# Patient Record
Sex: MALE | Employment: FULL TIME | URBAN - METROPOLITAN AREA
[De-identification: names, ages, dates, MRNs, and addresses within clinical notes are randomized per-mention and may not be internally consistent; named-entity substitution may affect disease eponyms.]

---

## 2024-06-09 ENCOUNTER — HOSPITAL ENCOUNTER (OUTPATIENT)
Dept: RADIOLOGY | Facility: HOSPITAL | Age: 44
Discharge: HOME | End: 2024-06-09
Payer: OTHER MISCELLANEOUS

## 2024-06-09 DIAGNOSIS — S56.511A PARTIAL TEAR OF COMMON EXTENSOR TENDON OF RIGHT ELBOW: ICD-10-CM

## 2024-06-09 PROCEDURE — 73221 MRI JOINT UPR EXTREM W/O DYE: CPT

## 2024-06-11 ENCOUNTER — OFFICE VISIT (OUTPATIENT)
Dept: PHYSICAL THERAPY | Facility: CLINIC | Age: 44
End: 2024-06-11
Payer: OTHER MISCELLANEOUS

## 2024-06-11 DIAGNOSIS — M77.11 RIGHT LATERAL EPICONDYLITIS: ICD-10-CM

## 2024-06-11 DIAGNOSIS — M25.521 RIGHT ELBOW PAIN: Primary | ICD-10-CM

## 2024-06-11 PROCEDURE — 97112 NEUROMUSCULAR REEDUCATION: CPT | Performed by: PHYSICAL THERAPIST

## 2024-06-11 PROCEDURE — 97110 THERAPEUTIC EXERCISES: CPT | Performed by: PHYSICAL THERAPIST

## 2024-06-11 PROCEDURE — 97140 MANUAL THERAPY 1/> REGIONS: CPT | Performed by: PHYSICAL THERAPIST

## 2024-06-11 NOTE — PROGRESS NOTES
Daily Note     Today's date: 2024  Patient name: Demi Reid  : 1980  MRN: [id number]  Referring provider: Kamron Pedro, *  Dx:   Encounter Diagnosis     ICD-10-CM    1. Right elbow pain  M25.521       2. Right lateral epicondylitis  M77.11                      Subjective: patient reports the elbow is starting to feel better. He mentions he had his MRI but does not have the results yet.      Objective: See treatment diary below      Assessment: Tolerated treatment well. Continued with program as outlined below. Patient demonstrated fatigue post treatment, exhibited good technique with therapeutic exercises, and would benefit from continued PT      Plan: Continue per plan of care.  Progress treatment as tolerated.       Diagnosis:    Precautions:    POC Expires Auth Status Start Date Expiration Date PT Visit Limit     WC - given 18 visits NA NA 18   Date 5/29 6/3 6/5 6/11    Used 1 2 3 4    Remaining 17 16 15 14    Manuals        PROM        Mobs        IASTM  R Elbow  R Elbow    EPAT   R Elbow NV            There Ex        UBE   1.5/1.5 1.5/1.5 - ROM 1.5/1.5    Wrist Flexion Stretch                                Patient Education    RT     Neruo Re-Ed        Rows  Black 2x10 20# 2x10 20# 3x10    Shld Ext  Black 2x10 15# 2x10  15# 3x10    ER / SL ER     3# 2x12  3# 3x10    IR        Wall Climbs   Yellow 1x10 Yellow 1x10    Scap Stab w/ Med Ball    Red MB x10 up/down, lateral, circles  Red MB x15 up/down, lateral, circles    Wrist Extension Eccentrics  3# 2x10       Therabar  Red 2x10 Supination / Pronation      Hammer  1.5# 2x10 Supination / Pronation      Scap Stab w/ Tband    Yellow 1x10 Yellow 1x10                                                   Ther Act                                         Modalities             CP PRN

## 2024-06-12 ENCOUNTER — OFFICE VISIT (OUTPATIENT)
Dept: PHYSICAL THERAPY | Facility: CLINIC | Age: 44
End: 2024-06-12
Payer: OTHER MISCELLANEOUS

## 2024-06-12 DIAGNOSIS — M25.521 RIGHT ELBOW PAIN: Primary | ICD-10-CM

## 2024-06-12 DIAGNOSIS — M77.11 RIGHT LATERAL EPICONDYLITIS: ICD-10-CM

## 2024-06-12 PROCEDURE — 97140 MANUAL THERAPY 1/> REGIONS: CPT | Performed by: PHYSICAL THERAPIST

## 2024-06-12 PROCEDURE — 97110 THERAPEUTIC EXERCISES: CPT | Performed by: PHYSICAL THERAPIST

## 2024-06-12 PROCEDURE — 97112 NEUROMUSCULAR REEDUCATION: CPT | Performed by: PHYSICAL THERAPIST

## 2024-06-12 NOTE — PROGRESS NOTES
Daily Note     Today's date: 2024  Patient name: Demi Reid  : 1980  MRN: [id number]  Referring provider: Kamron Pedro, *  Dx:   Encounter Diagnosis     ICD-10-CM    1. Right elbow pain  M25.521       2. Right lateral epicondylitis  M77.11           Start Time: 1530  Stop Time: 1610  Total time in clinic (min): 40 minutes    Subjective: Patient reports that elbow sx are improving. He is fairly asymptomatic in the morning and then soreness will slowly increase as the day goes on. However, it is significantly less compared to initial pain. Most aggravating factor is gripping.       Objective: See treatment diary below      Assessment: Patient demonstrates improved tolerance to scapular PREs with less recovery periods required to complete repetitions. Elbow strengthening resumed, which he was able to complete but noticed quicker onset of fatigue and min soreness by end of second set. EPAT performed to extensor bundle/insertion. Progress, as able.       Plan: Continue per plan of care.      Diagnosis:    Precautions:    POC Expires Auth Status Start Date Expiration Date PT Visit Limit     WC - given 18 visits NA NA 18   Date 5/29 6/3 6   Used 1 2 3 4 5   Remaining 17 16 15 14 15   Manuals        PROM        Mobs        IASTM  R Elbow  R Elbow Extensor bundle   EPAT   R Elbow NV Extensor bundle D20-S 1.3 bar 15-17 hz ~2000pulses           There Ex        UBE   1.5/1.5 1.5/1.5 - ROM 1.5/1.5 1.5/1.5   Wrist Flexion Stretch                                Patient Education    RT     Neruo Re-Ed        Rows  Black 2x10 20# 2x10 20# 3x10 24# 3x10   Shld Ext  Black 2x10 15# 2x10  15# 3x10 18# 3x10   ER / SL ER     3# 2x12  3# 3x10 NV   IR        Wall Climbs   Yellow 1x10 Yellow 1x10 Yellow 1x10 (inc TB NV)    Scap Stab w/ Med Ball    Red MB x10 up/down, lateral, circles  Red MB x15 up/down, lateral, circles Red 15x ea    Wrist Extension Eccentrics  3# 2x10       Therabar  Red 2x10  Supination / Pronation      Hammer  1.5# 2x10 Supination / Pronation   +2# 2x10 sup/pro   Scap Stab w/ Tband    Yellow 1x10 Yellow 1x10 Yellow 1x10 (inc TB NV)                                                  Ther Act                                         Modalities             CP PRN

## 2024-06-13 ENCOUNTER — APPOINTMENT (OUTPATIENT)
Dept: PHYSICAL THERAPY | Facility: CLINIC | Age: 44
End: 2024-06-13
Payer: OTHER MISCELLANEOUS

## 2024-06-19 ENCOUNTER — OFFICE VISIT (OUTPATIENT)
Dept: PHYSICAL THERAPY | Facility: CLINIC | Age: 44
End: 2024-06-19
Payer: OTHER MISCELLANEOUS

## 2024-06-19 DIAGNOSIS — M25.521 RIGHT ELBOW PAIN: Primary | ICD-10-CM

## 2024-06-19 DIAGNOSIS — M77.11 RIGHT LATERAL EPICONDYLITIS: ICD-10-CM

## 2024-06-19 PROCEDURE — 97112 NEUROMUSCULAR REEDUCATION: CPT

## 2024-06-19 PROCEDURE — 97140 MANUAL THERAPY 1/> REGIONS: CPT

## 2024-06-19 NOTE — PROGRESS NOTES
Daily Note     Today's date: 2024  Patient name: Valentin Reid  : 1980  MRN: [id number]  Referring provider: Kamron Pedro, *  Dx:   Encounter Diagnosis     ICD-10-CM    1. Right elbow pain  M25.521       2. Right lateral epicondylitis  M77.11              Subjective: Patient had MRI, stated there is a slight tear, but MD did not feel surgery was necessary. Feeling ok today.       Objective: See treatment diary below      Assessment: Patient demo noted tightness in wrist extensors. Was able to increased resistance and weight with strengthening as noted. Additional TE prescribed today to simulate work and lifting activities. Patient able to complete but does report mild irritation in the elbow. Most discomfort with gripping.      Plan: Continue per plan of care.      Diagnosis:    Precautions:    POC Expires Auth Status Start Date Expiration Date PT Visit Limit     WC - given 18 visits NA NA 18   Date 6/19 6/3 6/5 6/11 6/12   Used 6 2 3 4 5   Remaining 12 16 15 14 13   Manuals        PROM        Mobs        IASTM Extensor bundle R Elbow  R Elbow Extensor bundle   EPAT [facility]  R Elbow NV Extensor bundle D20-S 1.3 bar 15-17 hz ~2000pulses           There Ex        UBE  1.5/1.5 1.5/1.5 1.5/1.5 - ROM 1.5/1.5 1.5/1.5   Wrist Flexion Stretch                                Patient Education    RT     Neruo Re-Ed        Rows  Black 2x10 20# 2x10 20# 3x10 24# 3x10   Shld Ext  Black 2x10 15# 2x10  15# 3x10 18# 3x10   ER / SL ER   4#, 3x10  3# 2x12  3# 3x10 NV   IR        Wall Climbs   Yellow 1x10 Yellow 1x10 Yellow 1x10 (inc TB NV)    Scap Stab w/ Med Ball  yellow MB x15 up/down, lateral, circles  Red MB x10 up/down, lateral, circles  Red MB x15 up/down, lateral, circles Red 15x ea    Wrist Extension Eccentrics  3# 2x10       Therabar  Red 2x10 Supination / Pronation      Hammer +3# 2x10 sup/pro 1.5# 2x10 Supination / Pronation   +2# 2x10 sup/pro   Scap Stab w/ Tband  Red  10x  Yellow 1x10 Yellow  1x10 Yellow 1x10 (inc TB NV)   Axe chop [place] 15#, 2x10       U/l row 20#, 2x10       Low row 20# 2x10       Shoulder flexion into bicep curls Pronation/supination red MB 10x                     Ther Act                                      Modalities            CP PRN

## 2024-06-20 ENCOUNTER — OFFICE VISIT (OUTPATIENT)
Dept: PHYSICAL THERAPY | Facility: CLINIC | Age: 44
End: 2024-06-20
Payer: OTHER MISCELLANEOUS

## 2024-06-20 DIAGNOSIS — M25.521 RIGHT ELBOW PAIN: Primary | ICD-10-CM

## 2024-06-20 DIAGNOSIS — M77.11 RIGHT LATERAL EPICONDYLITIS: ICD-10-CM

## 2024-06-20 PROCEDURE — 97110 THERAPEUTIC EXERCISES: CPT | Performed by: PHYSICAL THERAPIST

## 2024-06-20 PROCEDURE — 97112 NEUROMUSCULAR REEDUCATION: CPT | Performed by: PHYSICAL THERAPIST

## 2024-06-20 NOTE — PROGRESS NOTES
"Daily Note     Today's date: 2024  Patient name: Valentin Reid  : 1980  MRN: [id number]  Referring provider: Kamron Pedro, *  Dx:   Encounter Diagnosis     ICD-10-CM    1. Right elbow pain  M25.521       2. Right lateral epicondylitis  M77.11                      Subjective: Patient feels that he has made some improvements, but also feels that it has gotten worse.        Objective: See treatment diary below      Assessment: Tolerated treatment well. Patient would benefit from continued PT.  Patient did well with PT today.  Patient notes that his symptoms have been up and down.  Worked on more eccentric work today for the wrist extensors.  Did note fatigue s/p session.  Continue to progress as able.        Plan: Progress treatment as tolerated.       Diagnosis:    Precautions:    POC Expires Auth Status Start Date Expiration Date PT Visit Limit     WC - given 18 visits NA NA 18   Date    Used 6 7  4 5   Remaining 12 11  14 13   Manuals        PROM        Mobs        IASTM Extensor bundle   R Elbow Extensor bundle   EPAT [facility]   NV Extensor bundle D20-S 1.3 bar 15-17 hz ~2000pulses           There Ex        UBE  1.5/1.5 1.5/1.5 - ROM  1.5/1.5 1.5/1.5   Wrist Flexion Stretch        Grippers  Black Gripper 3\" 2x10                       Patient Education         Neruo Re-Ed        Rows    20# 3x10 24# 3x10   Shld Ext    15# 3x10 18# 3x10   ER / SL ER   4#, 3x10   3# 3x10 NV   IR        Wall Climbs  Yellow TB 2x10  Yellow 1x10 Yellow 1x10 (inc TB NV)    Scap Stab w/ Med Ball  yellow MB x15 up/down, lateral, circles Scap Stab w/ Tband Yellow 2x5    Wall Clocks x3  Red MB x15 up/down, lateral, circles Red 15x ea    Wrist Extension Eccentrics  5# 2x10       Therabar  Green Ns & Us 2x10 ea     Green twists 2x10      Hammer +3# 2x10 sup/pro    +2# 2x10 sup/pro   Scap Stab w/ Tband  Red  10x   Yellow 1x10 Yellow 1x10 (inc TB NV)   Axe chop [place] 15#, 2x10       U/l row 20#, 2x10 "       Low row 20# 2x10       Shoulder flexion into bicep curls Pronation/supination red MB 10x                   Ther Act                                Modalities          CP PRN

## 2024-06-24 ENCOUNTER — OFFICE VISIT (OUTPATIENT)
Dept: PHYSICAL THERAPY | Facility: CLINIC | Age: 44
End: 2024-06-24
Payer: OTHER MISCELLANEOUS

## 2024-06-24 DIAGNOSIS — M77.11 RIGHT LATERAL EPICONDYLITIS: ICD-10-CM

## 2024-06-24 DIAGNOSIS — M25.521 RIGHT ELBOW PAIN: Primary | ICD-10-CM

## 2024-06-24 PROCEDURE — 97110 THERAPEUTIC EXERCISES: CPT | Performed by: PHYSICAL THERAPIST

## 2024-06-24 PROCEDURE — 97112 NEUROMUSCULAR REEDUCATION: CPT | Performed by: PHYSICAL THERAPIST

## 2024-06-24 NOTE — PROGRESS NOTES
"Daily Note     Today's date: 2024  Patient name: Valentin Reid  : 1980  MRN: [id number]  Referring provider: Kamron Pedro, *  Dx:   Encounter Diagnosis     ICD-10-CM    1. Right elbow pain  M25.521       2. Right lateral epicondylitis  M77.11                      Subjective: Patient notes that his elbow felt pretty good yesterday.        Objective: See treatment diary below      Assessment: Tolerated treatment well. Patient would benefit from continued PT.  Patient did well with PT today.  Continuing to work on overall strength.  Patient did note that he was able to lift and hold some pots when cooking yesterday and it did not bother him.  Did note some discomfort with eccentrics today.  Continue to progress strength as able.        Plan: Progress treatment as tolerated.       Diagnosis:    Precautions:    POC Expires Auth Status Start Date Expiration Date PT Visit Limit     WC - given 18 visits NA NA 18   Date    Used 6 7 8  5   Remaining 12 11 10  13   Manuals        PROM        Mobs        IASTM Extensor bundle    Extensor bundle   EPAT [facility]    Extensor bundle D20-S 1.3 bar 15-17 hz ~2000pulses           There Ex        UBE  1.5/1.5 1.5/1.5 - ROM 1.5/1.5 - ROM  1.5/1.5   Wrist Flexion Stretch        Grippers  Black Gripper 3\" 2x10                       Patient Education    RT     Neruo Re-Ed        Rows     24# 3x10   Shld Ext     18# 3x10   ER / SL ER   4#, 3x10  Standing 3# 2x10   NV   IR        Wall Climbs  Yellow TB 2x10 Red 2x10  Yellow 1x10 (inc TB NV)    Scap Stab w/ Med Ball  yellow MB x15 up/down, lateral, circles Scap Stab w/ Tband Yellow 2x5    Wall Clocks x3 Yellow MB x10 all directions   Red 15x ea    Wrist Extension Eccentrics  5# 2x10  5# 2x10      Therabar  Green Ns & Us 2x10 ea     Green twists 2x10 Green Ns & Us 2x10 ea     Green Twists 2x10      Hammer +3# 2x10 sup/pro    +2# 2x10 sup/pro   Scap Stab w/ Tband  Red  10x    Yellow 1x10 (inc TB NV) "   Axe stacey [place] 15#, 2x10       U/l row 20#, 2x10  20# 2x12      Low row 20# 2x10  Shld Ext 2x12     Shoulder flexion into bicep curls Pronation/supination red MB 10x                  Ther Act                             Modalities         CP PRN

## 2024-06-26 ENCOUNTER — OFFICE VISIT (OUTPATIENT)
Dept: PHYSICAL THERAPY | Facility: CLINIC | Age: 44
End: 2024-06-26
Payer: OTHER MISCELLANEOUS

## 2024-06-26 DIAGNOSIS — M25.521 RIGHT ELBOW PAIN: Primary | ICD-10-CM

## 2024-06-26 DIAGNOSIS — M77.11 RIGHT LATERAL EPICONDYLITIS: ICD-10-CM

## 2024-06-26 PROCEDURE — 97112 NEUROMUSCULAR REEDUCATION: CPT | Performed by: PHYSICAL THERAPIST

## 2024-06-26 PROCEDURE — 97110 THERAPEUTIC EXERCISES: CPT | Performed by: PHYSICAL THERAPIST

## 2024-06-26 NOTE — PROGRESS NOTES
PT Re-Evaluation     Today's date: 2024  Patient name: Valentin Reid  : 1980  MRN: [id number]  Referring provider: Kamron Pedro, *  Dx:   Encounter Diagnosis     ICD-10-CM    1. Right elbow pain  M25.521       2. Right lateral epicondylitis  M77.11                        Assessment  Impairments: abnormal muscle firing, abnormal or restricted ROM, activity intolerance, impaired physical strength, lacks appropriate home exercise program, pain with function, poor posture  and poor body mechanics    Assessment details: Demi Reid is a 43 y.o. male who has been consistent with attending and participating in skilled PT sessions.  He has been able to note some improvement with PT to date.  He has been able to demonstrate improved  strength and slightly improved UE strength.  He still notes having discomfort with lifting items as well as doing any motions where his arm is away from his surgery.  He will continue to benefit from further PT sessions to decrease his pain, increase his strength and increase his overall function.      Understanding of Dx/Px/POC: good     Prognosis: good    Goals  Impairment Goals  - Decrease pain by 50% in 6 weeks - PARTIALLY MET   - Increase right flexibility by 50% in 6 weeks - PARTIALLY MET  - Increase right upper extremity strength golbally to 4+/5 in 6 weeks - PARTIALLY MET   - Increase right  strength >= left  strength in 6 weeks - MET    Functional Goals  - Return to Prior Level of Function in 6 weeks - PARTIALLY MET   - Patient will be independent with HEP in 6 weeks - PARTIALLY MET   - Patient will be able to  with decreased pain in 6 weeks - PARTIALLY MET   - Patient will be able to carry a box with decreased pain in 6 weeks - PARTIALLY MET       Plan  Patient would benefit from: skilled physical therapy  Planned modality interventions: cryotherapy, thermotherapy: hydrocollator packs and TENS    Planned therapy interventions: home  "exercise program, graded exercise, functional ROM exercises, flexibility, body mechanics training, postural training, patient education, therapeutic activities, therapeutic exercise, manual therapy, joint mobilization and neuromuscular re-education    Frequency: 2x week  Duration in weeks: 4  Treatment plan discussed with: patient        Subjective Evaluation    History of Present Illness  Mechanism of injury: Patient notes that he can still get some sharp pain when he is gripping and lifting objects.  He has noted that it depends on the angle that he is grabbing something that he can feel it when he is lifting weight.  He notes that he is still limited with lifting anything up.  He notes that his ROM has improved.  Patient feels that he has improved \"70%\" since starting PT.      HPI:  Patient notes that he originally hurt his elbow at work when he went to  a chain when he felt pain in his elbow.  He noted having weakness in his right arm and then noted having some numbness when he was resting his arm.  He noted having pain with his ROM.  He reported his injury and then he went to see a physician where he was then referred to PT and was recommended MRI.  He now presents for his PT evaluation.      Pain Location:  R Elbow   Occupation:    Prior Functional Limitations:  Independent prior   AGG:  Grabbing, gripping, extending arm   Ease:  Rest / not move arm   Patient Goals:  \"I want to be able to use my full  and ROM\"      Pain  Current pain ratin  At best pain ratin  At worst pain ratin  Quality: sharp and tight          Objective     Tenderness     Right Elbow   Tenderness in the lateral epicondyle.     Right Wrist/Hand   Tenderness in the lateral epicondyle.     Active Range of Motion     Right Elbow   Flexion: WFL and with pain  Extension: WFL and with pain  Forearm supination: WFL and with pain  Forearm pronation: WFL and with pain    Right Wrist   Wrist extension: with " "pain    Additional Active Range of Motion Details  Patient with full ROM, but notes pain with full extension, flexion, and supination/pronation  Right wrist extension - 50% w/ pain  Right wrist flexion - WNL    Strength/Myotome Testing     Left Elbow   Normal strength    Right Elbow   Flexion: 4  Extension: 4  Forearm supination: 4-  Forearm pronation: 4    Left Wrist/Hand   Normal wrist strength     (2nd hand position)     Trial 1: 85    Trial 2: 70    Trial 3: 95    Average: 83.33    Right Wrist/Hand   Wrist extension: 3+  Wrist flexion: 4  Radial deviation: 4  Ulnar deviation: 4     (2nd hand position)     Trial 1: 100    Trial 2: 105    Trial 3: 120    Average: 108.33    Tests     Right Elbow   Positive [employer].                Diagnosis:    Precautions:    POC Expires Auth Status Start Date Expiration Date PT Visit Limit    6/27 WC - given 18 visits NA NA 18   Date 6/19 6/20 6/24 6/26    Used 6 7 8 9    Remaining 12 11 10 9    Manuals        PROM        Mobs        IASTM Extensor bundle       EPAT [facility]               There Ex        UBE  1.5/1.5 1.5/1.5 - ROM 1.5/1.5 - ROM 1.5/1.5 - ROM    Wrist Flexion Stretch        Grippers  Black Gripper 3\" 2x10                       Patient Education    RT RT - RE     Neruo Re-Ed        Rows    Bent Over 15# 2x10    Shld Ext    Bent Over 15# 2x10    Horiz ABD    Bent Over 5# 2x10    Bent Over Lower Trap    5# 2x10    ER / SL ER   4#, 3x10  Standing 3# 2x10      IR        Wall Climbs  Yellow TB 2x10 Red 2x10     Scap Stab w/ Med Ball  yellow MB x15 up/down, lateral, circles Scap Stab w/ Tband Yellow 2x5    Wall Clocks x3 Yellow MB x10 all directions      Wrist Extension Eccentrics  5# 2x10  5# 2x10      Therabar  Green Ns & Us 2x10 ea     Green twists 2x10 Green Ns & Us 2x10 ea     Green Twists 2x10      Hammer +3# 2x10 sup/pro       Scap Stab w/ Tband  Red  10x       Axe chop [place] 15#, 2x10       U/l row 20#, 2x10  20# 2x12      Low row 20# 2x10  Shld " Ext 2x12     Shoulder flexion into bicep curls Pronation/supination red MB 10x                 Ther Act                          Modalities        CP PRN

## 2024-06-26 NOTE — LETTER
2024    Kamron Pedro MD  [address] [place] [id number]    Patient: Valentin Reid   YOB: 1980   Date of Visit: 2024     Encounter Diagnosis     ICD-10-CM    1. Right elbow pain  M25.521       2. Right lateral epicondylitis  M77.11           Dear Dr. Pedro:    Thank you for your recent referral of Valentin Reid. Please review the attached evaluation summary from Valentin's recent visit.     Please verify that you agree with the plan of care by signing the attached order.     If you have any questions or concerns, please do not hesitate to call.     I sincerely appreciate the opportunity to share in the care of one of your patients and hope to have another opportunity to work with you in the near future.       Sincerely,    Keith Rodriguez, PT      Referring Provider:      I certify that I have read the below Plan of Care and certify the need for these services furnished under this plan of treatment while under my care.                    Kamron Pedro MD  [address] [place] [id number]  Via Fax: [phone]          PT Re-Evaluation     Today's date: 2024  Patient name: Valentin Reid  : 1980  MRN: [id number]  Referring provider: Kamron Pedro, *  Dx:   Encounter Diagnosis     ICD-10-CM    1. Right elbow pain  M25.521       2. Right lateral epicondylitis  M77.11                        Assessment  Impairments: abnormal muscle firing, abnormal or restricted ROM, activity intolerance, impaired physical strength, lacks appropriate home exercise program, pain with function, poor posture  and poor body mechanics    Assessment details: Demi Reid is a 43 y.o. male who has been consistent with attending and participating in skilled PT sessions.  He has been able to note some improvement with PT to date.  He has been able to demonstrate improved  strength and slightly improved UE  "strength.  He still notes having discomfort with lifting items as well as doing any motions where his arm is away from his surgery.  He will continue to benefit from further PT sessions to decrease his pain, increase his strength and increase his overall function.      Understanding of Dx/Px/POC: good     Prognosis: good    Goals  Impairment Goals  - Decrease pain by 50% in 6 weeks - PARTIALLY MET   - Increase right flexibility by 50% in 6 weeks - PARTIALLY MET  - Increase right upper extremity strength golbally to 4+/5 in 6 weeks - PARTIALLY MET   - Increase right  strength >= left  strength in 6 weeks - MET    Functional Goals  - Return to Prior Level of Function in 6 weeks - PARTIALLY MET   - Patient will be independent with HEP in 6 weeks - PARTIALLY MET   - Patient will be able to  with decreased pain in 6 weeks - PARTIALLY MET   - Patient will be able to carry a box with decreased pain in 6 weeks - PARTIALLY MET       Plan  Patient would benefit from: skilled physical therapy  Planned modality interventions: cryotherapy, thermotherapy: hydrocollator packs and TENS    Planned therapy interventions: home exercise program, graded exercise, functional ROM exercises, flexibility, body mechanics training, postural training, patient education, therapeutic activities, therapeutic exercise, manual therapy, joint mobilization and neuromuscular re-education    Frequency: 2x week  Duration in weeks: 4  Treatment plan discussed with: patient        Subjective Evaluation    History of Present Illness  Mechanism of injury: Patient notes that he can still get some sharp pain when he is gripping and lifting objects.  He has noted that it depends on the angle that he is grabbing something that he can feel it when he is lifting weight.  He notes that he is still limited with lifting anything up.  He notes that his ROM has improved.  Patient feels that he has improved \"70%\" since starting PT.      HPI:  Patient notes " "that he originally hurt his elbow at work when he went to  a chain when he felt pain in his elbow.  He noted having weakness in his right arm and then noted having some numbness when he was resting his arm.  He noted having pain with his ROM.  He reported his injury and then he went to see a physician where he was then referred to PT and was recommended MRI.  He now presents for his PT evaluation.      Pain Location:  R Elbow   Occupation:    Prior Functional Limitations:  Independent prior   AGG:  Grabbing, gripping, extending arm   Ease:  Rest / not move arm   Patient Goals:  \"I want to be able to use my full  and ROM\"      Pain  Current pain ratin  At best pain ratin  At worst pain ratin  Quality: sharp and tight          Objective     Tenderness     Right Elbow   Tenderness in the lateral epicondyle.     Right Wrist/Hand   Tenderness in the lateral epicondyle.     Active Range of Motion     Right Elbow   Flexion: WFL and with pain  Extension: WFL and with pain  Forearm supination: WFL and with pain  Forearm pronation: WFL and with pain    Right Wrist   Wrist extension: with pain    Additional Active Range of Motion Details  Patient with full ROM, but notes pain with full extension, flexion, and supination/pronation  Right wrist extension - 50% w/ pain  Right wrist flexion - WNL    Strength/Myotome Testing     Left Elbow   Normal strength    Right Elbow   Flexion: 4  Extension: 4  Forearm supination: 4-  Forearm pronation: 4    Left Wrist/Hand   Normal wrist strength     (2nd hand position)     Trial 1: 85    Trial 2: 70    Trial 3: 95    Average: 83.33    Right Wrist/Hand   Wrist extension: 3+  Wrist flexion: 4  Radial deviation: 4  Ulnar deviation: 4     (2nd hand position)     Trial 1: 100    Trial 2: 105    Trial 3: 120    Average: 108.33    Tests     Right Elbow   Positive [employer].                Diagnosis:    Precautions:    POC Expires Auth Status Start " "Date Expiration Date PT Visit Limit    6/27 WC - given 18 visits NA NA 18   Date 6/19 6/20 6/24 6/26    Used 6 7 8 9    Remaining 12 11 10 9    Manuals        PROM        Mobs        IASTM Extensor bundle       EPAT [facility]               There Ex        UBE  1.5/1.5 1.5/1.5 - ROM 1.5/1.5 - ROM 1.5/1.5 - ROM    Wrist Flexion Stretch        Grippers  Black Gripper 3\" 2x10                       Patient Education    RT RT - RE     Neruo Re-Ed        Rows    Bent Over 15# 2x10    Shld Ext    Bent Over 15# 2x10    Horiz ABD    Bent Over 5# 2x10    Bent Over Lower Trap    5# 2x10    ER / SL ER   4#, 3x10  Standing 3# 2x10      IR        Wall Climbs  Yellow TB 2x10 Red 2x10     Scap Stab w/ Med Ball  yellow MB x15 up/down, lateral, circles Scap Stab w/ Tband Yellow 2x5    Wall Clocks x3 Yellow MB x10 all directions      Wrist Extension Eccentrics  5# 2x10  5# 2x10      Therabar  Green Ns & Us 2x10 ea     Green twists 2x10 Green Ns & Us 2x10 ea     Green Twists 2x10      Hammer +3# 2x10 sup/pro       Scap Stab w/ Tband  Red  10x       Axe chop [place] 15#, 2x10       U/l row 20#, 2x10  20# 2x12      Low row 20# 2x10  Shld Ext 2x12     Shoulder flexion into bicep curls Pronation/supination red MB 10x                 Ther Act                          Modalities        CP PRN                                                  "

## 2024-07-02 ENCOUNTER — OFFICE VISIT (OUTPATIENT)
Dept: PHYSICAL THERAPY | Facility: CLINIC | Age: 44
End: 2024-07-02
Payer: OTHER MISCELLANEOUS

## 2024-07-02 DIAGNOSIS — M25.521 RIGHT ELBOW PAIN: Primary | ICD-10-CM

## 2024-07-02 DIAGNOSIS — M77.11 RIGHT LATERAL EPICONDYLITIS: ICD-10-CM

## 2024-07-02 PROCEDURE — 97112 NEUROMUSCULAR REEDUCATION: CPT | Performed by: PHYSICAL THERAPIST

## 2024-07-02 PROCEDURE — 97110 THERAPEUTIC EXERCISES: CPT | Performed by: PHYSICAL THERAPIST

## 2024-07-02 NOTE — PROGRESS NOTES
"Daily Note     Today's date: 2024  Patient name: Valentin Reid  : 1980  MRN: [id number]  Referring provider: Kamron Pedro, *  Dx:   Encounter Diagnosis     ICD-10-CM    1. Right elbow pain  M25.521       2. Right lateral epicondylitis  M77.11                      Subjective: Patient notes that his elbow is slowly getting better, but notes that he can still have pain in his elbow when gripping and lifting up an object.        Objective: See treatment diary below      Assessment: Tolerated treatment well. Patient would benefit from continued PT.  Continuing to work on overall mobility and strength.  Did note some sensations in his elbow with a push-up plus on the wall today.  Continue to target strengthening of the elbow and UE with future visits.        Plan: Progress treatment as tolerated.       Diagnosis:    Precautions:    POC Expires Auth Status Start Date Expiration Date PT Visit Limit     WC - given 18 visits NA NA 18   Date  7/   Used 6 7 8 9 10   Remaining 12 11 10 9 8   Manuals        PROM        Mobs        IASTM Extensor bundle       EPAT [facility]               There Ex        UBE  1.5/1.5 1.5/1.5 - ROM 1.5/1.5 - ROM 1.5/1.5 - ROM 1.5/1.5 - ROM    Wrist Flexion Stretch        Grippers  Black Gripper 3\" 2x10       Doorway Stretch     10\" x 10   Reid Slides     x10   Push-Up Plus     X10 @ wall           Patient Education    RT RT - RE     Neruo Re-Ed        Rows    Bent Over 15# 2x10    Shld Ext    Bent Over 15# 2x10    Horiz ABD    Bent Over 5# 2x10    Bent Over Lower Trap    5# 2x10    ER / SL ER   4#, 3x10  Standing 3# 2x10   Standing Kettlebell 5# ER to press 2x10    IR        Wall Climbs  Yellow TB 2x10 Red 2x10  Red 2x10   Scap Stab w/ Med Ball  yellow MB x15 up/down, lateral, circles Scap Stab w/ Tband Yellow 2x5    Wall Clocks x3 Yellow MB x10 all directions      Wrist Extension Eccentrics  5# 2x10  5# 2x10      Therabar  Green Ns & Us 2x10 ea     Green " twists 2x10 Green Ns & Us 2x10 ea     Green Twists 2x10      Hammer +3# 2x10 sup/pro       Scap Stab w/ Tband  Red  10x       Axe chop Golden 15#, 2x10       U/l row 20#, 2x10  20# 2x12      Low row 20# 2x10  Shld Ext 2x12     Shoulder flexion into bicep curls Pronation/supination red MB 10x                 Ther Act                          Modalities        CP PRN

## 2024-07-03 ENCOUNTER — OFFICE VISIT (OUTPATIENT)
Dept: PHYSICAL THERAPY | Facility: CLINIC | Age: 44
End: 2024-07-03
Payer: OTHER MISCELLANEOUS

## 2024-07-03 DIAGNOSIS — M25.521 RIGHT ELBOW PAIN: Primary | ICD-10-CM

## 2024-07-03 DIAGNOSIS — M77.11 RIGHT LATERAL EPICONDYLITIS: ICD-10-CM

## 2024-07-03 PROCEDURE — 97110 THERAPEUTIC EXERCISES: CPT | Performed by: PHYSICAL THERAPIST

## 2024-07-03 PROCEDURE — 97112 NEUROMUSCULAR REEDUCATION: CPT | Performed by: PHYSICAL THERAPIST

## 2024-07-03 NOTE — PROGRESS NOTES
"Daily Note     Today's date: 7/3/2024  Patient name: Valentin Reid  : 1980  MRN: [id number]  Referring provider: Kamron Pedro, *  Dx:   Encounter Diagnosis     ICD-10-CM    1. Right elbow pain  M25.521       2. Right lateral epicondylitis  M77.11                      Subjective: Patient with no new complaints after his visit yesterday.        Objective: See treatment diary below      Assessment: Tolerated treatment well. Patient would benefit from continued PT.  Patient continuing to do well.  Did note some discomfort with reaching/holding exercises today as well as bicep curls.  Continuing to emphasize strength of the patient's UE.  Continue to progress as able.        Plan: Progress treatment as tolerated.       Diagnosis:    Precautions:    POC Expires Auth Status Start Date Expiration Date PT Visit Limit     WC - given 18 visits NA NA 18   Date 7/3  6/24 6/26 7/   Used 11  8 9 10   Remaining 7  10 9 8   Manuals        PROM        Mobs        IASTM        EPAT                There Ex        UBE    1.5/1.5 - ROM 1.5/1.5 - ROM 1.5/1.5 - ROM    Wrist Flexion Stretch x10       Grippers        Doorway Stretch 10\" x 10    10\" x 10   Reid Slides     x10   Push-Up Plus     X10 @ wall           Patient Education    RT RT - RE     Neruo Re-Ed        Rows    Bent Over 15# 2x10    Shld Ext    Bent Over 15# 2x10    Horiz ABD    Bent Over 5# 2x10    Bent Over Lower Trap    5# 2x10    ER / SL ER     Standing 3# 2x10   Standing Kettlebell 5# ER to press 2x10    IR        Wall Climbs   Red 2x10  Red 2x10   Scap Stab w/ Med Ball    Yellow MB x10 all directions      Wrist Extension Eccentrics   5# 2x10      Therabar Green Ns & Us 2x10 ea    Green Twists 2x10  Green Ns & Us 2x10 ea     Green Twists 2x10      Hammer 3# 2x10       Scap Stab w/ Tband  Steering Wheel w/ 3#s Red TB 2x10       Axe chop        U/l row   20# 2x12      Low row   Shld Ext 2x12     Shoulder flexion into bicep curls Bicep Curl 30# " 3x10    Triceps Ext 30# 3x10                 Ther Act                          Modalities        CP PRN

## 2024-07-08 ENCOUNTER — OFFICE VISIT (OUTPATIENT)
Dept: PHYSICAL THERAPY | Facility: CLINIC | Age: 44
End: 2024-07-08
Payer: OTHER MISCELLANEOUS

## 2024-07-08 DIAGNOSIS — M77.11 RIGHT LATERAL EPICONDYLITIS: ICD-10-CM

## 2024-07-08 DIAGNOSIS — M25.521 RIGHT ELBOW PAIN: Primary | ICD-10-CM

## 2024-07-08 PROCEDURE — 97140 MANUAL THERAPY 1/> REGIONS: CPT

## 2024-07-08 PROCEDURE — 97110 THERAPEUTIC EXERCISES: CPT

## 2024-07-08 PROCEDURE — 97112 NEUROMUSCULAR REEDUCATION: CPT

## 2024-07-08 NOTE — PROGRESS NOTES
"Daily Note     Today's date: 2024  Patient name: Valentin Reid  : 1980  MRN: [id number]  Referring provider: Kamron Pedro, *  Dx:   Encounter Diagnosis     ICD-10-CM    1. Right elbow pain  M25.521       2. Right lateral epicondylitis  M77.11                      Subjective: Pt states that his elbow is getting better overall, just continues with pain with certain motions.        Objective: See treatment diary below      Assessment: Tolerated treatment well.  EPAT to improve circulation and decrease pain.  Scapular strengthening performed with muscle fatigue noted.  No reports of increased pain.  Minimal discomfort with therabar twists, in tolerable range.  Pt progressing slowly towards his goals and will benefit from continued therapy.      Plan: Continue per plan of care.      Diagnosis:    Precautions:    POC Expires Auth Status Start Date Expiration Date PT Visit Limit     WC - given 18 visits NA NA 18   Date 7/3 7/8  6/26 7/2   Used 11 12  9 10   Remaining 7 6  9 8   Manuals        PROM        Mobs        IASTM        EPAT  D20S x2              There Ex        UBE   1.5/1.5   1.5/1.5 - ROM 1.5/1.5 - ROM    Wrist Flexion Stretch x10       Grippers        Doorway Stretch 10\" x 10    10\" x 10   Reid Slides     x10   Push-Up Plus     X10 @ wall           Patient Education     RT - RE     Neruo Re-Ed        Rows    Bent Over 15# 2x10    Shld Ext  21# 2x12  Bent Over 15# 2x10    Horiz ABD    Bent Over 5# 2x10    Bent Over Lower Trap    5# 2x10    ER / SL ER    No monies  GTB 2x10   Standing Kettlebell 5# ER to press 2x10    IR        Wall Climbs  Clocks GTB 3x   Red 2x10   Scap Stab w/ Med Ball         Wrist Extension Eccentrics        Therabar Green Ns & Us 2x10 ea    Green Twists 2x10 Green 2x10 bens U's, N's  Ea    Green twists  2x10      Hammer 3# 2x10       Scap Stab w/ Tband  Steering Wheel w/ 3#s Red TB 2x10 Green 5x2      Axe chop        U/l row        Low row  25# 2x12    "   Shoulder flexion into bicep curls Bicep Curl 30# 3x10    Triceps Ext 30# 3x10                 Ther Act                          Modalities        CP PRN

## 2024-07-11 ENCOUNTER — OFFICE VISIT (OUTPATIENT)
Dept: PHYSICAL THERAPY | Facility: CLINIC | Age: 44
End: 2024-07-11
Payer: OTHER MISCELLANEOUS

## 2024-07-11 DIAGNOSIS — M77.11 RIGHT LATERAL EPICONDYLITIS: ICD-10-CM

## 2024-07-11 DIAGNOSIS — M25.521 RIGHT ELBOW PAIN: Primary | ICD-10-CM

## 2024-07-11 PROCEDURE — 97110 THERAPEUTIC EXERCISES: CPT

## 2024-07-11 PROCEDURE — 97530 THERAPEUTIC ACTIVITIES: CPT

## 2024-07-11 PROCEDURE — 97112 NEUROMUSCULAR REEDUCATION: CPT

## 2024-07-11 NOTE — PROGRESS NOTES
"Daily Note     Today's date: 2024  Patient name: Valentin Reid  : 1980  MRN: [id number]  Referring provider: Kamron Pedro, *  Dx:   Encounter Diagnosis     ICD-10-CM    1. Right elbow pain  M25.521       2. Right lateral epicondylitis  M77.11                      Subjective: Pt states that cortisone shot helped       Objective: See treatment diary below      Assessment: Tolerated treatment well.  Increased reps and sets to this date to increase load on tendon. Needed some verbal cueing in order to improve scapular activation. Progressing slowly towards goal.       Plan: Continue per plan of care.      Diagnosis:    Precautions:    POC Expires Auth Status Start Date Expiration Date PT Visit Limit     WC - given 18 visits NA NA 18   Date 7/3 7/8 7/11 6/26 7/2   Used 11 12 13 9 10   Remaining 7 6 5 9 8   Manuals        PROM        Mobs        IASTM        EPAT  D20S x2              There Ex        UBE   1.5/1.5  1.5/1.5 1.5/1.5 - ROM 1.5/1.5 - ROM    Wrist Flexion Stretch x10       Radial nerve glides   15x      Doorway Stretch 10\" x 10  10\"x10  10\" x 10   Reid Slides     x10   Push-Up Plus     X10 @ wall           Patient Education     RT - RE     Neruo Re-Ed        Scap stab on the wall   RMB 1x      Rows    Bent Over 15# 2x10    Shld Ext  21# 2x12 21# 2x12 Bent Over 15# 2x10    Horiz ABD    Bent Over 5# 2x10    Bent Over Lower Trap    5# 2x10    ER / SL ER    No monies  GTB 2x10 No monies GTB   Standing Kettlebell 5# ER to press 2x10    IR        Wall Climbs  Clocks GTB 3x 5x GTB   Red 2x10   Scap Stab w/ Med Ball         Wrist Extension Eccentrics        Therabar Green Ns & Us 2x10 ea    Green Twists 2x10 Green 2x10 bens U's, N's  Ea    Green twists  2x10 Green 2x15 U's, N's     Rajesh Twists 2x15     Hammer 3# 2x10       Scap Stab w/ Tband  Steering Wheel w/ 3#s Red TB 2x10 Green 5x2 Green 5x     Axe chop        U/l row        Low row  25# 2x12 25# 2x12     Shoulder flexion into " bicep curls Bicep Curl 30# 3x10    Triceps Ext 30# 3x10                 Ther Act                          Modalities        CP PRN

## 2024-07-15 ENCOUNTER — OFFICE VISIT (OUTPATIENT)
Dept: PHYSICAL THERAPY | Facility: CLINIC | Age: 44
End: 2024-07-15
Payer: OTHER MISCELLANEOUS

## 2024-07-15 DIAGNOSIS — M77.11 RIGHT LATERAL EPICONDYLITIS: ICD-10-CM

## 2024-07-15 DIAGNOSIS — M25.521 RIGHT ELBOW PAIN: Primary | ICD-10-CM

## 2024-07-15 PROCEDURE — 97112 NEUROMUSCULAR REEDUCATION: CPT | Performed by: PHYSICAL THERAPIST

## 2024-07-15 PROCEDURE — 97110 THERAPEUTIC EXERCISES: CPT | Performed by: PHYSICAL THERAPIST

## 2024-07-15 NOTE — PROGRESS NOTES
"Daily Note     Today's date: 7/15/2024  Patient name: Valentin Reid  : 1980  MRN: [id number]  Referring provider: Kamron Pedro, *  Dx:   Encounter Diagnosis     ICD-10-CM    1. Right elbow pain  M25.521       2. Right lateral epicondylitis  M77.11                      Subjective: Patient notes that the shot has really helped.        Objective: See treatment diary below      Assessment: Tolerated treatment well. Patient would benefit from continued PT.  Patient doing well overall.  Notes feeling much better after his injection.  Able to tolerate all PREs today without any pain in his elbow.  Continue to progress strength as able.        Plan: Progress treatment as tolerated.       Diagnosis:    Precautions:    POC Expires Auth Status Start Date Expiration Date PT Visit Limit     WC - given 18 visits NA NA 18   Date 7/3 7/8 7/11 7/15    Used 11 12 13 14    Remaining 7 6 5 4    Manuals        PROM        Mobs        IASTM        EPAT  D20S x2              There Ex        UBE   1.5/1.5  1.5/1.5     Wrist Flexion Stretch x10       Radial nerve glides   15x      Doorway Stretch 10\" x 10  10\"x10 10\" x 10     Reid Slides        Push-Up Plus                Patient Education         Neruo Re-Ed        Scap stab on the wall   RMB 1x      Rows        Shld Ext  21# 2x12 21# 2x12     Horiz ABD        Bent Over Lower Trap        ER / SL ER    No monies  GTB 2x10 No monies GTB  No Money Black TB 2x10  Hitchers Black TB 2x10    IR        Wall Climbs  Clocks GTB 3x 5x GTB  Climbs Green TB 3x5    Scap Stab w/ Med Ball         Wrist Extension Eccentrics        Therabar Green Ns & Us 2x10 ea    Green Twists 2x10 Green 2x10 bens U's, N's  Ea    Green twists  2x10 Green 2x15 U's, N's     Rajesh Twists 2x15 Green 2x15 U's, N's    Rajesh Twists 2x15    Hammer 3# 2x10       Scap Stab w/ Tband  Steering Wheel w/ 3#s Red TB 2x10 Green 5x2 Green 5x     Axe chop    18# 2x10     U/l row        Low row  25# 2x12 25# 2x12 " 25# 3x10     Shoulder flexion into bicep curls Bicep Curl 30# 3x10    Triceps Ext 30# 3x10                 Ther Act                          Modalities        CP PRN

## 2024-07-18 ENCOUNTER — OFFICE VISIT (OUTPATIENT)
Dept: PHYSICAL THERAPY | Facility: CLINIC | Age: 44
End: 2024-07-18
Payer: OTHER MISCELLANEOUS

## 2024-07-18 DIAGNOSIS — M77.11 RIGHT LATERAL EPICONDYLITIS: ICD-10-CM

## 2024-07-18 DIAGNOSIS — M25.521 RIGHT ELBOW PAIN: Primary | ICD-10-CM

## 2024-07-18 PROCEDURE — 97112 NEUROMUSCULAR REEDUCATION: CPT

## 2024-07-18 PROCEDURE — 97110 THERAPEUTIC EXERCISES: CPT

## 2024-07-18 NOTE — PROGRESS NOTES
"Daily Note     Today's date: 2024  Patient name: Valentin Reid  : 1980  MRN: [id number]  Referring provider: Kamron Pedro, *  Dx:   Encounter Diagnosis     ICD-10-CM    1. Right elbow pain  M25.521       2. Right lateral epicondylitis  M77.11                      Subjective: Patient notes that the shot has really helped.        Objective: See treatment diary below      Assessment: Tolerated treatment well. Patient would benefit from continued PT.  Patient doing well overall. Continue to load strength. He noted fatigue with blue wiggles but able to complete at decrease green. Patient moderately challenged with outlined program with mild cueing for scapular stabilizers.       Plan: Progress treatment as tolerated.       Diagnosis:    Precautions:    POC Expires Auth Status Start Date Expiration Date PT Visit Limit     WC - given 18 visits NA NA 18   Date 7/3 7/8 7/11 7/15 7/18   Used 11 12 13 14 15   Remaining 7 6 5 4 3   Manuals        PROM        Mobs        IASTM        EPAT  D20S x2              There Ex        UBE   1.5/1.5  1.5/1.5  2/2   Wrist Flexion Stretch x10       Radial nerve glides   15x      Doorway Stretch 10\" x 10  10\"x10 10\" x 10     Reid Slides        Push-Up Plus                Patient Education         Neruo Re-Ed        Scap stab on the wall   RMB 1x      Rows        Shld Ext  21# 2x12 21# 2x12     Horiz ABD        Bent Over Lower Trap        ER / SL ER    No monies  GTB 2x10 No monies GTB  No Money Black TB 2x10  Hitchers Black TB 2x10    IR        Wall Climbs  Clocks GTB 3x 5x GTB  Climbs Green TB 3x5 Climbs green TB 2x10   KB twists      2x15 10#    Wrist Extension Eccentrics        Therabar Green Ns & Us 2x10 ea    Green Twists 2x10 Green 2x10 bens U's, N's  Ea    Green twists  2x10 Green 2x15 U's, N's     Rajesh Twists 2x15 Green 2x15 U's, N's    Rajesh Twists 2x15 Rajesh twists 3x15    Blue 2x15 U's, N's    Hammer 3# 2x10       Scap Stab w/ Tband  Steering Wheel " "w/ 3#s Red TB 2x10 Green 5x2 Green 5x     Axe chop    18# 2x10  18# 2x10   Therabar      30\"x3 blue fwd/ lat    Low row  25# 2x12 25# 2x12 25# 3x10  28# 3x15   Shoulder flexion into bicep curls Bicep Curl 30# 3x10    Triceps Ext 30# 3x10                 Ther Act                          Modalities        CP PRN                                          "

## 2024-07-22 ENCOUNTER — OFFICE VISIT (OUTPATIENT)
Dept: PHYSICAL THERAPY | Facility: CLINIC | Age: 44
End: 2024-07-22
Payer: OTHER MISCELLANEOUS

## 2024-07-22 DIAGNOSIS — M77.11 RIGHT LATERAL EPICONDYLITIS: ICD-10-CM

## 2024-07-22 DIAGNOSIS — M25.521 RIGHT ELBOW PAIN: Primary | ICD-10-CM

## 2024-07-22 PROCEDURE — 97110 THERAPEUTIC EXERCISES: CPT

## 2024-07-22 PROCEDURE — 97112 NEUROMUSCULAR REEDUCATION: CPT

## 2024-07-22 NOTE — PROGRESS NOTES
"Daily Note     Today's date: 2024  Patient name: Valentin Reid  : 1980  MRN: [id number]  Referring provider: Kamron Pedro, *  Dx:   Encounter Diagnosis     ICD-10-CM    1. Right elbow pain  M25.521       2. Right lateral epicondylitis  M77.11           Start Time: 1000  Stop Time: 1046  Total time in clinic (min): 46 minutes    Subjective: Patient states he is still feeling really good. He states he was actually able to do some cleaning around his house for the first time in months.       Objective: See treatment diary below      Assessment: Continued with outlined program. Patient responded well to strengthening exercises performed this visit. He does have noticeable fatigue, mostly in his R shoulder but he is able to perform exercises. He exhibits good technique and requires very minimal cues for proper technique. Will continue to progress as he is able to tolerate.       Plan: Continue per plan of care.      Diagnosis:    Precautions:    POC Expires Auth Status Start Date Expiration Date PT Visit Limit     WC - given 18 visits NA NA 18   Date 7/22 7/8 7/11 7/15 7/18   Used 16 12 13 14 15   Remaining 2 6 5 4 3   Manuals        PROM        Mobs        IASTM        EPAT  D20S x2              There Ex        UBE  2/2 1.5/1.5  1.5/1.5  2/2   Wrist Flexion Stretch        Radial nerve glides   15x      Doorway Stretch 10x10 sec   10\"x10 10\" x 10     Reid Slides        Push-Up Plus                Patient Education         Neruo Re-Ed        Scap stab on the wall   RMB 1x      Rows        Shld Ext 21# 3x15  21# 2x12 21# 2x12     Horiz ABD        Bent Over Lower Trap        ER / SL ER   No money BlkTB 2x10  Hitchers blkTB 2x10 No monies  GTB 2x10 No monies GTB  No Money Black TB 2x10  Hitchers Black TB 2x10    IR        Wall Climbs Climbs GTB 2x10  Clocks GTB 3x 5x GTB  Climbs Green TB 3x5 Climbs green TB 2x10   KB twists      2x15 10#    Wrist Extension Eccentrics        Therabar Rajesh " "twists 3x15     Blue 2x15 U, Ns Green 2x10 bens U's, N's  Ea    Green twists  2x10 Green 2x15 U's, N's     Rajesh Twists 2x15 Green 2x15 U's, N's    Rajesh Twists 2x15 Rajesh twists 3x15    Blue 2x15 U's, N's    Hammer        Scap Stab w/ Tband  Green 2x5  Green 5x2 Green 5x     Axe chop    18# 2x10  18# 2x10   Therabar      30\"x3 blue fwd/ lat    Low row 28# 3x15  25# 2x12 25# 2x12 25# 3x10  28# 3x15   Shoulder flexion into bicep curls                  Ther Act                          Modalities        CP PRN                                            "

## 2024-07-25 ENCOUNTER — OFFICE VISIT (OUTPATIENT)
Dept: PHYSICAL THERAPY | Facility: CLINIC | Age: 44
End: 2024-07-25
Payer: OTHER MISCELLANEOUS

## 2024-07-25 DIAGNOSIS — M77.11 RIGHT LATERAL EPICONDYLITIS: ICD-10-CM

## 2024-07-25 DIAGNOSIS — M25.521 RIGHT ELBOW PAIN: Primary | ICD-10-CM

## 2024-07-25 PROCEDURE — 97110 THERAPEUTIC EXERCISES: CPT | Performed by: PHYSICAL MEDICINE & REHABILITATION

## 2024-07-25 PROCEDURE — 97112 NEUROMUSCULAR REEDUCATION: CPT | Performed by: PHYSICAL MEDICINE & REHABILITATION

## 2024-07-25 NOTE — PROGRESS NOTES
"Daily Note     Today's date: 2024  Patient name: Valentin Reid  : 1980  MRN: [id number]  Referring provider: Kamron Pedro, *  Dx:   Encounter Diagnosis     ICD-10-CM    1. Right elbow pain  M25.521       2. Right lateral epicondylitis  M77.11                    Subjective: Patient offers no new complaints to begin session, continues to feel well regarding R elbow.     Objective: See treatment diary below    Assessment: Patient tolerated treatment well. Fatigued end of session, specifically noting shoulder fatigue with therabar twists. Continue as able nv.     Plan: Continue per plan of care.      Diagnosis:    Precautions:    POC Expires Auth Status Start Date Expiration Date PT Visit Limit     WC - given 18 visits NA NA 18   Date 7/22 7/25  7/15 7/18   Used 16 17  14 15   Remaining 2 1  4 3   Manuals        PROM        Mobs        IASTM        EPAT                There Ex        UBE  2/2 L2 2'/2'   2/2   Wrist Flexion Stretch  Scap stab w/ ball at wall 20x ea YMB      Radial nerve glides        Doorway Stretch 10x10 sec  10x10\"  10\" x 10     Reid Slides        Push-Up Plus                Patient Education         Neruo Re-Ed        Scap stab on the wall        Rows        Shld Ext 21# 3x15  21# 3x15      Horiz ABD        Bent Over Lower Trap        ER / SL ER   No money BlkTB 2x10  Hitchers blkTB 2x10 No money BlkTB 2x10  Hitchers blkTB 2x10  No Money Black TB 2x10  Hitchers Black TB 2x10    IR        Wall Climbs Climbs GTB 2x10  BTB 10x  Climbs Green TB 3x5 Climbs green TB 2x10   KB twists      2x15 10#    Wrist Extension Eccentrics        Therabar Rajesh twists 3x15     Blue 2x15 U, Ns Rajesh twists 3x15     Blue 2x15 U/ Ns  Green 2x15 U's, N's    Rajesh Twists 2x15 Rajesh twists 3x15    Blue 2x15 U's, N's    Hammer        Scap Stab w/ Tband  Green 2x5  Blue 2x5      Axe chop    18# 2x10  18# 2x10   Therabar      30\"x3 blue fwd/ lat    Low row 28# 3x15  28# 3x15  25# 3x10  28# " 3x15   Shoulder flexion into bicep curls                  Ther Act                          Modalities        CP PRN

## 2024-07-29 ENCOUNTER — OFFICE VISIT (OUTPATIENT)
Dept: PHYSICAL THERAPY | Facility: CLINIC | Age: 44
End: 2024-07-29
Payer: OTHER MISCELLANEOUS

## 2024-07-29 DIAGNOSIS — M25.521 RIGHT ELBOW PAIN: Primary | ICD-10-CM

## 2024-07-29 DIAGNOSIS — M77.11 RIGHT LATERAL EPICONDYLITIS: ICD-10-CM

## 2024-07-29 PROCEDURE — 97112 NEUROMUSCULAR REEDUCATION: CPT | Performed by: PHYSICAL THERAPIST

## 2024-07-29 PROCEDURE — 97110 THERAPEUTIC EXERCISES: CPT | Performed by: PHYSICAL THERAPIST

## 2024-07-29 NOTE — PROGRESS NOTES
PT Re-Evaluation     Today's date: 2024  Patient name: Valentin Reid  : 1980  MRN: [id number]  Referring provider: Kamron Pedro, *  Dx:   Encounter Diagnosis     ICD-10-CM    1. Right elbow pain  M25.521       2. Right lateral epicondylitis  M77.11                          Assessment  Impairments: abnormal muscle firing, abnormal or restricted ROM, activity intolerance, impaired physical strength, lacks appropriate home exercise program, pain with function, poor posture  and poor body mechanics    Assessment details: Demi Reid is a 43 y.o. male who has been consistent with attending and participating in skilled PT sessions.  The patient has done well with PT overall.  He has been able to note decreased pain and has been able to demonstrate increased strength.  He has had an easier time using his UE and has been happy with his progress.  The patient will continue to benefit from further PT sessions to further improve his strength and to finalize a HEP.      Understanding of Dx/Px/POC: good     Prognosis: good    Goals  Impairment Goals  - Decrease pain by 50% in 6 weeks - MET   - Increase right flexibility by 50% in 6 weeks - MET  - Increase right upper extremity strength golbally to 4+/5 in 6 weeks - MET   - Increase right  strength >= left  strength in 6 weeks - MET    Functional Goals  - Return to Prior Level of Function in 6 weeks - PARTIALLY MET   - Patient will be independent with HEP in 6 weeks - PARTIALLY MET   - Patient will be able to  with decreased pain in 6 weeks - MET   - Patient will be able to carry a box with decreased pain in 6 weeks - MET       Plan  Patient would benefit from: skilled physical therapy  Planned modality interventions: cryotherapy, thermotherapy: hydrocollator packs and TENS    Planned therapy interventions: home exercise program, graded exercise, functional ROM exercises, flexibility, body mechanics training, postural training,  "patient education, therapeutic activities, therapeutic exercise, manual therapy, joint mobilization and neuromuscular re-education    Frequency: 2x week  Duration in weeks: 4  Treatment plan discussed with: patient        Subjective Evaluation    History of Present Illness  Mechanism of injury: Patient feels that he has improved to close 100%.  He notes that he has improved with his pain.  He notes that he can lift and carry items.  He notes that he has been able to complete all ADLs without pain and feels that his strength has returned.      HPI:  Patient notes that he originally hurt his elbow at work when he went to  a chain when he felt pain in his elbow.  He noted having weakness in his right arm and then noted having some numbness when he was resting his arm.  He noted having pain with his ROM.  He reported his injury and then he went to see a physician where he was then referred to PT and was recommended MRI.  He now presents for his PT evaluation.      Pain Location:  R Elbow   Occupation:    Prior Functional Limitations:  Independent prior   AGG:  Grabbing, gripping, extending arm   Ease:  Rest / not move arm   Patient Goals:  \"I want to be able to use my full  and ROM\"      Pain  Current pain ratin  At best pain ratin  At worst pain ratin          Objective     Tenderness     Right Elbow   No tenderness in the lateral epicondyle.     Right Wrist/Hand   No tenderness in the lateral epicondyle.     Active Range of Motion     Right Elbow   Flexion: WFL and with pain  Extension: WFL and with pain  Forearm supination: WFL and with pain  Forearm pronation: WFL and with pain    Right Wrist   Wrist extension: with pain    Additional Active Range of Motion Details  Patient with full ROM, but notes pain with full extension, flexion, and supination/pronation  Right wrist extension - WNL   Right wrist flexion - WNL    Strength/Myotome Testing     Left Elbow   Normal strength    Right " "Elbow   Flexion: 4  Extension: 4  Forearm supination: 4-  Forearm pronation: 4    Left Wrist/Hand   Normal wrist strength     (2nd hand position)     Trial 1: 85    Trial 2: 70    Trial 3: 95    Average: 83.33    Right Wrist/Hand   Wrist extension: 4+  Wrist flexion: 4+  Radial deviation: 4+  Ulnar deviation: 4+     (2nd hand position)     Trial 1: 140    Trial 2: 140    Trial 3: 140    Average: 140    Tests     Right Elbow   Positive [employer].                Diagnosis:    Precautions:    POC Expires Auth Status Start Date Expiration Date PT Visit Limit    6/27 WC - given 18 visits NA NA 18   Date 7/22 7/25 7/29 7/18   Used 16 17 18  15   Remaining 2 1 0  3   Manuals        PROM        Mobs        IASTM        EPAT                There Ex  7/25      UBE  2/2 L2 2'/2'   2/2   Wrist Flexion Stretch  Scap stab w/ ball at wall 20x ea YMB      Radial nerve glides        Doorway Stretch 10x10 sec  10x10\" 10\" x 6     Reid Slides        Push-Up Plus                Patient Education    RT - RE      Neruo Re-Ed  7/25      Scap stab on the wall        Rows        Shld Ext 21# 3x15  21# 3x15 21.5# 3x15     Horiz ABD        Bent Over Lower Trap        ER / SL ER   No money BlkTB 2x10  Hitchers blkTB 2x10 No money BlkTB 2x10  Hitchers blkTB 2x10 Black TB Hitchers 2x10    Black TB No Money 2x10      IR        Wall Climbs Climbs GTB 2x10  BTB 10x Blue TB 2x10  Climbs green TB 2x10   KB twists      2x15 10#    Wrist Extension Eccentrics        Therabar Rajesh twists 3x15     Blue 2x15 U, Ns Rajesh twists 3x15     Blue 2x15 U/ Ns   Rajesh twists 3x15    Blue 2x15 U's, N's    Hammer        Scap Stab w/ Tband  Green 2x5  Blue 2x5      Axe chop     18# 2x10   Therabar      30\"x3 blue fwd/ lat    Low row 28# 3x15  28# 3x15 28.5# 3x15  28# 3x15   Shoulder flexion into bicep curls                  Ther Act                          Modalities        CP PRN                             "

## 2024-07-29 NOTE — LETTER
2024    Kamron Pedro MD  [address] [place] [id number]    Patient: Valentin Reid   YOB: 1980   Date of Visit: 2024     Encounter Diagnosis     ICD-10-CM    1. Right elbow pain  M25.521       2. Right lateral epicondylitis  M77.11           Dear Dr. Pedro:    Thank you for your recent referral of Valentin Reid. Please review the attached evaluation summary from Valentin's recent visit.     Please verify that you agree with the plan of care by signing the attached order.     If you have any questions or concerns, please do not hesitate to call.     I sincerely appreciate the opportunity to share in the care of one of your patients and hope to have another opportunity to work with you in the near future.       Sincerely,    Keith Rodriguez, PT      Referring Provider:      I certify that I have read the below Plan of Care and certify the need for these services furnished under this plan of treatment while under my care.                    Kamron Pedro MD  [address] [place] [id number]  Via Fax: [phone]          PT Re-Evaluation     Today's date: 2024  Patient name: Valentin Reid  : 1980  MRN: [id number]  Referring provider: Kamron Pedro, *  Dx:   Encounter Diagnosis     ICD-10-CM    1. Right elbow pain  M25.521       2. Right lateral epicondylitis  M77.11                          Assessment  Impairments: abnormal muscle firing, abnormal or restricted ROM, activity intolerance, impaired physical strength, lacks appropriate home exercise program, pain with function, poor posture  and poor body mechanics    Assessment details: Demi Reid is a 43 y.o. male who has been consistent with attending and participating in skilled PT sessions.  The patient has done well with PT overall.  He has been able to note decreased pain and has been able to demonstrate increased strength.  He has  had an easier time using his UE and has been happy with his progress.  The patient will continue to benefit from further PT sessions to further improve his strength and to finalize a HEP.      Understanding of Dx/Px/POC: good     Prognosis: good    Goals  Impairment Goals  - Decrease pain by 50% in 6 weeks - MET   - Increase right flexibility by 50% in 6 weeks - MET  - Increase right upper extremity strength golbally to 4+/5 in 6 weeks - MET   - Increase right  strength >= left  strength in 6 weeks - MET    Functional Goals  - Return to Prior Level of Function in 6 weeks - PARTIALLY MET   - Patient will be independent with HEP in 6 weeks - PARTIALLY MET   - Patient will be able to  with decreased pain in 6 weeks - MET   - Patient will be able to carry a box with decreased pain in 6 weeks - MET       Plan  Patient would benefit from: skilled physical therapy  Planned modality interventions: cryotherapy, thermotherapy: hydrocollator packs and TENS    Planned therapy interventions: home exercise program, graded exercise, functional ROM exercises, flexibility, body mechanics training, postural training, patient education, therapeutic activities, therapeutic exercise, manual therapy, joint mobilization and neuromuscular re-education    Frequency: 2x week  Duration in weeks: 4  Treatment plan discussed with: patient        Subjective Evaluation    History of Present Illness  Mechanism of injury: Patient feels that he has improved to close 100%.  He notes that he has improved with his pain.  He notes that he can lift and carry items.  He notes that he has been able to complete all ADLs without pain and feels that his strength has returned.      HPI:  Patient notes that he originally hurt his elbow at work when he went to  a chain when he felt pain in his elbow.  He noted having weakness in his right arm and then noted having some numbness when he was resting his arm.  He noted having pain with his ROM.   "He reported his injury and then he went to see a physician where he was then referred to PT and was recommended MRI.  He now presents for his PT evaluation.      Pain Location:  R Elbow   Occupation:    Prior Functional Limitations:  Independent prior   AGG:  Grabbing, gripping, extending arm   Ease:  Rest / not move arm   Patient Goals:  \"I want to be able to use my full  and ROM\"      Pain  Current pain ratin  At best pain ratin  At worst pain ratin          Objective     Tenderness     Right Elbow   No tenderness in the lateral epicondyle.     Right Wrist/Hand   No tenderness in the lateral epicondyle.     Active Range of Motion     Right Elbow   Flexion: WFL and with pain  Extension: WFL and with pain  Forearm supination: WFL and with pain  Forearm pronation: WFL and with pain    Right Wrist   Wrist extension: with pain    Additional Active Range of Motion Details  Patient with full ROM, but notes pain with full extension, flexion, and supination/pronation  Right wrist extension - WNL   Right wrist flexion - WNL    Strength/Myotome Testing     Left Elbow   Normal strength    Right Elbow   Flexion: 4  Extension: 4  Forearm supination: 4-  Forearm pronation: 4    Left Wrist/Hand   Normal wrist strength     (2nd hand position)     Trial 1: 85    Trial 2: 70    Trial 3: 95    Average: 83.33    Right Wrist/Hand   Wrist extension: 4+  Wrist flexion: 4+  Radial deviation: 4+  Ulnar deviation: 4+     (2nd hand position)     Trial 1: 140    Trial 2: 140    Trial 3: 140    Average: 140    Tests     Right Elbow   Positive [employer].                Diagnosis:    Precautions:    POC Expires Auth Status Start Date Expiration Date PT Visit Limit     WC - given 18 visits NA NA 18   Date    Used 16 17 18  15   Remaining 2 1 0  3   Manuals        PROM        Mobs        IASTM        EPAT                There Ex        UBE  2/2 L2 2'/2'   2/2   Wrist Flexion " "Stretch  Scap stab w/ ball at wall 20x ea YMB      Radial nerve glides        Doorway Stretch 10x10 sec  10x10\" 10\" x 6     Reid Slides        Push-Up Plus                Patient Education    RT - RE      Neruo Re-Ed  7/25      Scap stab on the wall        Rows        Shld Ext 21# 3x15  21# 3x15 21.5# 3x15     Horiz ABD        Bent Over Lower Trap        ER / SL ER   No money BlkTB 2x10  Hitchers blkTB 2x10 No money BlkTB 2x10  Hitchers blkTB 2x10 Black TB Hitchers 2x10    Black TB No Money 2x10      IR        Wall Climbs Climbs GTB 2x10  BTB 10x Blue TB 2x10  Climbs green TB 2x10   KB twists      2x15 10#    Wrist Extension Eccentrics        Therabar Rajesh twists 3x15     Blue 2x15 U, Ns Rajesh twists 3x15     Blue 2x15 U/ Ns   Rajesh twists 3x15    Blue 2x15 U's, N's    Hammer        Scap Stab w/ Tband  Green 2x5  Blue 2x5      Axe chop     18# 2x10   Therabar      30\"x3 blue fwd/ lat    Low row 28# 3x15  28# 3x15 28.5# 3x15  28# 3x15   Shoulder flexion into bicep curls                  Ther Act                          Modalities        CP PRN                                             "

## 2024-08-01 ENCOUNTER — OFFICE VISIT (OUTPATIENT)
Dept: PHYSICAL THERAPY | Facility: CLINIC | Age: 44
End: 2024-08-01
Payer: OTHER MISCELLANEOUS

## 2024-08-01 DIAGNOSIS — M77.11 RIGHT LATERAL EPICONDYLITIS: ICD-10-CM

## 2024-08-01 DIAGNOSIS — M25.521 RIGHT ELBOW PAIN: Primary | ICD-10-CM

## 2024-08-01 PROCEDURE — 97112 NEUROMUSCULAR REEDUCATION: CPT | Performed by: PHYSICAL THERAPIST

## 2024-08-01 PROCEDURE — 97110 THERAPEUTIC EXERCISES: CPT | Performed by: PHYSICAL THERAPIST

## 2024-08-01 NOTE — PROGRESS NOTES
"Daily Note     Today's date: 2024  Patient name: Valentin Reid  : 1980  MRN: [id number]  Referring provider: Kamron Pedro, *  Dx:   Encounter Diagnosis     ICD-10-CM    1. Right elbow pain  M25.521       2. Right lateral epicondylitis  M77.11                      Subjective: Patient notes that he is till feeling good.  He also notes that he will be seeing his doctor on Monday for follow-up.        Objective: See treatment diary below      Assessment: Tolerated treatment well. Patient would benefit from continued PT.  Patient doing well overall.  No complaints of pain during or post session.  Continuing to work on improving overall UE strength with emphasis on his elbow.  Patient to see his worker's comp doctor tomorrow       Plan: Progress treatment as tolerated.       Diagnosis:    Precautions:    POC Expires Auth Status Start Date Expiration Date PT Visit Limit     WC - given 18 visits NA NA 6   Date     Used 16 17 18 5    Remaining 2 1 0 1    Manuals        PROM        Mobs        IASTM        EPAT                There Ex        UBE  2/2 L2 2'/2'  2/2 - ROM    Wrist Flexion Stretch  Scap stab w/ ball at wall 20x ea YMB      Radial nerve glides        Doorway Stretch 10x10 sec  10x10\" 10\" x 6     Reid Slides        Push-Up Plus                Patient Education    RT - RE  RT    Neruo Re-Ed        Scap stab on the wall        Rows        Shld Ext 21# 3x15  21# 3x15 21.5# 3x15 22# 2x12     Horiz ABD        Bent Over Lower Trap        ER / SL ER   No money BlkTB 2x10  Hitchers blkTB 2x10 No money BlkTB 2x10  Hitchers blkTB 2x10 Black TB Hitchers 2x10    Black TB No Money 2x10  Black TB No Money 2x10    Black TB Hitchers 2x10    IR        Wall Climbs Climbs GTB 2x10  BTB 10x Blue TB 2x10 Blue TB 2x10    KB twists         Wrist Extension Eccentrics        Therabar Rajesh twists 3x15     Blue 2x15 U, Ns Rajesh twists 3x15     Blue 2x15 U/ Ns  Rajesh twists " x20    Blue 2x15 u/Ns     Hammer        Scap Stab w/ Tband  Green 2x5  Blue 2x5      Axe chop        Therabar         Low row 28# 3x15  28# 3x15 28.5# 3x15 28.5# 3x12     Shoulder flexion into bicep curls        PNF    Pink 2x10              Ther Act                          Modalities        CP PRN

## 2024-08-05 ENCOUNTER — APPOINTMENT (OUTPATIENT)
Dept: PHYSICAL THERAPY | Facility: CLINIC | Age: 44
End: 2024-08-05
Payer: OTHER MISCELLANEOUS

## 2024-08-08 ENCOUNTER — OFFICE VISIT (OUTPATIENT)
Dept: PHYSICAL THERAPY | Facility: CLINIC | Age: 44
End: 2024-08-08
Payer: OTHER MISCELLANEOUS

## 2024-08-08 DIAGNOSIS — M77.11 RIGHT LATERAL EPICONDYLITIS: ICD-10-CM

## 2024-08-08 DIAGNOSIS — M25.521 RIGHT ELBOW PAIN: Primary | ICD-10-CM

## 2024-08-08 PROCEDURE — 97112 NEUROMUSCULAR REEDUCATION: CPT | Performed by: PHYSICAL THERAPIST

## 2024-08-08 PROCEDURE — 97110 THERAPEUTIC EXERCISES: CPT | Performed by: PHYSICAL THERAPIST

## 2024-08-08 NOTE — PROGRESS NOTES
"Daily Note     Today's date: 2024  Patient name: Valentin Reid  : 1980  MRN: [id number]  Referring provider: Kamron Pedro, *  Dx:   Encounter Diagnosis     ICD-10-CM    1. Right elbow pain  M25.521       2. Right lateral epicondylitis  M77.11                      Subjective: Patient notes that he saw his workers comp doctor and was cleared to go back to work.        Objective: See treatment diary below      Assessment: Tolerated treatment well. Patient would benefit from continued PT.  Patient doing well overall.  Able to tolerate his first few nights of work.  Per worker's comp doctor and , patient is to finish out his scheduled visits for his elbow and then discharge from PT at that time.  Continue to progress strength as able.        Plan: Progress treatment as tolerated.       Diagnosis:    Precautions:    POC Expires Auth Status Start Date Expiration Date PT Visit Limit     WC - given 18 visits NA NA 6   Date    Used 16 17 18 5 4   Remaining 2 1 0 1 2   Manuals        PROM        Mobs        IASTM        EPAT                There Ex        UBE  2/2 L2 2'/2'  2/2 - ROM 2/2- rOM   Wrist Flexion Stretch  Scap stab w/ ball at wall 20x ea YMB      Radial nerve glides        Doorway Stretch 10x10 sec  10x10\" 10\" x 6     Reid Slides        Push-Up Plus                Patient Education    RT - RE  RT RT    Neruo Re-Ed        Scap stab on the wall        Rows        Shld Ext 21# 3x15  21# 3x15 21.5# 3x15 22# 2x12  23.9# 3x10   Horiz ABD        Bent Over Lower Trap        ER / SL ER   No money BlkTB 2x10  Hitchers blkTB 2x10 No money BlkTB 2x10  Hitchers blkTB 2x10 Black TB Hitchers 2x10    Black TB No Money 2x10  Black TB No Money 2x10    Black TB Hitchers 2x10 Black TB No Money 2x10     Black Hitchers 2x10   IR        Wall Climbs Climbs GTB 2x10  BTB 10x Blue TB 2x10 Blue TB 2x10 Blue TB 2x10    KB twists         Wrist Extension Eccentrics      "   Therabar Rajesh twists 3x15     Blue 2x15 U, Ns Rajesh twists 3x15     Blue 2x15 U/ Ns  Rajesh twists x20    Blue 2x15 u/Ns  Rajesh twists Blue 2x10    Blue 2x10 U/Ns   Hammer        Scap Stab w/ Tband  Green 2x5  Blue 2x5      Axe chop        Therabar         Low row 28# 3x15  28# 3x15 28.5# 3x15 28.5# 3x12  28.5# 3x10   Shoulder flexion into bicep curls        PNF    Pink 2x10 Pink 2x10              Ther Act                          Modalities        CP PRN

## 2024-08-09 ENCOUNTER — OFFICE VISIT (OUTPATIENT)
Dept: OBGYN CLINIC | Facility: CLINIC | Age: 44
End: 2024-08-09
Payer: COMMERCIAL

## 2024-08-09 ENCOUNTER — APPOINTMENT (OUTPATIENT)
Dept: RADIOLOGY | Facility: AMBULARY SURGERY CENTER | Age: 44
End: 2024-08-09
Attending: STUDENT IN AN ORGANIZED HEALTH CARE EDUCATION/TRAINING PROGRAM
Payer: COMMERCIAL

## 2024-08-09 VITALS — HEIGHT: 73 IN | WEIGHT: 315 LBS | BODY MASS INDEX: 41.75 KG/M2

## 2024-08-09 DIAGNOSIS — M25.561 RIGHT KNEE PAIN, UNSPECIFIED CHRONICITY: ICD-10-CM

## 2024-08-09 DIAGNOSIS — M17.11 PRIMARY OSTEOARTHRITIS OF RIGHT KNEE: Primary | ICD-10-CM

## 2024-08-09 DIAGNOSIS — S83.206A TEAR OF MENISCUS OF RIGHT KNEE, UNSPECIFIED MENISCUS, UNSPECIFIED TEAR TYPE, UNSPECIFIED WHETHER OLD OR CURRENT TEAR: ICD-10-CM

## 2024-08-09 DIAGNOSIS — M54.50 CHRONIC BILATERAL LOW BACK PAIN, UNSPECIFIED WHETHER SCIATICA PRESENT: ICD-10-CM

## 2024-08-09 DIAGNOSIS — G89.29 CHRONIC BILATERAL LOW BACK PAIN, UNSPECIFIED WHETHER SCIATICA PRESENT: ICD-10-CM

## 2024-08-09 PROCEDURE — 99203 OFFICE O/P NEW LOW 30 MIN: CPT | Performed by: STUDENT IN AN ORGANIZED HEALTH CARE EDUCATION/TRAINING PROGRAM

## 2024-08-09 PROCEDURE — 73562 X-RAY EXAM OF KNEE 3: CPT

## 2024-08-09 RX ORDER — IBUPROFEN 600 MG/1
600 TABLET ORAL EVERY 6 HOURS PRN
COMMUNITY
Start: 2024-07-11

## 2024-08-09 NOTE — PROGRESS NOTES
Ortho Sports Medicine Knee New Patient Visit     Assesment:   43 y.o. male right knee osteoarthritis with previous history of meniscus tear    Plan:  The patient's diagnosis and treatment were discussed at length today. We discussed no treatment, non-operative treatment, and operative treatment.    Valentin presents today for initial evaluation right knee osteoarthritis with previous history of meniscus tear.  I discussed with him he can treat this nonoperatively with a combination of activity modification, physical therapy, bracing, and cortisone injection.  During today's visit he states that his knee pain is improving and wishes to hold off on a cortisone injection and a knee brace at this time.  I did provide her with a referral to outpatient physical therapy for hip and leg strengthening and range of motion exercises.  He also mentions chronic low back pain and I did provide him with a referral to spine and pain.  He can continue activity as tolerated using pain as a guide.  He continue ice and over-the-counter medication as needed for pain relief.  He can follow-up me on as-needed basis.    Conservative treatment:    Ice to knee for 20 minutes at least 1-2 times daily.  PT for ROM/strengthening to knee, hip and core.  OTC NSAIDS prn for pain.  Let pain guide gradual return activities.  Referral to spine and pain for low back pain    Imaging:    All imaging from today was reviewed by myself and explained to the patient.       Injection:    No Injection planned at this time.      Surgery:     No surgery is recommended at this point, continue with conservative treatment plan as noted.      Follow up:    Return if symptoms worsen or fail to improve.        Chief Complaint   Patient presents with    Right Knee - Pain, Swelling     Feels tight        History of Present Illness:    The patient is a 43 y.o. male whose occupation is a , referred to me by themself, seen in clinic for evaluation of right knee  pain.  He states he has been having ongoing right knee pain now for approximately 4 to 5 months without any recent injury or trauma.  He states that he does have a prior injury approximately 5 years ago when he was going down and felt medial knee pain.  He states that he did have an MRI obtained in [place] that demonstrated meniscus tear that was treated nonsurgically.  He states recently has been having anterior medial knee pain which she describes as dull and achy that is worse with deep squatting, prolonged walking, kneeling, and going up and down steps.  He denies any instability.  He denies a mechanical symptoms or catching or locking.  He has not attempted any knee cortisone injections, however he recently had a elbow cortisone injection which he states he feels like his knee pain improved after this injection we will.  He denies any numbness or tingling.      Knee Surgical History:  None    Past Medical, Social and Family History:  History reviewed. No pertinent past medical history.  History reviewed. No pertinent surgical history.  No Known Allergies  Current Outpatient Medications on File Prior to Visit   Medication Sig Dispense Refill    ibuprofen (MOTRIN) 600 mg tablet 600 mg every 6 (six) hours as needed       No current facility-administered medications on file prior to visit.     Social History     Socioeconomic History    Marital status: /Civil Union     Spouse name: Not on file    Number of children: Not on file    Years of education: Not on file    Highest education level: Not on file   Occupational History    Not on file   Tobacco Use    Smoking status: Not on file    Smokeless tobacco: Not on file   Substance and Sexual Activity    Alcohol use: Not on file    Drug use: Not on file    Sexual activity: Not on file   Other Topics Concern    Not on file   Social History Narrative    Not on file     Social Determinants of Health     Financial Resource Strain: Low Risk  (11/8/2023)     Received from [facility]    Overall Financial Resource Strain (CARDIA)     Difficulty of Paying Living Expenses: Not very hard   Food Insecurity: No Food Insecurity (11/8/2023)    Received from [facility]    Hunger Vital Sign     Worried About Running Out of Food in the Last Year: Never true     Ran Out of Food in the Last Year: Never true   Transportation Needs: No Transportation Needs (11/8/2023)    Received from [facility]    PRAPARE - Transportation     Lack of Transportation (Medical): No     Lack of Transportation (Non-Medical): No   Physical Activity: Not on file   Stress: No Stress Concern Present (11/8/2023)    Received from [facility]    [place] [employer] - Occupational Stress Questionnaire     Feeling of Stress : Only a little   Social Connections: Moderately Integrated (11/8/2023)    Received from [facility]    Social Connection and Isolation Panel [NHANES]     Frequency of Communication with Friends and Family: More than three times a week     Frequency of Social Gatherings with Friends and Family: Never     Attends Religion Services: More than 4 times per year     Active Member of Clubs or Organizations: No     Attends Club or Organization Meetings: Never     Marital Status:    Intimate Partner Violence: Not At Risk (11/8/2023)    Received from [facility]    Humiliation, Afraid, Rape, and Kick questionnaire     Fear of Current or Ex-Partner: No     Emotionally Abused: No     Physically Abused: No     Sexually Abused: No   Housing Stability: Not on file         I have reviewed the past medical, surgical, social and family history, medications and allergies as documented in the EMR.    Review of systems: ROS is negative other than that noted in the HPI.  Constitutional: Negative for fatigue and fever.   HENT: Negative for sore throat.    Respiratory: Negative for  "shortness of breath.    Cardiovascular: Negative for chest pain.   Gastrointestinal: Negative for abdominal pain.   Endocrine: Negative for cold intolerance and heat intolerance.   Genitourinary: Negative for flank pain.   Musculoskeletal: Negative for back pain.   Skin: Negative for rash.   Allergic/Immunologic: Negative for immunocompromised state.   Neurological: Negative for dizziness.   Psychiatric/Behavioral: Negative for agitation.      Physical Exam:    Height 6' 1\" (1.854 m), weight (!) 167 kg (369 lb).    General/Constitutional: NAD, well developed, well nourished  HENT: Normocephalic, atraumatic  CV: Intact distal pulses, regular rate  Resp: No respiratory distress or labored breathing  Lymphatic: No lymphadenopathy palpated  Neuro: Alert and Oriented x 3, no focal deficits  Psych: Normal mood, normal affect, normal judgement, normal behavior  Skin: Warm, dry, no rashes, no erythema      Knee Exam (focused):  Visual inspection of the right knee demonstrates normal contour without atrophy.   No previous incisions   There is no significant erythema or edema.    No significant joint effusion   Range of motion is full from 0-130 degrees of flexion   Able to straight leg raise   Mild patellar tender to palpation  Positive medial joint line tenderness, no lateral joint line tenderness  Negative medial Dandre's, negative lateral Dandre's  1 Lachman exam, stable posterior drawer  Negative dial test  Stable to varus and valgus stress at both 0 and 30°  Patella tracks normally.  No J sign.  No apprehension.  Translation is approximately 2 quadrants and is equal to the contralateral side  Patellar eversion is similar to the contralateral side    Examination of the patient's ipsilateral hip demonstrates full painless range of motion.  No crepitus.      LE NV Exam: +2 DP/PT pulses bilaterally  Sensation intact to light touch L2-S1 bilaterally     Bilateral hip ROM demonstrates no pain actively or passively    No " calf tenderness to palpation bilaterally    Knee Imaging    X-rays of the right knee were reviewed, which demonstrate moderate medial compartment osteoarthritis.  I have reviewed the radiology report and do not currently have a radiology reading from [facility], but will check the result once the reading is performed.    Scribe Attestation      I,:  David Peña am acting as a scribe while in the presence of the attending physician.:       I,:  Rajesh Escalona, DO personally performed the services described in this documentation    as scribed in my presence.:

## 2024-08-12 ENCOUNTER — OFFICE VISIT (OUTPATIENT)
Dept: PHYSICAL THERAPY | Facility: CLINIC | Age: 44
End: 2024-08-12
Payer: OTHER MISCELLANEOUS

## 2024-08-12 DIAGNOSIS — M77.11 RIGHT LATERAL EPICONDYLITIS: ICD-10-CM

## 2024-08-12 DIAGNOSIS — M25.521 RIGHT ELBOW PAIN: Primary | ICD-10-CM

## 2024-08-12 PROCEDURE — 97112 NEUROMUSCULAR REEDUCATION: CPT | Performed by: PHYSICAL THERAPIST

## 2024-08-12 PROCEDURE — 97110 THERAPEUTIC EXERCISES: CPT | Performed by: PHYSICAL THERAPIST

## 2024-08-12 NOTE — PROGRESS NOTES
"Daily Note     Today's date: 2024  Patient name: Valentin Reid  : 1980  MRN: [id number]  Referring provider: Kamron Pedro, *  Dx:   Encounter Diagnosis     ICD-10-CM    1. Right elbow pain  M25.521       2. Right lateral epicondylitis  M77.11                      Subjective: Patient continues to note feeling good overall.        Objective: See treatment diary below      Assessment: Tolerated treatment well. Patient would benefit from continued PT.  Patient doing very well overall.  Has been able to be back at work without any ill effects.  Tolerating all PREs.  No complaints of pain s/p session.  Continue to progress as able.        Plan: Progress treatment as tolerated.       Diagnosis:    Precautions:    POC Expires Auth Status Start Date Expiration Date PT Visit Limit     WC - given 18 visits NA NA 6   Date    Used 3  18 1 2   Remaining 3  0 15 4   Manuals        PROM        Mobs        IASTM        EPAT                There Ex        UBE  2/2 - ROM   2/2 - ROM 2/2- rOM   Wrist Flexion Stretch        Radial nerve glides        Doorway Stretch   10\" x 6     Reid Slides        Push-Up Plus                Patient Education  RT  RT - RE  RT RT    Neruo Re-Ed        Scap stab on the wall        Rows        Shld Ext 24# 3x10  21.5# 3x15 22# 2x12  23.9# 3x10   Horiz ABD        Bent Over Lower Trap        ER / SL ER   Black TB No Money 2x10    Black Hitchers 2x10   Black TB Hitchers 2x10    Black TB No Money 2x10  Black TB No Money 2x10    Black TB Hitchers 2x10 Black TB No Money 2x10     Black Hitchers 2x10   IR        Wall Climbs Blue 2x10  Blue TB 2x10 Blue TB 2x10 Blue TB 2x10    KB twists         Wrist Extension Eccentrics        Therabar Rajesh Twists blue 2x10  Blue 2x10 U/Ns   Rajesh twists x20    Blue 2x15 u/Ns  Rajesh twists Blue 2x10    Blue 2x10 U/Ns   Hammer        Scap Stab w/ Tband  Blue 2x5       Axe chop        Therabar         Low row 29# 3x10  28.5# 3x15 " 28.5# 3x12  28.5# 3x10   Shoulder flexion into bicep curls        PNF    Pink 2x10 Pink 2x10              Ther Act                          Modalities        CP PRN

## 2024-08-15 ENCOUNTER — OFFICE VISIT (OUTPATIENT)
Dept: PHYSICAL THERAPY | Facility: CLINIC | Age: 44
End: 2024-08-15
Payer: OTHER MISCELLANEOUS

## 2024-08-15 DIAGNOSIS — M77.11 RIGHT LATERAL EPICONDYLITIS: ICD-10-CM

## 2024-08-15 DIAGNOSIS — M25.521 RIGHT ELBOW PAIN: Primary | ICD-10-CM

## 2024-08-15 PROCEDURE — 97112 NEUROMUSCULAR REEDUCATION: CPT | Performed by: PHYSICAL THERAPIST

## 2024-08-15 PROCEDURE — 97110 THERAPEUTIC EXERCISES: CPT | Performed by: PHYSICAL THERAPIST

## 2024-08-15 NOTE — PROGRESS NOTES
Daily Note     Today's date: 8/15/2024  Patient name: Valentin Reid  : 1980  MRN: [id number]  Referring provider: Kamron Pedro, *  Dx:   Encounter Diagnosis     ICD-10-CM    1. Right elbow pain  M25.521       2. Right lateral epicondylitis  M77.11                      Subjective: Patient notes doing OK at work.        Objective: See treatment diary below      Assessment: Tolerated treatment well. Patient would benefit from continued PT.  Patient doing well overall.  Able to complete all PREs.  No complaints of pain s/p session.  Continue to progress as able.        Plan: Progress treatment as tolerated.       Diagnosis:    Precautions:    POC Expires Auth Status Start Date Expiration Date PT Visit Limit     WC - given 18 visits NA NA 6   Date 8/12 8/15  8/1 8/8   Used 3 4  1 2   Remaining 3 2  15 4   Manuals        PROM        Mobs        IASTM        EPAT                There Ex        UBE  2/2 - ROM 2/2 - ROM  2/2 - ROM 2/2- rOM   Wrist Flexion Stretch        Radial nerve glides        Doorway Stretch        Reid Slides        Push-Up Plus                Patient Education  RT RT  RT RT    Neruo Re-Ed        Scap stab on the wall        Rows        Shld Ext 24# 3x10 24# 3x10  22# 2x12  23.9# 3x10   Horiz ABD        Bent Over Lower Trap        ER / SL ER   Black TB No Money 2x10    Black Hitchers 2x10    Black TB No Money 2x10    Black TB Hitchers 2x10 Black TB No Money 2x10     Black Hitchers 2x10   IR        Wall Climbs Blue 2x10 Blue x10  Blue TB 2x10 Blue TB 2x10    KB twists         Wrist Extension Eccentrics        Therabar Rajesh Twists blue 2x10  Blue 2x10 U/Ns Rajesh Twists blue 2x10   Blue 2x10 U/Ns  Rajesh twists x20    Blue 2x15 u/Ns  Rajesh twists Blue 2x10    Blue 2x10 U/Ns   Hammer        Scap Stab w/ Tband  Blue 2x5       Axe chop        Therabar         Low row 29# 3x10 29# 3x10   28.5# 3x12  28.5# 3x10   Shoulder flexion into bicep curls        PNF    Pink 2x10 Pink 2x10     [place]  5# horiz ABD to press 2x10    Swings 5# 2x10                 Ther Act                          Modalities        CP PRN

## 2024-08-19 ENCOUNTER — OFFICE VISIT (OUTPATIENT)
Dept: PHYSICAL THERAPY | Facility: CLINIC | Age: 44
End: 2024-08-19
Payer: OTHER MISCELLANEOUS

## 2024-08-19 DIAGNOSIS — M25.521 RIGHT ELBOW PAIN: Primary | ICD-10-CM

## 2024-08-19 DIAGNOSIS — M77.11 RIGHT LATERAL EPICONDYLITIS: ICD-10-CM

## 2024-08-19 PROCEDURE — 97112 NEUROMUSCULAR REEDUCATION: CPT | Performed by: PHYSICAL THERAPIST

## 2024-08-19 NOTE — PROGRESS NOTES
Daily Note     Today's date: 2024  Patient name: Valentin Reid  : 1980  MRN: [id number]  Referring provider: Kamron Pedro, *  Dx:   Encounter Diagnosis     ICD-10-CM    1. Right elbow pain  M25.521       2. Right lateral epicondylitis  M77.11                      Subjective: Patient notes that he his elbow is still doing well with work.        Objective: See treatment diary below      Assessment: Tolerated treatment well. Patient would benefit from continued PT.  Patient has done well with PT.  Still able to tolerate all PREs.  Has been able to complete work tasks without pain.  Patient will be discharged at his next visit.        Plan: Progress treatment as tolerated.       Diagnosis:    Precautions:    POC Expires Auth Status Start Date Expiration Date PT Visit Limit     WC - given 18 visits NA NA 6   Date 8/12 8/15 8/19  8/8   Used 3 4 5  2   Remaining 3 2 1  4   Manuals        PROM        Mobs        IASTM        EPAT                There Ex        UBE  2/2 - ROM 2/2 - ROM   2/2- rOM   Wrist Flexion Stretch        Radial nerve glides        Doorway Stretch        Reid Slides        Push-Up Plus                Patient Education  RT RT   RT    Neruo Re-Ed        Scap stab on the wall        Rows        Shld Ext 24# 3x10 24# 3x10 24# 3x10  23.9# 3x10   Horiz ABD        Bent Over Lower Trap        ER / SL ER   Black TB No Money 2x10    Black Hitchers 2x10   Black TB No Money 2x10    Black TB Hitchers 2x10  Black TB No Money 2x10     Black Hitchers 2x10   IR        Wall Climbs Blue 2x10 Blue x10 Blue 2x10  Blue TB 2x10    KB twists         Wrist Extension Eccentrics        Therabar Rajesh Twists blue 2x10  Blue 2x10 U/Ns Rajesh Twists blue 2x10   Blue 2x10 U/Ns Rajesh Twists Blue 2x10  Blue 2x10 U/Ns  Rajesh twists Blue 2x10    Blue 2x10 U/Ns   Hammer        Scap Stab w/ Tband  Blue 2x5       Axe chop        Therabar         Low row 29# 3x10 29# 3x10  29# 3x10  28.5# 3x10   Shoulder  flexion into bicep curls        PNF     Pink 2x10    Kettlebell  5# horiz ABD to press 2x10    Swings 5# 2x10                 Ther Act                          Modalities        CP PRN

## 2024-08-22 ENCOUNTER — OFFICE VISIT (OUTPATIENT)
Dept: PHYSICAL THERAPY | Facility: CLINIC | Age: 44
End: 2024-08-22
Payer: OTHER MISCELLANEOUS

## 2024-08-22 DIAGNOSIS — M25.521 RIGHT ELBOW PAIN: Primary | ICD-10-CM

## 2024-08-22 DIAGNOSIS — M77.11 RIGHT LATERAL EPICONDYLITIS: ICD-10-CM

## 2024-08-22 PROCEDURE — 97110 THERAPEUTIC EXERCISES: CPT | Performed by: PHYSICAL THERAPIST

## 2024-08-22 PROCEDURE — 97112 NEUROMUSCULAR REEDUCATION: CPT | Performed by: PHYSICAL THERAPIST

## 2024-08-22 NOTE — PROGRESS NOTES
Discharge     Today's date: 2024  Patient name: Valentin Reid  : 1980  MRN: [id number]  Referring provider: Kamron Pedro, *  Dx:   Encounter Diagnosis     ICD-10-CM    1. Right elbow pain  M25.521       2. Right lateral epicondylitis  M77.11                      Subjective: Patient notes that he has improved 100%.        Objective: See treatment diary below      Assessment: Tolerated treatment well. Patient feels that he can do everything with his elbow.  He notes that he is not limited at work and is not hesitating with any motions.  He has demonstrated improved ROM and strength and has been pleased with his progress.  He will transition to his HEP at this time.  If Valentin were to need PT in the future, we would be happy to share in his car.e        Plan: Discharge to [facility].       Diagnosis:    Precautions:    POC Expires Auth Status Start Date Expiration Date PT Visit Limit     WC - given 18 visits NA NA 6   Date 8/12 8/15 8/19 8/22    Used 3 4 5 6    Remaining 3 2 1 0    Manuals        PROM        Mobs        IASTM        EPAT                There Ex        UBE  2/2 - ROM 2/2 - ROM  2/2 - ROM    Wrist Flexion Stretch        Radial nerve glides        Doorway Stretch        Reid Slides        Push-Up Plus                Patient Education  RT RT  RT - DC    Neruo Re-Ed        Scap stab on the wall        Rows        Shld Ext 24# 3x10 24# 3x10 24# 3x10 24.5# 3x10    Horiz ABD        Bent Over Lower Trap        ER / SL ER   Black TB No Money 2x10    Black Hitchers 2x10   Black TB No Money 2x10    Black TB Hitchers 2x10     IR        Wall Climbs Blue 2x10 Blue x10 Blue 2x10 Blue 2x10    KB twists         Wrist Extension Eccentrics        Therabar Rajesh Twists blue 2x10  Blue 2x10 U/Ns Rajesh Twists blue 2x10   Blue 2x10 U/Ns Rajesh Twists Blue 2x10  Blue 2x10 U/Ns Rajesh Twists Blue 2x10  U/Ns Blue 2x10    Hammer        Scap Stab w/ Tband  Blue 2x5   Blue x10    Axe chop        Therabar          Low row 29# 3x10 29# 3x10  29# 3x10 29# 3x10      Shoulder flexion into bicep curls        PNF        Kettlebell  5# horiz ABD to press 2x10    Swings 5# 2x10                 Ther Act                          Modalities        CP PRN

## 2024-08-27 ENCOUNTER — EVALUATION (OUTPATIENT)
Dept: PHYSICAL THERAPY | Facility: CLINIC | Age: 44
End: 2024-08-27
Payer: COMMERCIAL

## 2024-08-27 DIAGNOSIS — M54.50 CHRONIC BILATERAL LOW BACK PAIN, UNSPECIFIED WHETHER SCIATICA PRESENT: ICD-10-CM

## 2024-08-27 DIAGNOSIS — M17.11 PRIMARY OSTEOARTHRITIS OF RIGHT KNEE: ICD-10-CM

## 2024-08-27 DIAGNOSIS — G89.29 CHRONIC BILATERAL LOW BACK PAIN, UNSPECIFIED WHETHER SCIATICA PRESENT: ICD-10-CM

## 2024-08-27 PROCEDURE — 97162 PT EVAL MOD COMPLEX 30 MIN: CPT | Performed by: PHYSICAL THERAPIST

## 2024-08-27 PROCEDURE — 97110 THERAPEUTIC EXERCISES: CPT | Performed by: PHYSICAL THERAPIST

## 2024-08-27 NOTE — PROGRESS NOTES
PT Evaluation     Today's date: 2024  Patient name: Valentin Reid  : 1980  MRN: [id number]  Referring provider: Rajesh Escalona DO  Dx:   Encounter Diagnosis     ICD-10-CM    1. Primary osteoarthritis of right knee  M17.11 Ambulatory referral to Physical Therapy      2. Chronic bilateral low back pain, unspecified whether sciatica present  M54.50 Ambulatory referral to Physical Therapy    G89.29                      Assessment  Impairments: abnormal muscle firing, abnormal or restricted ROM, activity intolerance, impaired physical strength, lacks appropriate home exercise program, pain with function, poor posture  and poor body mechanics    Assessment details: Valentin Reid is a 43 y.o. male who presents to the clinic with complaints of right knee pain as well as lower back pain.  The patient presents with the above listed impairments.  He demonstrates decreased lumbar mobility as well as decreased right lower extremity strength grossly.  He is limited with bending / lifting as well as ambulation.  He is eager to increase his functional performance and should benefit from skilled physical therapy.  Thank you for the referral.      Understanding of Dx/Px/POC: good     Prognosis: good    Goals  Impairment Goals  - Decrease pain by 50% in 6 weeks  - Increase bilateral flexibility by 25% in 6 weeks  - Increase right lower extremity strength golbally to 4+/5 in 6 weeks    Functional Goals  - Return to Prior Level of Function in 6 weeks  - Patient will be independent with HEP in 6 weeks  - Patient will be able to ambulate with decreased pain in 6 weeks  - Patient will be able to squat with decreased pain in 6 weeks       Plan  Patient would benefit from: skilled physical therapy  Planned modality interventions: cryotherapy, thermotherapy: hydrocollator packs and TENS    Planned therapy interventions: home exercise program, graded exercise, functional ROM exercises, flexibility, body mechanics  training, postural training, patient education, therapeutic activities, therapeutic exercise, manual therapy, joint mobilization and neuromuscular re-education    Frequency: 1x week  Duration in weeks: 6  Treatment plan discussed with: patient        Subjective Evaluation    History of Present Illness  Mechanism of injury: HPI:  Patient notes history of back and knee pain.  He notes that his back is feeling better, but he notes that when he is getting off of the floor he can feel pain in his back.  He notes that his knee was getting better, but then ~ 1 week ago he noted that his knee pain got much worse.  He notes pain with walking and with kneeling.  He went to see Dr. Escalona where he was then referred to PT.      Pain Location:  R Knee,   Occupation:  Maintenance   Prior Functional Limitations:  Independent prior   AGG:  Walking, kneeling, getting off of the floor, stepping the wrong way   Ease:  Bed exercises, RICE    Patient Goals:  'Decrease pain and move better'     Pain  Current pain ratin  At best pain ratin  At worst pain ratin  Quality: sharp          Objective     Tenderness     Right Knee   Tenderness in the medial joint line.     Active Range of Motion   Left Knee   Flexion: 122 degrees   Extension: 0 degrees     Right Knee   Flexion: 120 degrees   Extension: 0 degrees     Additional Active Range of Motion Details  Lumbar Flexion - 50%  Lumbar Extension - 25% w/ tightness  Sidebend - 50% b/l w/ stretch  Rotation - 50% w/ stretch    Strength/Myotome Testing     Left Hip   Planes of Motion   Flexion: 5  Extension: 4-  Abduction: 3+    Right Hip   Planes of Motion   Flexion: 5  Extension: 4-  Abduction: 4-    Left Knee   Normal strength  Flexion: 4+  Extension: 4+    Right Knee   Flexion: 4-  Extension: 4-  Quadriceps contraction: fair    Muscle Activation   Patient able to activate left transverse abdominals, left multifidus, right transverse abdominals and right multifidus.                   Diagnosis:    Precautions:    POC Expires Auth Status Start Date Expiration Date PT Visit Limit    9/26 NA NA NA NA   Date 8/27 - IE       Used 1       Remaining        Manuals        PROM        Mobs                                There Ex        Bike        Hamstring Stretch        Gastroc Stretch                        Patient Education  RT - HEP Review       Neruo Re-Ed        SLR x5       Clamshell        Bridge x5       Lateral Tap Down        X-Walk        TrA Set        Multifidus Press        Leg Press                                                       Ther Act                                         Modalities             CP PRN

## 2024-08-30 ENCOUNTER — CONSULT (OUTPATIENT)
Dept: PAIN MEDICINE | Facility: CLINIC | Age: 44
End: 2024-08-30
Payer: COMMERCIAL

## 2024-08-30 VITALS — BODY MASS INDEX: 41.75 KG/M2 | WEIGHT: 315 LBS | HEIGHT: 73 IN

## 2024-08-30 DIAGNOSIS — G89.29 CHRONIC BILATERAL LOW BACK PAIN, UNSPECIFIED WHETHER SCIATICA PRESENT: ICD-10-CM

## 2024-08-30 DIAGNOSIS — M54.50 CHRONIC BILATERAL LOW BACK PAIN, UNSPECIFIED WHETHER SCIATICA PRESENT: ICD-10-CM

## 2024-08-30 DIAGNOSIS — M47.816 LUMBAR SPONDYLOSIS: Primary | ICD-10-CM

## 2024-08-30 PROCEDURE — 99204 OFFICE O/P NEW MOD 45 MIN: CPT | Performed by: ANESTHESIOLOGY

## 2024-08-30 RX ORDER — HYDROCHLOROTHIAZIDE 12.5 MG/1
12.5 CAPSULE ORAL DAILY
COMMUNITY
Start: 2024-08-16 | End: 2025-08-16

## 2024-08-30 RX ORDER — CHLORHEXIDINE GLUCONATE ORAL RINSE 1.2 MG/ML
SOLUTION DENTAL
COMMUNITY
Start: 2024-07-11

## 2024-08-30 RX ORDER — FLUTICASONE PROPIONATE 50 MCG
2 SPRAY, SUSPENSION (ML) NASAL DAILY
COMMUNITY
Start: 2024-08-26

## 2024-08-30 NOTE — PROGRESS NOTES
Assessment  1. Lumbar spondylosis    2. Chronic bilateral low back pain, unspecified whether sciatica present        Plan    Valentin Reid is a 44 y.o. male past medical history of OA right knee, Low back pain.  Patient states he has had chronic low back pain for roughly 6 years.  He states that his job does require an to do heavy lifting as well as a lot of power tool use.  Patient states most the pain is in the mid back and low back.  He states the pain is sharp in nature.  He states the pain is never been on average.  He does endorse some lateral left leg numbness which he has been evaluated for in the past and has had nerve blocks in the past with minimal relief.  States he states the pain is 9 out of 10 at this time.  He states the pain is worse with extension and states the pain gets better with TENS unit.  Patient is currently taking motrin as needed for pain relief. Denies bowel/bladder dysfunction, saddle anesthesia, fevers, chills, weight loss, night pain, or night sweats at this time. The pain interferes with function, ADLs and quality of life.    At this time we will      Continue physical therapy with stretching, strengthening (especially of core muscles), ROM exercises, HEP and modalities PRN including myofascial release, moist heat. Patient will trial conservative care if no relief will obtain MRI of the lumbar spine    Talked to the patient about bilateral L3, L4, L5 medial branch nerve blocks with intention of moving forward towards radiofrequency ablation if there is an appropriate diagnostic response. He will like to hold for now.   Patient also has Meralgia paresthetica, which can a peripheral nerve entrapment vs coming for the lumbar spine, he has had prior injections. Will retrial in the future if the patients desires.   Continue Motrin PRN.   Follow up in 5-6 weeks.     My impressions and treatment recommendations were discussed in detail with the patient who verbalized understanding and  had no further questions.  Discharge instructions were provided. I personally saw and examined the patient and I agree with the above discussed plan of care.    History of Present Illness    Valentin Reid is a 44 y.o. male past medical history of OA right knee, Low back pain.  Patient states he has had chronic low back pain for roughly 6 years.  He states that his job does require an to do heavy lifting as well as a lot of power tool use.  Patient states most the pain is in the mid back and low back.  He states the pain is sharp in nature.  He states the pain is never been on average.  He does endorse some lateral left leg numbness which he has been evaluated for in the past and has had nerve blocks in the past with minimal relief.  States he states the pain is 9 out of 10 at this time.  He states the pain is worse with extension and states the pain gets better with TENS unit.  Patient is currently taking motrin as needed for pain relief. Denies bowel/bladder dysfunction, saddle anesthesia, fevers, chills, weight loss, night pain, or night sweats at this time. The pain interferes with function, ADLs and quality of life.    I have personally reviewed and/or updated the patient's past medical history, past surgical history, family history, social history, current medications, allergies, and vital signs today.     Review of Systems   Constitutional:  Negative for fever and unexpected weight change.   HENT:  Negative for trouble swallowing.    Eyes:  Negative for visual disturbance.   Respiratory:  Negative for shortness of breath and wheezing.    Cardiovascular:  Negative for chest pain and palpitations.   Gastrointestinal:  Negative for constipation, diarrhea, nausea and vomiting.   Endocrine: Negative for cold intolerance, heat intolerance and polydipsia.   Genitourinary:  Negative for difficulty urinating and frequency.   Musculoskeletal:  Negative for arthralgias, gait problem, joint swelling and myalgias.    Skin:  Negative for rash.   Neurological:  Negative for dizziness, seizures, syncope, weakness and headaches.   Hematological:  Does not bruise/bleed easily.   Psychiatric/Behavioral:  Negative for dysphoric mood.    All other systems reviewed and are negative.      There is no problem list on file for this patient.      No past medical history on file.    No past surgical history on file.    No family history on file.    Social History     Occupational History    Not on file   Tobacco Use    Smoking status: Not on file    Smokeless tobacco: Not on file   Substance and Sexual Activity    Alcohol use: Not on file    Drug use: Not on file    Sexual activity: Not on file       Current Outpatient Medications on File Prior to Visit   Medication Sig    ibuprofen (MOTRIN) 600 mg tablet 600 mg every 6 (six) hours as needed     No current facility-administered medications on file prior to visit.       No Known Allergies    Physical Exam    There were no vitals taken for this visit.    Constitutional: normal, well developed, well nourished, alert, in no distress and non-toxic and no overt pain behavior.  Eyes: anicteric  HEENT: grossly intact  Neck: supple, symmetric, trachea midline and no masses   Pulmonary:even and unlabored  Cardiovascular:No edema or pitting edema present  Skin:Normal without rashes or lesions and well hydrated  Psychiatric:Mood and affect appropriate    Musculoskeletal    LUMBAR EXAM     APPEARANCE:  No gross deformity or malalignment     TENDERNESS:  Mild tenderness over bilateral lumbar paraspinal muscles       ROM:  Decrease A/PROM of the lumbar spine  Pain with  extension of the lumbar spine  Pain  with left side bending  Pain with right side bending    MOTOR TESTING:  Both lower extremities: Tone normal, active range of motion within functional limits with 5/5 motor power throughout.      GAIT:  Able to walk on toes  Able to walk on heels  Balance normal with ambulation     SPECIAL TESTS:  Normal  left straight leg raising test  Normal right straight leg raising test      Imaging    Study Result    Narrative & Impression         RIGHT KNEE     INDICATION:   Pain in right knee.        COMPARISON:  None.     VIEWS:  XR KNEE 3 VW RIGHT NON INJURY      FINDINGS:     There is no acute fracture or dislocation.     There is a small joint effusion.     Moderate medial compartment degenerative narrowing. Minimal varus angulation. Small osteophyte of the medial tibial plateau. Tiny superior patellar enthesophyte. Patellofemoral space is preserved.     No lytic or blastic osseous lesion.     Soft tissues are unremarkable.     IMPRESSION:        Moderate medial compartment degenerative changes as above.  Minimal varus angulation.  Minimal superior patellar enthesopathy.  Small effusion. No fracture        Electronically signed: 08/09/2024 04:18 PM Eulogio Santo MD      XR spine lumbar minimum 4 views non injury  Order: [id number]  Impression    IMPRESSION:  1.  Lower lumbar spine facet joint degenerative changes as described above            Workstation:[id number]  Narrative    STUDY: Lumbar spine, 5 views.    INDICATION: Chronic low back pain    COMPARISON: None    ACCESSION NUMBER(S): [id number]    ORDERING CLINICIAN: ALVAREZ HILL    FINDINGS:  Alignment is within normal limits.  Disc heights are maintained.  Mild to moderate facet joint arthropathy at L4-5 and L5-S1. Mild facet joint  arthropathy at L3-4.  No spondylolysis on the oblique views.  Vertebral body heights are preserved. Posterior elements are intact.  Exam End: 05/16/24  9:26 AM    Specimen Collected: 05/16/24 10:15 AM Last Resulted: 05/16/24 10:16 AM   Received From: [facility]  Result Received: 07/02/24  7:55 AM

## 2024-09-06 ENCOUNTER — OFFICE VISIT (OUTPATIENT)
Dept: PHYSICAL THERAPY | Facility: CLINIC | Age: 44
End: 2024-09-06
Payer: COMMERCIAL

## 2024-09-06 DIAGNOSIS — M17.11 PRIMARY OSTEOARTHRITIS OF RIGHT KNEE: Primary | ICD-10-CM

## 2024-09-06 DIAGNOSIS — M54.50 CHRONIC BILATERAL LOW BACK PAIN, UNSPECIFIED WHETHER SCIATICA PRESENT: ICD-10-CM

## 2024-09-06 DIAGNOSIS — G89.29 CHRONIC BILATERAL LOW BACK PAIN, UNSPECIFIED WHETHER SCIATICA PRESENT: ICD-10-CM

## 2024-09-06 PROCEDURE — 97112 NEUROMUSCULAR REEDUCATION: CPT | Performed by: PHYSICAL THERAPIST

## 2024-09-06 PROCEDURE — 97110 THERAPEUTIC EXERCISES: CPT | Performed by: PHYSICAL THERAPIST

## 2024-09-06 NOTE — PROGRESS NOTES
"Daily Note     Today's date: 2024  Patient name: Valentin Reid  : 1980  MRN: [id number]  Referring provider: Rajesh Escalona DO  Dx:   Encounter Diagnosis     ICD-10-CM    1. Primary osteoarthritis of right knee  M17.11       2. Chronic bilateral low back pain, unspecified whether sciatica present  M54.50     G89.29                      Subjective: Patient notes that his back is feeling better, but his knee has been bothering him.        Objective: See treatment diary below      Assessment: Tolerated treatment well. Patient would benefit from continued PT.  Patient did well with his first PT treatment today.  Ddi note pain when doing a lateral tap down off a 6\" step, but noted more muscular burn and less pain at 4\".  HEP was updated.  Continue to progress as able.        Plan: Progress treatment as tolerated.       Diagnosis:    Precautions:    POC Expires Auth Status Start Date Expiration Date PT Visit Limit     NA NA NA NA   Date  - IE       Used 1 2      Remaining        Manuals        PROM        Mobs                                There Ex        Bike  3 min ROM      Hamstring Stretch  20\" x 3       Gastroc Stretch        HS Curls   Pink Swiss Ball x15               Patient Education  RT - HEP Review       Neruo Re-Ed        SLR x5       Clamshell  SL Black 2x10       Bridge x5 2x10      Lateral Tap Down  6\" x 10 - pain  4\" 2x10      X-Walk        TrA Set        Multifidus Press        Leg Press  75# SL 2x10                                                      Ther Act                                         Modalities             CP PRN                                             "

## 2024-09-12 ENCOUNTER — OFFICE VISIT (OUTPATIENT)
Dept: PHYSICAL THERAPY | Facility: CLINIC | Age: 44
End: 2024-09-12
Payer: COMMERCIAL

## 2024-09-12 DIAGNOSIS — G89.29 CHRONIC BILATERAL LOW BACK PAIN, UNSPECIFIED WHETHER SCIATICA PRESENT: ICD-10-CM

## 2024-09-12 DIAGNOSIS — M17.11 PRIMARY OSTEOARTHRITIS OF RIGHT KNEE: Primary | ICD-10-CM

## 2024-09-12 DIAGNOSIS — M54.50 CHRONIC BILATERAL LOW BACK PAIN, UNSPECIFIED WHETHER SCIATICA PRESENT: ICD-10-CM

## 2024-09-12 PROCEDURE — 97110 THERAPEUTIC EXERCISES: CPT | Performed by: PHYSICAL THERAPIST

## 2024-09-12 PROCEDURE — 97112 NEUROMUSCULAR REEDUCATION: CPT | Performed by: PHYSICAL THERAPIST

## 2024-09-12 NOTE — PROGRESS NOTES
"Daily Note     Today's date: 2024  Patient name: Valentin Reid  : 1980  MRN: [id number]  Referring provider: Rajesh Escalona DO  Dx:   Encounter Diagnosis     ICD-10-CM    1. Primary osteoarthritis of right knee  M17.11       2. Chronic bilateral low back pain, unspecified whether sciatica present  M54.50     G89.29                      Subjective: Patient notes that he has been feeling good, but did note feeling it a bit just last night.        Objective: See treatment diary below      Assessment: Tolerated treatment well. Patient would benefit from continued PT.  Patient did well with PT again today.  Did note fatigue today with PREs, but no complaints of pain.  Continuing to heavily focus on quad strength as well as core activation.  Progress as able.        Plan: Progress treatment as tolerated.       Diagnosis:    Precautions:    POC Expires Auth Status Start Date Expiration Date PT Visit Limit     NA NA NA NA   Date  - IE      Used 1 2 3     Remaining        Manuals        PROM        Mobs                                There Ex        Bike  3 min ROM      Hamstring Stretch  20\" x 3  20\" x 4      Gastroc Stretch        HS Curls   Pink Swiss Ball x15  Orange Swiss Ball DL x15  SL x15             Patient Education  RT - HEP Review       Neruo Re-Ed        SLR x5       Clamshell  SL Black 2x10  SL 2x12     @ wall x10 ea      Bridge x5 2x10 X10  OSB x10      Lateral Tap Down  6\" x 10 - pain  4\" 2x10 4\" 2x10     X-Walk   Blue 2 laps      TrA Set        Multifidus Press   10# 2x10 ea      Leg Press  75# SL 2x10  80# 2x10 ea                                                     Ther Act                                         Modalities             CP PRN                                               "

## 2024-09-13 ENCOUNTER — APPOINTMENT (OUTPATIENT)
Dept: PHYSICAL THERAPY | Facility: CLINIC | Age: 44
End: 2024-09-13
Payer: COMMERCIAL

## 2024-09-20 ENCOUNTER — OFFICE VISIT (OUTPATIENT)
Dept: PHYSICAL THERAPY | Facility: CLINIC | Age: 44
End: 2024-09-20
Payer: COMMERCIAL

## 2024-09-20 DIAGNOSIS — G89.29 CHRONIC BILATERAL LOW BACK PAIN, UNSPECIFIED WHETHER SCIATICA PRESENT: ICD-10-CM

## 2024-09-20 DIAGNOSIS — M54.50 CHRONIC BILATERAL LOW BACK PAIN, UNSPECIFIED WHETHER SCIATICA PRESENT: ICD-10-CM

## 2024-09-20 DIAGNOSIS — M17.11 PRIMARY OSTEOARTHRITIS OF RIGHT KNEE: Primary | ICD-10-CM

## 2024-09-20 PROCEDURE — 97112 NEUROMUSCULAR REEDUCATION: CPT | Performed by: PHYSICAL THERAPIST

## 2024-09-20 PROCEDURE — 97110 THERAPEUTIC EXERCISES: CPT | Performed by: PHYSICAL THERAPIST

## 2024-09-20 NOTE — PROGRESS NOTES
"Daily Note     Today's date: 2024  Patient name: Valentin Reid  : 1980  MRN: [id number]  Referring provider: Rajesh Escalona DO  Dx:   Encounter Diagnosis     ICD-10-CM    1. Primary osteoarthritis of right knee  M17.11       2. Chronic bilateral low back pain, unspecified whether sciatica present  M54.50     G89.29                      Subjective: Patient notes that his back is doing OK, but notes that he can feel his knee from time to time.        Objective: See treatment diary below      Assessment: Tolerated treatment well. Patient would benefit from continued PT.  Patient did well with PT today.  No complaints of pain s/p session, but did note muscular fatigue.  Worked on WB exercises with emphasis on control on stability.  Continue to progress as able.        Plan: Progress treatment as tolerated.       Diagnosis:    Precautions:    POC Expires Auth Status Start Date Expiration Date PT Visit Limit     NA NA NA NA   Date  - IE     Used 1 2 3 4    Remaining        Manuals        PROM        Mobs                                There Ex        Bike  3 min ROM      Hamstring Stretch  20\" x 3  20\" x 4  20\" x 4    Gastroc Stretch        HS Curls   Pink Swiss Ball x15  Orange Swiss Ball DL x15  SL x15 Orange Swiss Ball DL x25            Patient Education  RT - HEP Review       Neruo Re-Ed        SLR x5       Clamshell  SL Black 2x10  SL 2x12     @ wall x10 ea  @ wall x10 ea    Bridge x5 2x10 X10  OSB x10  x20    Lateral Tap Down  6\" x 10 - pain  4\" 2x10 4\" 2x10     X-Walk   Blue 2 laps  Purple 1.5 laps    TrA Set        Multifidus Press   10# 2x10 ea      Leg Press  75# SL 2x10  80# 2x10 ea  80# SL eccentric x15    Resisted walk Outs    25# bkwd x10  30# fwd x10    SLS     Ball Drops Blue MB x10                                   Ther Act                                         Modalities             CP PRN                                                 "

## 2024-09-26 ENCOUNTER — OFFICE VISIT (OUTPATIENT)
Dept: PHYSICAL THERAPY | Facility: CLINIC | Age: 44
End: 2024-09-26
Payer: COMMERCIAL

## 2024-09-26 DIAGNOSIS — M54.50 CHRONIC BILATERAL LOW BACK PAIN, UNSPECIFIED WHETHER SCIATICA PRESENT: ICD-10-CM

## 2024-09-26 DIAGNOSIS — G89.29 CHRONIC BILATERAL LOW BACK PAIN, UNSPECIFIED WHETHER SCIATICA PRESENT: ICD-10-CM

## 2024-09-26 DIAGNOSIS — M17.11 PRIMARY OSTEOARTHRITIS OF RIGHT KNEE: Primary | ICD-10-CM

## 2024-09-26 PROCEDURE — 97112 NEUROMUSCULAR REEDUCATION: CPT

## 2024-09-26 PROCEDURE — 97110 THERAPEUTIC EXERCISES: CPT

## 2024-09-26 NOTE — PROGRESS NOTES
"Daily Note     Today's date: 2024  Patient name: Valentin Reid  : 1980  MRN: [id number]  Referring provider: Rajesh Escalona DO  Dx:   Encounter Diagnosis     ICD-10-CM    1. Primary osteoarthritis of right knee  M17.11       2. Chronic bilateral low back pain, unspecified whether sciatica present  M54.50     G89.29                      Subjective: Patient states his pain is getting better. He is wanting to be able to knee for work purposes.       Objective: See treatment diary below      Assessment: Patient did well with charted program. No reports of knee of back pain. He did well with bridges on PB but required hands at sides for some stability. Increased resistance with heaven bwd walk w/ no issue. Reviewed stretch for knees so patient is able to be in position for work and prayer, also discussed temporary modification with towel behind buttocks to relieve pressure in knees. Patient demonstrated fatigue post treatment and would benefit from continued PT      Plan: Continue per plan of care.      Diagnosis:    Precautions:    POC Expires Auth Status Start Date Expiration Date PT Visit Limit     NA NA NA NA   Date  - IE    Used 1 2 3 4 5   Remaining        Manuals        PROM        Mobs                                There Ex        Bike  3 min ROM      Hamstring Stretch  20\" x 3  20\" x 4  20\" x 4 20\"x5   Gastroc Stretch        HS Curls   Pink Swiss Ball x15  Orange Swiss Ball DL x15  SL x15 Orange Swiss Ball DL x25 Orange swiss ball DL x25           Patient Education  RT - HEP Review       Neruo Re-Ed        SLR x5       Clamshell  SL Black 2x10  SL 2x12     @ wall x10 ea  @ wall x10 ea    Bridge x5 2x10 X10  OSB x10  x20 2x10 w/ TA    Lateral Tap Down  6\" x 10 - pain  4\" 2x10 4\" 2x10     X-Walk   Blue 2 laps  Purple 1.5 laps Purple 2 laps    TrA Set        Multifidus Press   10# 2x10 ea      Leg Press  75# SL 2x10  80# 2x10 ea  80# SL eccentric x15 80# SL " eccentric x20    Resisted walk Outs    25# bkwd x10  30# fwd x10 30# bkwd x10  30# fwd x10   SLS     Ball Drops Blue MB x10                                   Ther Act                                         Modalities             CP PRN

## 2024-09-27 ENCOUNTER — APPOINTMENT (OUTPATIENT)
Dept: PHYSICAL THERAPY | Facility: CLINIC | Age: 44
End: 2024-09-27
Payer: COMMERCIAL

## 2024-10-04 ENCOUNTER — OFFICE VISIT (OUTPATIENT)
Dept: PHYSICAL THERAPY | Facility: CLINIC | Age: 44
End: 2024-10-04
Payer: COMMERCIAL

## 2024-10-04 DIAGNOSIS — M17.11 PRIMARY OSTEOARTHRITIS OF RIGHT KNEE: Primary | ICD-10-CM

## 2024-10-04 DIAGNOSIS — G89.29 CHRONIC BILATERAL LOW BACK PAIN, UNSPECIFIED WHETHER SCIATICA PRESENT: ICD-10-CM

## 2024-10-04 DIAGNOSIS — M54.50 CHRONIC BILATERAL LOW BACK PAIN, UNSPECIFIED WHETHER SCIATICA PRESENT: ICD-10-CM

## 2024-10-04 PROCEDURE — 97112 NEUROMUSCULAR REEDUCATION: CPT | Performed by: PHYSICAL THERAPIST

## 2024-10-04 NOTE — PROGRESS NOTES
"Daily Note     Today's date: 10/4/2024  Patient name: Valentin Reid  : 1980  MRN: [id number]  Referring provider: Rajesh Escalona DO  Dx:   Encounter Diagnosis     ICD-10-CM    1. Primary osteoarthritis of right knee  M17.11       2. Chronic bilateral low back pain, unspecified whether sciatica present  M54.50     G89.29                      Subjective: Patient notes that he can feel his knee from time to time      Objective: See treatment diary below      Assessment: Tolerated treatment well. Patient would benefit from continued PT.  Continuing to work on overall LE and core strength.  Able to tolerate all PREs today.  No complaints of pain during or post session.  Patient notes that his pain / discomfort can be inconsistent.  Progress as able.        Plan: Progress treatment as tolerated.       Diagnosis:    Precautions:    POC Expires Auth Status Start Date Expiration Date PT Visit Limit     NA NA NA NA   Date 10/4  9/12 9/20 9/26   Used 6  3 4 5   Remaining        Manuals        PROM        Mobs                                There Ex        Bike 5 min rOM       Hamstring Stretch 20\" x 4  20\" x 4  20\" x 4 20\"x5   Gastroc Stretch        HS Curls  Green Swiss Ball x15  Orange Swiss Ball DL x15  SL x15 Orange Swiss Ball DL x25 Orange swiss ball DL x25           Patient Education         Neruo Re-Ed        SLR 2.5# 2x10       Clamshell   SL 2x12     @ wall x10 ea  @ wall x10 ea    Bridge   X10  OSB x10  x20 2x10 w/ TA    Lateral Tap Down   4\" 2x10     X-Walk   Blue 2 laps  Purple 1.5 laps Purple 2 laps    TrA Set        Multifidus Press 13# x10 ea   13# 3 steps side-stepping x 3 ea  10# 2x10 ea      Leg Press 90# SL eccentric x 10   80# 2x10 ea  80# SL eccentric x15 80# SL eccentric x20    Resisted walk Outs    25# bkwd x10  30# fwd x10 30# bkwd x10  30# fwd x10   SLS     Ball Drops Blue MB x10                                 Ther Act                                   Modalities           CP PRN "

## 2024-10-11 ENCOUNTER — OFFICE VISIT (OUTPATIENT)
Dept: PHYSICAL THERAPY | Facility: CLINIC | Age: 44
End: 2024-10-11
Payer: COMMERCIAL

## 2024-10-11 DIAGNOSIS — M54.50 CHRONIC BILATERAL LOW BACK PAIN, UNSPECIFIED WHETHER SCIATICA PRESENT: ICD-10-CM

## 2024-10-11 DIAGNOSIS — G89.29 CHRONIC BILATERAL LOW BACK PAIN, UNSPECIFIED WHETHER SCIATICA PRESENT: ICD-10-CM

## 2024-10-11 DIAGNOSIS — M17.11 PRIMARY OSTEOARTHRITIS OF RIGHT KNEE: Primary | ICD-10-CM

## 2024-10-11 PROCEDURE — 97110 THERAPEUTIC EXERCISES: CPT | Performed by: PHYSICAL THERAPIST

## 2024-10-11 PROCEDURE — 97112 NEUROMUSCULAR REEDUCATION: CPT | Performed by: PHYSICAL THERAPIST

## 2024-10-11 NOTE — PROGRESS NOTES
PT Re-Evaluation     Today's date: 10/11/2024  Patient name: Valentin Reid  : 1980  MRN: [id number]  Referring provider: Rajesh Escalona DO  Dx:   Encounter Diagnosis     ICD-10-CM    1. Primary osteoarthritis of right knee  M17.11       2. Chronic bilateral low back pain, unspecified whether sciatica present  M54.50     G89.29                        Assessment  Impairments: abnormal muscle firing, abnormal or restricted ROM, activity intolerance, impaired physical strength, lacks appropriate home exercise program, pain with function, poor posture  and poor body mechanics    Assessment details: Valentin Reid is a 43 y.o. male who has been consistent with attending and participating in skilled PT sessions.  The patient has been able to note decreasing pain and has been able to demonstrate improved strength.  He notes that he has felt better, but over the past several weeks feels that his knee has been buckling and has had some irritation and discomfort.  He will continue to benefit from further PT sessions to decrease his pain and increase his overall function.      Understanding of Dx/Px/POC: good     Prognosis: good    Goals  Impairment Goals  - Decrease pain by 50% in 6 weeks - PARTIALLY MET   - Increase bilateral flexibility by 25% in 6 weeks - MET  - Increase right lower extremity strength golbally to 4+/5 in 6 weeks - PARTIALLY MET    Functional Goals  - Return to Prior Level of Function in 6 weeks - PARTIALLY MET   - Patient will be independent with HEP in 6 weeks - PARTIALLY MET   - Patient will be able to ambulate with decreased pain in 6 weeks - PARTIALLY MET   - Patient will be able to squat with decreased pain in 6 weeks - PARTIALLY MET       Plan  Patient would benefit from: skilled physical therapy  Planned modality interventions: cryotherapy, thermotherapy: hydrocollator packs and TENS    Planned therapy interventions: home exercise program, graded exercise, functional ROM  exercises, flexibility, body mechanics training, postural training, patient education, therapeutic activities, therapeutic exercise, manual therapy, joint mobilization and neuromuscular re-education    Frequency: 1x week  Duration in weeks: 6  Treatment plan discussed with: patient        Subjective Evaluation    History of Present Illness  Mechanism of injury: Patient notes that he has been trying to do some more walking outside of PT.  He does note that he can feel unsteady in his knee.  He feels like he can have some good days, but notes that there are some days where he can have discomfort.      HPI:  Patient notes history of back and knee pain.  He notes that his back is feeling better, but he notes that when he is getting off of the floor he can feel pain in his back.  He notes that his knee was getting better, but then ~ 1 week ago he noted that his knee pain got much worse.  He notes pain with walking and with kneeling.  He went to see Dr. Escalona where he was then referred to PT.      Pain Location:  R Knee,   Occupation:  Maintenance   Prior Functional Limitations:  Independent prior   AGG:  Walking, kneeling, getting off of the floor, stepping the wrong way   Ease:  Bed exercises, RICE    Patient Goals:  'Decrease pain and move better'     Pain  Current pain ratin  At best pain ratin  At worst pain ratin  Quality: sharp          Objective     Tenderness     Right Knee   Tenderness in the medial joint line.     Active Range of Motion   Left Knee   Flexion: 122 degrees   Extension: 0 degrees     Right Knee   Flexion: 120 degrees   Extension: 0 degrees     Additional Active Range of Motion Details  Lumbar Flexion - 50%  Lumbar Extension - 50% w/ tightness  Sidebend - 50% b/l w/ stretch  Rotation - 50% w/ stretch    Strength/Myotome Testing     Left Hip   Planes of Motion   Flexion: 5  Extension: 4  Abduction: 4-    Right Hip   Planes of Motion   Flexion: 5  Extension: 4  Abduction: 4-    Left Knee  "  Normal strength  Flexion: 4+  Extension: 4+    Right Knee   Flexion: 4-  Extension: 4  Quadriceps contraction: fair    Muscle Activation   Patient able to activate left transverse abdominals, left multifidus, right transverse abdominals and right multifidus.                  Diagnosis:    Precautions:    POC Expires Auth Status Start Date Expiration Date PT Visit Limit    9/26 NA NA NA NA   Date 10/4 10/11  9/20 9/26   Used 6 7  4 5   Remaining        Manuals        PROM        Mobs                                There Ex        Bike 5 min rOM       Hamstring Stretch 20\" x 4 20\" x 4  20\" x 4 20\"x5   Gastroc Stretch        HS Curls  Green Swiss Ball x15 Orange Swiss Ball x20  Orange Swiss Ball DL x25 Orange swiss ball DL x25           Patient Education   RT - RE       Neruo Re-Ed        SLR 2.5# 2x10 3# 2x10      Clamshell    @ wall x10 ea    Bridge    x20 2x10 w/ TA    Lateral Tap Down        X-Walk    Purple 1.5 laps Purple 2 laps    TrA Set        Multifidus Press 13# x10 ea   13# 3 steps side-stepping x 3 ea       Leg Press 90# SL eccentric x 10    80# SL eccentric x15 80# SL eccentric x20    Resisted walk Outs  30# bkwd x10  30# fwd x5  25# bkwd x10  30# fwd x10 30# bkwd x10  30# fwd x10   SLS   Blue Med Ball Drops x10  Ball Drops Blue MB x10                                Ther Act                                Modalities          CP PRN                                "

## 2024-10-14 ENCOUNTER — OFFICE VISIT (OUTPATIENT)
Dept: PAIN MEDICINE | Facility: CLINIC | Age: 44
End: 2024-10-14
Payer: COMMERCIAL

## 2024-10-14 VITALS
HEIGHT: 73 IN | HEART RATE: 98 BPM | SYSTOLIC BLOOD PRESSURE: 140 MMHG | BODY MASS INDEX: 41.75 KG/M2 | DIASTOLIC BLOOD PRESSURE: 87 MMHG | WEIGHT: 315 LBS

## 2024-10-14 DIAGNOSIS — M54.50 CHRONIC BILATERAL LOW BACK PAIN WITHOUT SCIATICA: Primary | ICD-10-CM

## 2024-10-14 DIAGNOSIS — M17.11 OSTEOARTHRITIS OF RIGHT KNEE, UNSPECIFIED OSTEOARTHRITIS TYPE: ICD-10-CM

## 2024-10-14 DIAGNOSIS — G89.29 CHRONIC BILATERAL LOW BACK PAIN WITHOUT SCIATICA: Primary | ICD-10-CM

## 2024-10-14 PROCEDURE — 99214 OFFICE O/P EST MOD 30 MIN: CPT | Performed by: NURSE PRACTITIONER

## 2024-10-14 RX ORDER — MELOXICAM 15 MG/1
15 TABLET ORAL DAILY PRN
Qty: 30 TABLET | Refills: 1 | Status: SHIPPED | OUTPATIENT
Start: 2024-10-14

## 2024-10-14 NOTE — PROGRESS NOTES
Assessment:  1. Chronic bilateral low back pain without sciatica    2. Osteoarthritis of right knee, unspecified osteoarthritis type        Plan:  I will order an MRI of the lumbar spine without contrast  I will discontinue ibuprofen and trial meloxicam 15 mg daily as needed pain.  He was advised to avoid other NSAIDs while this medication and to take the medication with food to avoid GI upset  Patient was scheduled for a right intra-articular knee injection under ultrasound guidance.  Continue with physical therapy and home exercise program  Again discussed lumbar medial branch blocks for his lumbar complaints.  Patient does not feel the back pain warrants this procedure at this time.  He was provided with informational brochures  Continue to follow with orthopedics as scheduled  Follow-up after injection or sooner if needed    History of Present Illness:    The patient is a 44 y.o. male with a history of OA of the right knee last seen on 08/30/2024  who presents for a follow up office visit in regards to chronicaxial low back pain, right greater than left that is nonradiating into the lower extremities.  He denies bowel or bladder incontinence or saddle anesthesia.  Patient also complains of right knee pain.  X-ray of the right knee does reveal moderate osteoarthritis.  X-ray of the lumbar spine also reveals mild to moderate lumbar facet arthrosis from L3-4 through L5-S1.  He has been completing physical therapy for both lumbar and knee complaints with some improvement in his pain.    The patient rates his pain a 6 out of 10 on the numeric pain rating scale.  Pain is occasional throughout the day and is described as dull aching    I have personally reviewed and/or updated the patient's past medical history, past surgical history, family history, social history, current medications, allergies, and vital signs today.       Review of Systems:    Review of Systems   Respiratory:  Negative for shortness of breath.   "  Cardiovascular:  Negative for chest pain.   Gastrointestinal:  Negative for constipation, diarrhea, nausea and vomiting.   Musculoskeletal:  Positive for back pain and gait problem. Negative for arthralgias, joint swelling and myalgias.   Skin:  Negative for rash.   Neurological:  Negative for dizziness, seizures and weakness.   All other systems reviewed and are negative.        History reviewed. No pertinent past medical history.    History reviewed. No pertinent surgical history.    History reviewed. No pertinent family history.    Social History     Occupational History    Not on file   Tobacco Use    Smoking status: Never    Smokeless tobacco: Never   Substance and Sexual Activity    Alcohol use: Never    Drug use: Never    Sexual activity: Not on file         Current Outpatient Medications:     chlorhexidine (PERIDEX) 0.12 % solution, , Disp: , Rfl:     fluticasone (FLONASE) 50 mcg/act nasal spray, 2 sprays into each nostril daily, Disp: , Rfl:     hydroCHLOROthiazide 12.5 mg capsule, Take 12.5 mg by mouth daily, Disp: , Rfl:     meloxicam (MOBIC) 15 mg tablet, Take 1 tablet (15 mg total) by mouth daily as needed for moderate pain, Disp: 30 tablet, Rfl: 1    Allergies   Allergen Reactions    Pollen Extract Cough, Eye Swelling, Fever, Headache, Nasal Congestion and Sneezing    Shellfish Allergy - Food Allergy Hives, Itching and Rash       Physical Exam:    /87   Pulse 98   Ht 6' 1\" (1.854 m)   Wt (!) 161 kg (356 lb)   BMI 46.97 kg/m²     Constitutional:normal, well developed, well nourished, alert, in no distress and non-toxic and no overt pain behavior.  Eyes:anicteric  HEENT:grossly intact  Neck:supple, symmetric, trachea midline and no masses   Pulmonary:even and unlabored  Cardiovascular:No edema or pitting edema present  Skin:Normal without rashes or lesions and well hydrated  Psychiatric:Mood and affect appropriate  Neurologic:Cranial Nerves II-XII grossly intact  Musculoskeletal:normal gait.  " Bilateral lower extremity strength 5 out of 5 in all muscle groups.      Imaging  MRI lumbar spine wo contrast    (Results Pending)   RIGHT KNEE     INDICATION:   Pain in right knee.        COMPARISON:  None.     VIEWS:  XR KNEE 3 VW RIGHT NON INJURY      FINDINGS:     There is no acute fracture or dislocation.     There is a small joint effusion.     Moderate medial compartment degenerative narrowing. Minimal varus angulation. Small osteophyte of the medial tibial plateau. Tiny superior patellar enthesophyte. Patellofemoral space is preserved.     No lytic or blastic osseous lesion.     Soft tissues are unremarkable.     IMPRESSION:        Moderate medial compartment degenerative changes as above.  Minimal varus angulation.  Minimal superior patellar enthesopathy.  Small effusion. No fracture        IMPRESSION:  1.  Lower lumbar spine facet joint degenerative changes as described above            Workstation:[id number]  Narrative  STUDY: Lumbar spine, 5 views.    INDICATION: Chronic low back pain    COMPARISON: None    ACCESSION NUMBER(S): [id number]    ORDERING CLINICIAN: ALVAREZ HILL    FINDINGS:  Alignment is within normal limits.  Disc heights are maintained.  Mild to moderate facet joint arthropathy at L4-5 and L5-S1. Mild facet joint  arthropathy at L3-4.  No spondylolysis on the oblique views.  Vertebral body heights are preserved. Posterior elements are intact.  Orders Placed This Encounter   Procedures    MRI lumbar spine wo contrast

## 2024-10-18 ENCOUNTER — OFFICE VISIT (OUTPATIENT)
Dept: PHYSICAL THERAPY | Facility: CLINIC | Age: 44
End: 2024-10-18
Payer: COMMERCIAL

## 2024-10-18 DIAGNOSIS — M17.11 PRIMARY OSTEOARTHRITIS OF RIGHT KNEE: Primary | ICD-10-CM

## 2024-10-18 DIAGNOSIS — M54.50 CHRONIC BILATERAL LOW BACK PAIN, UNSPECIFIED WHETHER SCIATICA PRESENT: ICD-10-CM

## 2024-10-18 DIAGNOSIS — G89.29 CHRONIC BILATERAL LOW BACK PAIN, UNSPECIFIED WHETHER SCIATICA PRESENT: ICD-10-CM

## 2024-10-18 PROCEDURE — 97112 NEUROMUSCULAR REEDUCATION: CPT | Performed by: PHYSICAL THERAPIST

## 2024-10-18 PROCEDURE — 97110 THERAPEUTIC EXERCISES: CPT | Performed by: PHYSICAL THERAPIST

## 2024-10-18 NOTE — PROGRESS NOTES
"Daily Note     Today's date: 10/18/2024  Patient name: Valentin Reid  : 1980  MRN: [id number]  Referring provider: Rajesh Escalona DO  Dx:   Encounter Diagnosis     ICD-10-CM    1. Primary osteoarthritis of right knee  M17.11       2. Chronic bilateral low back pain, unspecified whether sciatica present  M54.50     G89.29                      Subjective: Patient notes that his knee was doing OK this week.        Objective: See treatment diary below      Assessment: Tolerated treatment well. Patient would benefit from continued PT.  Patient doing well.  Notes that he saw spine and pain earlier in the week and will be seeing Dr. Escalona for follow-up in about 2 weeks.  Continuing to work on core and LE strength.  Able to tolerate all progressions today.  Continue to progress as able.        Plan: Progress treatment as tolerated.       Diagnosis:    Precautions:    POC Expires Auth Status Start Date Expiration Date PT Visit Limit     NA NA NA NA   Date 10/4 10/11 10/18  9/26   Used 6 7 8  5   Remaining        Manuals        PROM        Mobs                                There Ex        Bike 5 min rOM       Hamstring Stretch 20\" x 4 20\" x 4 20\" x 4  20\"x5   Gastroc Stretch   20\" x 4     HS Curls  Green Swiss Ball x15 Orange Swiss Ball x20   Orange swiss ball DL x25           Patient Education   RT - RE       Neruo Re-Ed        SLR 2.5# 2x10 3# 2x10 3# 2x12     Clamshell        Bridge   X10  OSB x10   2x10 w/ TA    Lateral Tap Down        X-Walk     Purple 2 laps    TrA Set        Multifidus Press 13# x10 ea   13# 3 steps side-stepping x 3 ea  18# punches x10 ea    18# hold w/ side-step 3x ea    Big-stick walkouts 17# x5 ea     Leg Press 90# SL eccentric x 10   100# eccentrics 2 up 1 down x10  80# SL eccentric x20    Resisted walk Outs  30# bkwd x10  30# fwd x5   30# bkwd x10  30# fwd x10   SLS   Blue Med Ball Drops x10 Excursions x10 ea lateral                                Ther Act                 "             Modalities         CP PRN

## 2024-10-25 ENCOUNTER — OFFICE VISIT (OUTPATIENT)
Dept: PHYSICAL THERAPY | Facility: CLINIC | Age: 44
End: 2024-10-25
Payer: COMMERCIAL

## 2024-10-25 DIAGNOSIS — G89.29 CHRONIC BILATERAL LOW BACK PAIN, UNSPECIFIED WHETHER SCIATICA PRESENT: ICD-10-CM

## 2024-10-25 DIAGNOSIS — M17.11 PRIMARY OSTEOARTHRITIS OF RIGHT KNEE: Primary | ICD-10-CM

## 2024-10-25 DIAGNOSIS — M54.50 CHRONIC BILATERAL LOW BACK PAIN, UNSPECIFIED WHETHER SCIATICA PRESENT: ICD-10-CM

## 2024-10-25 PROCEDURE — 97112 NEUROMUSCULAR REEDUCATION: CPT | Performed by: PHYSICAL THERAPIST

## 2024-10-25 NOTE — PROGRESS NOTES
"Daily Note     Today's date: 10/25/2024  Patient name: Valentin Reid  : 1980  MRN: [id number]  Referring provider: Rajesh Escalona DO  Dx:   Encounter Diagnosis     ICD-10-CM    1. Primary osteoarthritis of right knee  M17.11       2. Chronic bilateral low back pain, unspecified whether sciatica present  M54.50     G89.29                      Subjective: Patient notes that he has been feeling OK.        Objective: See treatment diary below      Assessment: Tolerated treatment well. Patient would benefit from continued PT.  Patient doing well overall.  Only noted one instance this week of having knee discomfort.  Continuing to work on overall strength.  Patient did note fatigue s/p session.  Progress as able.        Plan: Progress treatment as tolerated.       Diagnosis:    Precautions:    POC Expires Auth Status Start Date Expiration Date PT Visit Limit     NA NA NA NA   Date 10/4 10/11 10/18 10/25    Used 6 7 8 9    Remaining        Manuals        PROM        Mobs                                There Ex        Bike 5 min rOM       Hamstring Stretch 20\" x 4 20\" x 4 20\" x 4 20\" x 4    Gastroc Stretch   20\" x 4 20\" x 4    HS Curls  Green Swiss Ball x15 Orange Swiss Ball x20  Orange Swiss Ball x20             Patient Education   RT - RE       Neruo Re-Ed        SLR 2.5# 2x10 3# 2x10 3# 2x12 3# 2x10    Clamshell        Bridge   X10  OSB x10  OSB 2x10    Lateral Tap Down        X-Walk        TrA Set        Multifidus Press 13# x10 ea   13# 3 steps side-stepping x 3 ea  18# punches x10 ea    18# hold w/ side-step 3x ea    Big-stick walkouts 17# x5 ea 18# punches x12 ea    18# hold w/ side-step 5 ea    Big stick walkouts 17# x5 ea    Leg Press 90# SL eccentric x 10   100# eccentrics 2 up 1 down x10 100# eccentris 2 up 1 down 2x10     Resisted walk Outs  30# bkwd x10  30# fwd x5      SLS   Blue Med Ball Drops x10 Excursions x10 ea lateral                               Ther Act                        "   Modalities        CP PRN

## 2024-11-01 ENCOUNTER — OFFICE VISIT (OUTPATIENT)
Dept: PHYSICAL THERAPY | Facility: CLINIC | Age: 44
End: 2024-11-01
Payer: COMMERCIAL

## 2024-11-01 DIAGNOSIS — M17.11 PRIMARY OSTEOARTHRITIS OF RIGHT KNEE: Primary | ICD-10-CM

## 2024-11-01 DIAGNOSIS — G89.29 CHRONIC BILATERAL LOW BACK PAIN, UNSPECIFIED WHETHER SCIATICA PRESENT: ICD-10-CM

## 2024-11-01 DIAGNOSIS — M54.50 CHRONIC BILATERAL LOW BACK PAIN, UNSPECIFIED WHETHER SCIATICA PRESENT: ICD-10-CM

## 2024-11-01 PROCEDURE — 97110 THERAPEUTIC EXERCISES: CPT | Performed by: PHYSICAL THERAPIST

## 2024-11-01 PROCEDURE — 97112 NEUROMUSCULAR REEDUCATION: CPT | Performed by: PHYSICAL THERAPIST

## 2024-11-01 NOTE — PROGRESS NOTES
"Daily Note     Today's date: 2024  Patient name: Valentin Reid  : 1980  MRN: [id number]  Referring provider: Rajesh Escalona DO  Dx:   Encounter Diagnosis     ICD-10-CM    1. Primary osteoarthritis of right knee  M17.11       2. Chronic bilateral low back pain, unspecified whether sciatica present  M54.50     G89.29                      Subjective: Patient notes that he is feeling OK.  He notes that he can feel discomfort from time to time.        Objective: See treatment diary below      Assessment: Tolerated treatment well. Patient would benefit from continued PT.  Patient is doing well overall.  No complaints of pain duirng the session, but can note slight twinges with trying to fight any twisting.  Patient is to see Dr. Escalona on Monday.  Progress as able.        Plan: Progress treatment as tolerated.       Diagnosis:    Precautions:    POC Expires Auth Status Start Date Expiration Date PT Visit Limit     NA NA NA NA   Date 10/4 10/11 10/18 10/25 11/1   Used 6 7 8 9 10   Remaining        Manuals        PROM        Mobs                                There Ex        Bike 5 min rOM       Hamstring Stretch 20\" x 4 20\" x 4 20\" x 4 20\" x 4 20\" x  4   Gastroc Stretch   20\" x 4 20\" x 4 20\" x 4    HS Curls  Green Swiss Ball x15 Orange Swiss Ball x20  Orange Swiss Ball x20  Orange Swiss Ball x20           Patient Education   RT - RE       Neruo Re-Ed        SLR 2.5# 2x10 3# 2x10 3# 2x12 3# 2x10 3# 2x10    Clamshell        Bridge   X10  OSB x10  OSB 2x10 OSB 2x12   Lateral Tap Down        X-Walk        TrA Set        Multifidus Press 13# x10 ea   13# 3 steps side-stepping x 3 ea  18# punches x10 ea    18# hold w/ side-step 3x ea    Big-stick walkouts 17# x5 ea 18# punches x12 ea    18# hold w/ side-step 5 ea    Big stick walkouts 17# x5 ea 19# x12 ea punches    19# hold w/ side-step 5x ea     Big stick walkoiuts 19# x10 ea    Leg Press 90# SL eccentric x 10   100# eccentrics 2 up 1 down x10 100# " eccentris 2 up 1 down 2x10  105# eccentrics 2 up 1 down 2x10    Resisted walk Outs  30# bkwd x10  30# fwd x5      SLS   Blue Med Ball Drops x10 Excursions x10 ea lateral                               Ther Act                          Modalities        CP PRN

## 2024-11-04 ENCOUNTER — OFFICE VISIT (OUTPATIENT)
Dept: OBGYN CLINIC | Facility: CLINIC | Age: 44
End: 2024-11-04
Payer: COMMERCIAL

## 2024-11-04 VITALS — BODY MASS INDEX: 41.75 KG/M2 | HEIGHT: 73 IN | WEIGHT: 315 LBS

## 2024-11-04 DIAGNOSIS — M17.11 PRIMARY OSTEOARTHRITIS OF RIGHT KNEE: Primary | ICD-10-CM

## 2024-11-04 PROCEDURE — 99213 OFFICE O/P EST LOW 20 MIN: CPT | Performed by: STUDENT IN AN ORGANIZED HEALTH CARE EDUCATION/TRAINING PROGRAM

## 2024-11-04 PROCEDURE — 20610 DRAIN/INJ JOINT/BURSA W/O US: CPT

## 2024-11-04 RX ORDER — TRIAMCINOLONE ACETONIDE 40 MG/ML
40 INJECTION, SUSPENSION INTRA-ARTICULAR; INTRAMUSCULAR
Status: COMPLETED | OUTPATIENT
Start: 2024-11-04 | End: 2024-11-04

## 2024-11-04 RX ORDER — BUPIVACAINE HYDROCHLORIDE 2.5 MG/ML
4 INJECTION, SOLUTION INFILTRATION; PERINEURAL
Status: COMPLETED | OUTPATIENT
Start: 2024-11-04 | End: 2024-11-04

## 2024-11-04 RX ADMIN — TRIAMCINOLONE ACETONIDE 40 MG: 40 INJECTION, SUSPENSION INTRA-ARTICULAR; INTRAMUSCULAR at 13:00

## 2024-11-04 RX ADMIN — BUPIVACAINE HYDROCHLORIDE 4 ML: 2.5 INJECTION, SOLUTION INFILTRATION; PERINEURAL at 13:00

## 2024-11-04 NOTE — LETTER
November 4, 2024     Patient: Valentin Reid  YOB: 1980  Date of Visit: 11/4/2024      To Whom it May Concern:    Valentin Reid is under my professional care. Valentin was seen in my office on 11/4/2024. Valentin should remain out of work for evening of 11/4/24 into 11/5/24 due to same day procedures.    If you have any questions or concerns, please don't hesitate to call.         Sincerely,          Rajesh Escalona, DO

## 2024-11-04 NOTE — PROGRESS NOTES
Ortho Sports Medicine Knee New Patient Visit     Assesment:   44 y.o. male right knee osteoarthritis with previous history of meniscus tear    Plan:  The patient's diagnosis and treatment were discussed at length today. We discussed no treatment, non-operative treatment, and operative treatment.    Valentin presents today for follow up right knee osteoarthritis with previous history of meniscus tear.  I discussed with him he can continue to treat this with non-operative treatment with a combination of activity modification, physical therapy, bracing, and cortisone injection.  Patient was given a right knee corticosteroid injection without complication and was well tolerated, discussed that he can have some dull achy soreness, a work note was provided to allow him time off tonight as needed for discomfort, discussed that if he does not have pain relief he can return or message us in Mychart in 4-6 weeks and we can possibly obtain an MRI. Patient is to follow up in 3 months or as needed.     Conservative treatment:    Ice to knee for 20 minutes at least 1-2 times daily.  PT for ROM/strengthening to knee, hip and core.  OTC NSAIDS prn for pain.  Let pain guide gradual return activities.      Imaging:    No imaging      Injection:    Risk and benefits discussed  Right knee CSI was given without complication and well tolerated      Surgery:     No surgery is recommended at this point, continue with conservative treatment plan as noted.      Follow up:    No follow-ups on file.        Chief Complaint   Patient presents with    Right Knee - Follow-up     Still feels a little unstable        History of Present Illness:    The patient is a 44 y.o. male whose occupation is a , referred to me by themself, seen in clinic for evaluation of right knee pain.  He states he has been having ongoing right knee pain now for approximately 4 to 5 months without any recent injury or trauma.  He states that he does have a prior  injury approximately 5 years ago when he was going down and felt medial knee pain.  He states that he did have an MRI obtained in [place] that demonstrated meniscus tear that was treated nonsurgically.  He states recently has been having anterior medial knee pain which she describes as dull and achy that is worse with deep squatting, prolonged walking, kneeling, and going up and down steps.  He denies any instability.  He denies a mechanical symptoms or catching or locking.  He has not attempted any knee cortisone injections, however he recently had a elbow cortisone injection which he states he feels like his knee pain improved after this injection we will.  He denies any numbness or tingling.    Update 11/4/24;  Patient states that he is having right knee pain rates it to be a5/10 dull achy pain that is anterior medial, states that he has been doing physical therapy and home exercises. Notices that when he tries to work out and do work that his knees become more painful, right is worse than his left. Denies any new injury and denies any numbness or tingling, he does not experience and locking or catching nor instability of the right knee.   Knee Surgical History:  None    Past Medical, Social and Family History:  History reviewed. No pertinent past medical history.  History reviewed. No pertinent surgical history.  Allergies   Allergen Reactions    Pollen Extract Cough, Eye Swelling, Fever, Headache, Nasal Congestion and Sneezing    Shellfish Allergy - Food Allergy Hives, Itching and Rash     Current Outpatient Medications on File Prior to Visit   Medication Sig Dispense Refill    chlorhexidine (PERIDEX) 0.12 % solution       fluticasone (FLONASE) 50 mcg/act nasal spray 2 sprays into each nostril daily      hydroCHLOROthiazide 12.5 mg capsule Take 12.5 mg by mouth daily      meloxicam (MOBIC) 15 mg tablet Take 1 tablet (15 mg total) by mouth daily as needed for moderate pain 30 tablet 1     No current  facility-administered medications on file prior to visit.     Social History     Socioeconomic History    Marital status: /Civil Union     Spouse name: Not on file    Number of children: Not on file    Years of education: Not on file    Highest education level: Not on file   Occupational History    Not on file   Tobacco Use    Smoking status: Never    Smokeless tobacco: Never   Substance and Sexual Activity    Alcohol use: Never    Drug use: Never    Sexual activity: Not on file   Other Topics Concern    Not on file   Social History Narrative    Not on file     Social Determinants of Health     Financial Resource Strain: Low Risk  (11/8/2023)    Received from [facility]    Overall Financial Resource Strain (CARDIA)     Difficulty of Paying Living Expenses: Not very hard   Food Insecurity: No Food Insecurity (11/8/2023)    Received from [facility]    Hunger Vital Sign     Worried About Running Out of Food in the Last Year: Never true     Ran Out of Food in the Last Year: Never true   Transportation Needs: No Transportation Needs (11/8/2023)    Received from [facility]    PRAPARE - Transportation     Lack of Transportation (Medical): No     Lack of Transportation (Non-Medical): No   Physical Activity: Not on file   Stress: No Stress Concern Present (11/8/2023)    Received from [facility]    [employer] [employer] - Occupational Stress Questionnaire     Feeling of Stress : Only a little   Social Connections: Moderately Integrated (11/8/2023)    Received from [facility]    Social Connection and Isolation Panel [NHANES]     Frequency of Communication with Friends and Family: More than three times a week     Frequency of Social Gatherings with Friends and Family: Never     Attends [employer] Services: More than 4 times per year     Active Member of Clubs or Organizations: No     Attends Club or Organization  "Meetings: Never     Marital Status:    Intimate Partner Violence: Not At Risk (11/8/2023)    Received from [facility]    Humiliation, Afraid, Rape, and Kick questionnaire     Fear of Current or Ex-Partner: No     Emotionally Abused: No     Physically Abused: No     Sexually Abused: No   Housing Stability: Not on file         I have reviewed the past medical, surgical, social and family history, medications and allergies as documented in the EMR.    Review of systems: ROS is negative other than that noted in the HPI.  Constitutional: Negative for fatigue and fever.   HENT: Negative for sore throat.    Respiratory: Negative for shortness of breath.    Cardiovascular: Negative for chest pain.   Gastrointestinal: Negative for abdominal pain.   Endocrine: Negative for cold intolerance and heat intolerance.   Genitourinary: Negative for flank pain.   Musculoskeletal: Negative for back pain.   Skin: Negative for rash.   Allergic/Immunologic: Negative for immunocompromised state.   Neurological: Negative for dizziness.   Psychiatric/Behavioral: Negative for agitation.      Physical Exam:    Height 6' 1\" (1.854 m), weight (!) 161 kg (356 lb).    General/Constitutional: NAD, well developed, well nourished  HENT: Normocephalic, atraumatic  CV: Intact distal pulses, regular rate  Resp: No respiratory distress or labored breathing  Lymphatic: No lymphadenopathy palpated  Neuro: Alert and Oriented x 3, no focal deficits  Psych: Normal mood, normal affect, normal judgement, normal behavior  Skin: Warm, dry, no rashes, no erythema      Knee Exam (focused):  Visual inspection of the right knee demonstrates normal contour without atrophy.   No previous incisions   There is no significant erythema or edema.    No significant joint effusion   Range of motion is full from 0-130 degrees of flexion   Able to straight leg raise   Mild patellar tender to palpation  Positive medial joint line tenderness, no lateral " "joint line tenderness  Negative medial Dandre's, negative lateral Dandre's  1 Lachman exam, stable posterior drawer  Negative dial test  Stable to varus and valgus stress at both 0 and 30°  Patella tracks normally.  No J sign.  No apprehension.  Translation is approximately 2 quadrants and is equal to the contralateral side  Patellar eversion is similar to the contralateral side    Examination of the patient's ipsilateral hip demonstrates full painless range of motion.  No crepitus.      LE NV Exam: +2 DP/PT pulses bilaterally  Sensation intact to light touch L2-S1 bilaterally     Bilateral hip ROM demonstrates no pain actively or passively    No calf tenderness to palpation bilaterally    Large joint arthrocentesis: R knee  Universal Protocol:  Consent: Verbal consent obtained. Written consent not obtained.  Risks and benefits: risks, benefits and alternatives were discussed  Consent given by: patient  Time out: Immediately prior to procedure a \"time out\" was called to verify the correct patient, procedure, equipment, support staff and site/side marked as required.  Timeout called at: 11/4/2024 1:53 PM.  Patient understanding: patient states understanding of the procedure being performed  Relevant documents: relevant documents present and verified  Test results: test results available and properly labeled  Site marked: the operative site was marked  Radiology Images displayed and confirmed. If images not available, report reviewed: imaging studies available  Patient identity confirmed: verbally with patient, provided demographic data and hospital-assigned identification number  Supporting Documentation  Indications: pain and joint swelling   Procedure Details  Location: knee - R knee  Preparation: Patient was prepped and draped in the usual sterile fashion  Needle size: 18 G  Ultrasound guidance: no  Approach: anterolateral  Medications administered: 4 mL bupivacaine 0.25 %; 40 mg triamcinolone acetonide 40 " mg/mL    Patient tolerance: patient tolerated the procedure well with no immediate complications  Dressing:  Sterile dressing applied           Scribe Attestation      I,:  Vivian Carreon PA-C am acting as a scribe while in the presence of the attending physician.:       I,:  Vivian Carreon PA-C personally performed the services described in this documentation    as scribed in my presence.:

## 2024-11-08 ENCOUNTER — OFFICE VISIT (OUTPATIENT)
Dept: PHYSICAL THERAPY | Facility: CLINIC | Age: 44
End: 2024-11-08
Payer: COMMERCIAL

## 2024-11-08 DIAGNOSIS — M17.11 PRIMARY OSTEOARTHRITIS OF RIGHT KNEE: Primary | ICD-10-CM

## 2024-11-08 DIAGNOSIS — G89.29 CHRONIC BILATERAL LOW BACK PAIN, UNSPECIFIED WHETHER SCIATICA PRESENT: ICD-10-CM

## 2024-11-08 DIAGNOSIS — M54.50 CHRONIC BILATERAL LOW BACK PAIN, UNSPECIFIED WHETHER SCIATICA PRESENT: ICD-10-CM

## 2024-11-08 PROCEDURE — 97110 THERAPEUTIC EXERCISES: CPT | Performed by: PHYSICAL THERAPIST

## 2024-11-08 PROCEDURE — 97112 NEUROMUSCULAR REEDUCATION: CPT | Performed by: PHYSICAL THERAPIST

## 2024-11-08 NOTE — PROGRESS NOTES
"Discharge     Today's date: 2024  Patient name: Valentin Reid  : 1980  MRN: [id number]  Referring provider: Rajesh Escalona DO  Dx:   Encounter Diagnosis     ICD-10-CM    1. Primary osteoarthritis of right knee  M17.11       2. Chronic bilateral low back pain, unspecified whether sciatica present  M54.50     G89.29                      Subjective: Patient notes that he is happy with how his knee.        Objective: See treatment diary below      Assessment: Tolerated treatment well. Patient has done very well overall.  He does note that he had an injection in his knee and feels that this has helped as well.  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  If Valentin were to need PT in the future, we would be happy to share in his care.        Plan: Discharge to [facility].       Diagnosis:    Precautions:    POC Expires Auth Status Start Date Expiration Date PT Visit Limit     NA NA NA NA   Date 11/8  10/18 10/25 11/1   Used   8 9 10   Remaining        Manuals        PROM        Mobs                                There Ex        Bike        Hamstring Stretch 20\" x 4  20\" x 4 20\" x 4 20\" x  4   Gastroc Stretch 20\" x 4  20\" x 4 20\" x 4 20\" x 4    HS Curls  Orange Swiss Ball DL x15, SL x 15   Orange Swiss Ball x20  Orange Swiss Ball x20           Patient Education  RT - DC       Neruo Re-Ed        SLR 3# 2x10  3# 2x12 3# 2x10 3# 2x10    Clamshell        Bridge   X10  OSB x10  OSB 2x10 OSB 2x12   Lateral Tap Down        X-Walk        TrA Set        Multifidus Press 19# x12 ea punches    19# hold w/ side-step 5x ea     Big stick walkoiuts 19# x10 ea   18# punches x10 ea    18# hold w/ side-step 3x ea    Big-stick walkouts 17# x5 ea 18# punches x12 ea    18# hold w/ side-step 5 ea    Big stick walkouts 17# x5 ea 19# x12 ea punches    19# hold w/ side-step 5x ea     Big stick walkoiuts 19# x10 ea    Leg Press 105# eccentrics 2 up 1 down 2x10   100# eccentrics 2 up 1 down x10 " 100# eccentris 2 up 1 down 2x10  105# eccentrics 2 up 1 down 2x10    Resisted walk Outs        SLS    Excursions x10 ea lateral                               Ther Act                          Modalities        CP PRN

## 2024-11-15 ENCOUNTER — APPOINTMENT (OUTPATIENT)
Dept: PHYSICAL THERAPY | Facility: CLINIC | Age: 44
End: 2024-11-15
Payer: COMMERCIAL

## 2024-11-22 ENCOUNTER — APPOINTMENT (OUTPATIENT)
Dept: PHYSICAL THERAPY | Facility: CLINIC | Age: 44
End: 2024-11-22
Payer: COMMERCIAL

## 2024-12-10 ENCOUNTER — OFFICE VISIT (OUTPATIENT)
Dept: BARIATRICS | Facility: CLINIC | Age: 44
End: 2024-12-10
Payer: COMMERCIAL

## 2024-12-10 VITALS
SYSTOLIC BLOOD PRESSURE: 136 MMHG | WEIGHT: 315 LBS | TEMPERATURE: 96.9 F | HEART RATE: 86 BPM | BODY MASS INDEX: 42.66 KG/M2 | DIASTOLIC BLOOD PRESSURE: 97 MMHG | HEIGHT: 72 IN | RESPIRATION RATE: 17 BRPM

## 2024-12-10 DIAGNOSIS — E66.813 CLASS 3 SEVERE OBESITY DUE TO EXCESS CALORIES WITH SERIOUS COMORBIDITY AND BODY MASS INDEX (BMI) OF 45.0 TO 49.9 IN ADULT (HCC): Primary | ICD-10-CM

## 2024-12-10 DIAGNOSIS — I10 PRIMARY HYPERTENSION: ICD-10-CM

## 2024-12-10 DIAGNOSIS — G47.33 MODERATE OBSTRUCTIVE SLEEP APNEA: ICD-10-CM

## 2024-12-10 DIAGNOSIS — E66.01 CLASS 3 SEVERE OBESITY DUE TO EXCESS CALORIES WITH SERIOUS COMORBIDITY AND BODY MASS INDEX (BMI) OF 45.0 TO 49.9 IN ADULT (HCC): Primary | ICD-10-CM

## 2024-12-10 DIAGNOSIS — R73.03 PREDIABETES: ICD-10-CM

## 2024-12-10 PROBLEM — E66.9 OBESITY: Status: ACTIVE | Noted: 2021-11-16

## 2024-12-10 PROBLEM — R00.2 PALPITATIONS: Status: ACTIVE | Noted: 2024-05-14

## 2024-12-10 PROBLEM — F41.0 PANIC ATTACK: Status: ACTIVE | Noted: 2024-05-14

## 2024-12-10 PROCEDURE — 99204 OFFICE O/P NEW MOD 45 MIN: CPT | Performed by: PHYSICIAN ASSISTANT

## 2024-12-10 RX ORDER — TIRZEPATIDE 2.5 MG/.5ML
2.5 INJECTION, SOLUTION SUBCUTANEOUS WEEKLY
Qty: 2 ML | Refills: 0 | Status: SHIPPED | OUTPATIENT
Start: 2024-12-10 | End: 2025-01-07

## 2024-12-10 NOTE — PROGRESS NOTES
Assessment/Plan:    Primary hypertension  On hctz.   -should improve with weight loss, dietary, and lifestyle changes      Class 3 severe obesity due to excess calories with serious comorbidity and body mass index (BMI) of 45.0 to 49.9 in adult (HCC)  -Discussed options of Healthy[facility]-Intensive Lifestyle Intervention Program, Very Low Calorie Diet-VLCD, Conservative Program, Miguel-En-Y Gastric Bypass, and Vertical Sleeve Gastrectomy and the role of weight loss medications.Explained the importance of making lifestyle changes if utilizing medication to aid in weight loss  -not interested in surgery for weight loss  -Initial weight loss goal of 5-10% weight loss for improved health  -Screening labs and records reviewed from prior      -Patient is interested in pursuing conservative plan    Goals:  -Food log (ie.) [email or web address] calories  - To drink at least 64oz of water daily.No sugary beverages.  -discussed starting to increase exercise.  Reocmmend combination of cardio/strength training.     To start on zebpound.  To start on initial dose of medication and then to titrate as tolerated.  They have tried more than 6 months of lifestyle modifications including diet and activity changes and has had insignificant weight loss of less than 1 lb a week. Patient denies personal and family history of MCT and MEN2 tumors. Patient denies personal history of pancreatitis. Side effects discussed but not limited to diarrhea, bloating, constipation, GI upset, heartburn, increased heart rate, headache, low blood sugar, fatigue and dizziness. Titration and medication administration discussed.  Medication agreement signed 12/10/25    Initial Weight:361  Goal Weight:205        Moderate obstructive sleep apnea  -encouraged continued use of CPAP machine  -may improve with 20-30% weight loss      Prediabetes  To start on zepbound  -should improve with weight loss, dietary, and lifestyle  changes      Return in about 3 months (around 3/10/2025).    Diagnoses and all orders for this visit:    Class 3 severe obesity due to excess calories with serious comorbidity and body mass index (BMI) of 45.0 to 49.9 in adult (HCC)  -     tirzepatide (Zepbound) 2.5 mg/0.5 mL auto-injector; Inject 0.5 mL (2.5 mg total) under the skin once a week for 28 days    Primary hypertension  -     tirzepatide (Zepbound) 2.5 mg/0.5 mL auto-injector; Inject 0.5 mL (2.5 mg total) under the skin once a week for 28 days    Moderate obstructive sleep apnea  -     tirzepatide (Zepbound) 2.5 mg/0.5 mL auto-injector; Inject 0.5 mL (2.5 mg total) under the skin once a week for 28 days    Prediabetes          Subjective:   Chief Complaint   Patient presents with    Consult     MWM-Consult; Gw-205lb ; Waist-55in ; Pt has sleep apnea       Patient ID: Valentin Reid  is a 44 y.o. male with excess weight/obesity here to pursue weight management.    History reviewed. No pertinent past medical history.    HPI: Here for MWM consult    He is interested in GLP1 RA.  He has tried to lose weight on his own with diet /exercise. Lost 30 lb over the summer but then gained back 10lb.  Cut back on carbs and eating 1 main meal but more snacks.  Also tracked calories before w/ myfitnesspal and noom    Obesity/Excess Weight:  Severity:  bmi 48  w/HTN  Onset:  whole life    Modifiers: Diet and Exercise and Prescription Weight Loss Medications. Was on metformin prior. Did [employer] fitness before and lost 60lb.   Contributing factors: Stress/Emotional Eating. Also does not take the time for himself.  Also in a habit with eating     Hydration:water trying to get at least 2 L a day, unsweetened tea w/stevia  Alcohol: none  Exercise:walking in the summer but less now.    Occupation: , works nights .   Sleep:7-8 hours of sleep   Dining out/takeout:2-3 x week    Diet Recall (works nights):   Spinach drink  D-protein (chicken breast, gr. Turkey, fish),  vegetable , starch  Usually 3 snacks at work  S: cereal w/raisins  S: greek yogurt and fruit  S: energy bites-dates, cashews and vanilla    The following portions of the patient's history were reviewed and updated as appropriate: He  has no past medical history on file.  He   Patient Active Problem List    Diagnosis Date Noted    Lumbar spondylosis 08/30/2024    Chronic bilateral low back pain 08/30/2024    Primary hypertension 08/16/2024    Palpitations 05/14/2024    Panic attack 05/14/2024    Moderate obstructive sleep apnea 06/12/2023    Prediabetes 02/21/2022    Class 3 severe obesity due to excess calories with serious comorbidity and body mass index (BMI) of 45.0 to 49.9 in adult ([facility]) 11/16/2021     He  has no past surgical history on file.  His family history includes Hypertension in his mother.  He  reports that he has never smoked. He has never used smokeless tobacco. He reports that he does not drink alcohol and does not use drugs.  Current Outpatient Medications   Medication Sig Dispense Refill    chlorhexidine (PERIDEX) 0.12 % solution       fluticasone (FLONASE) 50 mcg/act nasal spray 2 sprays into each nostril daily      hydroCHLOROthiazide 12.5 mg capsule Take 12.5 mg by mouth daily      meloxicam (MOBIC) 15 mg tablet Take 1 tablet (15 mg total) by mouth daily as needed for moderate pain 30 tablet 1    tirzepatide (Zepbound) 2.5 mg/0.5 mL auto-injector Inject 0.5 mL (2.5 mg total) under the skin once a week for 28 days 2 mL 0     No current facility-administered medications for this visit.     Current Outpatient Medications on File Prior to Visit   Medication Sig    chlorhexidine (PERIDEX) 0.12 % solution     fluticasone (FLONASE) 50 mcg/act nasal spray 2 sprays into each nostril daily    hydroCHLOROthiazide 12.5 mg capsule Take 12.5 mg by mouth daily    meloxicam (MOBIC) 15 mg tablet Take 1 tablet (15 mg total) by mouth daily as needed for moderate pain     No current facility-administered  medications on file prior to visit.     He is allergic to pollen extract and shellfish allergy - food allergy..    Review of Systems   Constitutional:  Negative for fatigue.   Respiratory:  Negative for shortness of breath.    Cardiovascular:  Negative for chest pain and palpitations.   Gastrointestinal:  Negative for abdominal pain, constipation and diarrhea.   Endocrine: Negative for cold intolerance and heat intolerance.   Genitourinary:  Negative for difficulty urinating.   Skin:  Negative for rash.   Neurological:  Negative for headaches.   Psychiatric/Behavioral:  Negative for dysphoric mood. The patient is not nervous/anxious.        Objective:    /97   Pulse 86   Temp (!) 96.9 °F (36.1 °C)   Resp 17   Ht 6' (1.829 m)   Wt (!) 164 kg (361 lb)   BMI 48.96 kg/m²     Physical Exam  Vitals and nursing note reviewed.   Constitutional:       General: He is not in acute distress.     Appearance: He is well-developed. He is obese.   HENT:      Head: Normocephalic and atraumatic.   Eyes:      Conjunctiva/sclera: Conjunctivae normal.   Neck:      Thyroid: No thyromegaly.   Pulmonary:      Effort: Pulmonary effort is normal. No respiratory distress.   Skin:     Findings: No rash (visible).   Neurological:      Mental Status: He is alert and oriented to person, place, and time.   Psychiatric:         Mood and Affect: Mood normal.         Behavior: Behavior normal.

## 2024-12-10 NOTE — PATIENT INSTRUCTIONS
Patient Education     Tirzepatide (tir ZEP a tide)   Brand Names: US Mounjaro; Zepbound   Brand Names: Moise Mounjaro   Warning   This drug has been shown to cause thyroid cancer in some animals. It is not known if this happens in humans. If thyroid cancer happens, it may be deadly if not found and treated early. Call your doctor right away if you have a neck mass, trouble breathing, trouble swallowing, or have hoarseness that will not go away.  Do not use this drug if you have a health problem called Multiple Endocrine Neoplasia syndrome type 2 (MEN 2), or if you or a family member have had thyroid cancer.  Have your blood work checked and thyroid ultrasounds as you have been told by your doctor.  What is this drug used for?   It is used to lower blood sugar in people with type 2 diabetes.  It is used to help with weight loss in certain people.  What do I need to tell my doctor BEFORE I take this drug?   All products:   If you are allergic to this drug; any part of this drug; or any other drugs, foods, or substances. Tell your doctor about the allergy and what signs you had.  If you have ever had pancreatitis.  If you have stomach or bowel problems.  If you are using another drug that has the same drug in it.  If you are using another drug like this one. If you are not sure, ask your doctor or pharmacist.  If you are using this drug for diabetes:   If you have type 1 diabetes. Do not use this drug to treat type 1 diabetes.  Zepbound:   If you have or have ever had depression or thoughts of suicide.  This is not a list of all drugs or health problems that interact with this drug.  Tell your doctor and pharmacist about all of your drugs (prescription or OTC, natural products, vitamins) and health problems. You must check to make sure that it is safe for you to take this drug with all of your drugs and health problems. Do not start, stop, or change the dose of any drug without checking with your doctor.  What are  some things I need to know or do while I take this drug?   All products:   Tell all of your health care providers that you take this drug. This includes your doctors, nurses, pharmacists, and dentists.  Follow the diet and workout plan that your doctor told you about.  Talk with your doctor before you drink alcohol.  Birth control pills may not work as well to prevent pregnancy. If you take birth control pills, you may need to switch to another type of hormone-based birth control like a vaginal ring if your doctor tells you to. If another type of hormone-based birth control is not an option, use some other kind of birth control also, like a condom. Do this for 4 weeks after starting this drug and for 4 weeks each time the dose is raised.  This drug may prevent other drugs taken by mouth from getting into the body. If you take other drugs by mouth, you may need to take them at some other time than this drug. Talk with your doctor.  Do not share with another person even if the needle has been changed. Sharing your tray or pen may pass infections from one person to another. This includes infections you may not know you have.  If you cannot drink liquids by mouth or if you have upset stomach, throwing up, or diarrhea that does not go away, you need to avoid getting dehydrated. Contact your doctor to find out what to do. Dehydration may lead to low blood pressure or to new or worsening kidney problems.  A severe and sometimes deadly pancreas problem (pancreatitis) has happened with other drugs like this one.  If you are using this drug for diabetes:   It may be harder to control blood sugar during times of stress such as fever, infection, injury, or surgery. A change in physical activity, exercise, or diet may also affect blood sugar.  Check your blood sugar as you have been told by your doctor.  Do not drive if your blood sugar has been low. There is a greater chance of you having a crash.  Wear disease medical alert ID  (identification).  Tell your doctor if you are pregnant, plan on getting pregnant, or are breast-feeding. You will need to talk about the benefits and risks to you and the baby.  Zepbound:   If you have high blood sugar (diabetes), you will need to watch your blood sugar closely.  Weight loss during pregnancy may cause harm to the unborn baby. If you get pregnant while taking this drug or if you want to get pregnant, call your doctor right away.  Tell your doctor if you are breast-feeding. You will need to talk about any risks to your baby.  What are some side effects that I need to call my doctor about right away?   WARNING/CAUTION: Even though it may be rare, some people may have very bad and sometimes deadly side effects when taking a drug. Tell your doctor or get medical help right away if you have any of the following signs or symptoms that may be related to a very bad side effect:  All products:   Signs of an allergic reaction, like rash; hives; itching; red, swollen, blistered, or peeling skin with or without fever; wheezing; tightness in the chest or throat; trouble breathing, swallowing, or talking; unusual hoarseness; or swelling of the mouth, face, lips, tongue, or throat.  Signs of kidney problems like unable to pass urine, change in how much urine is passed, blood in the urine, or a big weight gain.  Signs of gallbladder problems like pain in the upper right belly area, right shoulder area, or between the shoulder blades; yellow skin or eyes; fever with chills; bloating; or very upset stomach or throwing up.  Signs of a pancreas problem (pancreatitis) like very bad stomach pain, very bad back pain, or very bad upset stomach or throwing up.  Dizziness or passing out.  A fast heartbeat.  Change in eyesight.  Low blood sugar can happen. The chance may be raised when this drug is used with other drugs for diabetes. Signs may be dizziness, headache, feeling sleepy or weak, shaking, fast heartbeat, confusion,  hunger, or sweating. Call your doctor right away if you have any of these signs. Follow what you have been told to do for low blood sugar. This may include taking glucose tablets, liquid glucose, or some fruit juices.  Zepbound:   New or worse behavior or mood changes like depression or thoughts of suicide.  What are some other side effects of this drug?   All drugs may cause side effects. However, many people have no side effects or only have minor side effects. Call your doctor or get medical help if any of these side effects or any other side effects bother you or do not go away:  All products:   Constipation, diarrhea, stomach pain, upset stomach, throwing up, or decreased appetite.  Heartburn.  Pain, itching, or other irritation where the injection was given.  Zepbound:   Feeling tired or weak.  Hair loss.  These are not all of the side effects that may occur. If you have questions about side effects, call your doctor. Call your doctor for medical advice about side effects.  You may report side effects to your national health agency.  You may report side effects to the FDA at 1-799.743.9688. You may also report side effects at https://www.fda.gov/medwatch.  How is this drug best taken?   Use this drug as ordered by your doctor. Read all information given to you. Follow all instructions closely.  All products:   It is given as a shot into the fatty part of the skin on the top of the thigh, belly area, or upper arm.  If you will be giving yourself the shot, your doctor or nurse will teach you how to give the shot.  Keep taking this drug as you have been told by your doctor or other health care provider, even if you feel well.  Take the same day each week.  Move site where you give the shot each time.  Take with or without food.  Wash your hands before and after use.  Do not use if the solution is leaking or has particles.  This drug is colorless to a faint yellow. Do not use if the solution changes color.  Do not  move this drug from the pen to a syringe.  Each pen or vial is for 1 use only. Throw away any part of the used pen after the dose is given.  Throw away needles in a needle/sharp disposal box. Do not reuse needles or other items. When the box is full, follow all local rules for getting rid of it. Talk with a doctor or pharmacist if you have any questions.  Vials:   It is important to have the right syringe to measure your dose. If you do not have the right syringe or you are not sure, talk with your pharmacist.  If you are using this drug for diabetes:   If you are also using insulin, you may inject this drug and the insulin in the same area of the body but not right next to each other.  Do not mix this drug in the same syringe with insulin.  What do I do if I miss a dose?   If it is within 4 days after the missed dose, take the missed dose and go back to your normal day.  If it has been more than 4 days since the missed dose, skip the missed dose and go back to your normal day.  Do not take 2 doses at the same time or extra doses.  How do I store and/or throw out this drug?   Store in a refrigerator. Do not freeze.  Do not use if it has been frozen.  If needed, each pen or vial may be stored at room temperature for up to 21 days. If you store at room temperature, throw away any part not used after 21 days.  Protect from heat.  Store in the original container to protect from light.  Keep all drugs in a safe place. Keep all drugs out of the reach of children and pets.  Throw away unused or  drugs. Do not flush down a toilet or pour down a drain unless you are told to do so. Check with your pharmacist if you have questions about the best way to throw out drugs. There may be drug take-back programs in your area.  General drug facts   If your symptoms or health problems do not get better or if they become worse, call your doctor.  Do not share your drugs with others and do not take anyone else's drugs.  Some drugs  may have another patient information leaflet. If you have any questions about this drug, please talk with your doctor, nurse, pharmacist, or other health care provider.  This drug comes with an extra patient fact sheet called a Medication Guide. Read it with care. Read it again each time this drug is refilled. If you have any questions about this drug, please talk with the doctor, pharmacist, or other health care provider.  If you think there has been an overdose, call your poison control center or get medical care right away. Be ready to tell or show what was taken, how much, and when it happened.  Consumer Information Use and Disclaimer   This generalized information is a limited summary of diagnosis, treatment, and/or medication information. It is not meant to be comprehensive and should be used as a tool to help the user understand and/or assess potential diagnostic and treatment options. It does NOT include all information about conditions, treatments, medications, side effects, or risks that may apply to a specific patient. It is not intended to be medical advice or a substitute for the medical advice, diagnosis, or treatment of a health care provider based on the health care provider's examination and assessment of a patient's specific and unique circumstances. Patients must speak with a health care provider for complete information about their health, medical questions, and treatment options, including any risks or benefits regarding use of medications. This information does not endorse any treatments or medications as safe, effective, or approved for treating a specific patient. UpToDate, Inc. and its affiliates disclaim any warranty or liability relating to this information or the use thereof. The use of this information is governed by the Terms of Use, available at https://www.wolterskluwer.com/en/know/clinical-effectiveness-terms.  Last Reviewed Date   2024-04-17  Copyright   © 2024 UpToDate, Inc. and  its affiliates and/or licensors. All rights reserved.

## 2024-12-10 NOTE — ASSESSMENT & PLAN NOTE
-Discussed options of HealthyCORE-Intensive Lifestyle Intervention Program, Very Low Calorie Diet-VLCD, Conservative Program, Miguel-En-Y Gastric Bypass, and Vertical Sleeve Gastrectomy and the role of weight loss medications.Explained the importance of making lifestyle changes if utilizing medication to aid in weight loss  -not interested in surgery for weight loss  -Initial weight loss goal of 5-10% weight loss for improved health  -Screening labs and records reviewed from prior      -Patient is interested in pursuing conservative plan    Goals:  -Food log (ie.) www.[email or web address],[place],[employer]-2200-2400 calories  - To drink at least 64oz of water daily.No sugary beverages.  -discussed starting to increase exercise.  Reocmmend combination of cardio/strength training.     To start on zebpound.  To start on initial dose of medication and then to titrate as tolerated.  They have tried more than 6 months of lifestyle modifications including diet and activity changes and has had insignificant weight loss of less than 1 lb a week. Patient denies personal and family history of MCT and MEN2 tumors. Patient denies personal history of pancreatitis. Side effects discussed but not limited to diarrhea, bloating, constipation, GI upset, heartburn, increased heart rate, headache, low blood sugar, fatigue and dizziness. Titration and medication administration discussed.  Medication agreement signed 12/10/25    Initial Weight:361  Goal Weight:205

## 2024-12-12 ENCOUNTER — TELEPHONE (OUTPATIENT)
Dept: BARIATRICS | Facility: CLINIC | Age: 44
End: 2024-12-12

## 2024-12-12 NOTE — TELEPHONE ENCOUNTER
PA for Zepbound SUBMITTED to [employer]     via    [x]CMM-KEY: [id number]  []Surescripts-Case ID #   []Availity-Auth ID # NDC #   []Faxed to plan   []Other website   []Phone call Case ID #     []PA sent as URGENT    All office notes, labs and other pertaining documents and studies sent. Clinical questions answered. Awaiting determination from insurance company.     Turnaround time for your insurance to make a decision on your Prior Authorization can take 7-21 business days.

## 2024-12-12 NOTE — TELEPHONE ENCOUNTER
PA for Zepbound APPROVED     Date(s) approved N/A- from covermymeds     Case #    Patient advised by          []GlobalWorxhart Message  [x]Phone call   []LMOM  []L/M to call office as no active Communication consent on file  []Unable to leave detailed message as VM not approved on Communication consent       Pharmacy advised by    [x]Fax  []Phone call    Approval letter scanned into Media No- from covermymeds

## 2024-12-13 NOTE — TELEPHONE ENCOUNTER
My mistake. He is approved. It says drug is covered and no PA is needed at this time. Will adjust dot phrase and call pt

## 2024-12-27 ENCOUNTER — TELEPHONE (OUTPATIENT)
Dept: BARIATRICS | Facility: CLINIC | Age: 44
End: 2024-12-27

## 2024-12-27 NOTE — TELEPHONE ENCOUNTER
Reason for call:   [] Refill   [] Prior Auth  [x] Other: Patient would like does increase increase to 5 mg     Office:   [] PCP/Provider -   [x] Specialty/Provider - Shahbaz Barrera    Medication: Zepbound     Dose/Frequency: current dose 2.5 mg weekly    Quantity: 2 ml     [facility]    Does the patient have enough for 3 days?   [x] Yes   [] No - Send as HP to POD    How are you tolerating the medication?   [] Nausea  [] Vomiting  [] Diarrhea  [x] Asymptomatic  [] Other:    Last visit weight:    Current weight:356    Date of last injection:12/21/24    How many injections do you have left: 2    Would you like an increase in your dose?  [ x] Yes

## 2024-12-29 DIAGNOSIS — Z00.6 ENCOUNTER FOR EXAMINATION FOR NORMAL COMPARISON OR CONTROL IN CLINICAL RESEARCH PROGRAM: ICD-10-CM

## 2024-12-30 DIAGNOSIS — E66.01 CLASS 3 SEVERE OBESITY DUE TO EXCESS CALORIES WITH SERIOUS COMORBIDITY AND BODY MASS INDEX (BMI) OF 45.0 TO 49.9 IN ADULT (HCC): ICD-10-CM

## 2024-12-30 DIAGNOSIS — G47.33 MODERATE OBSTRUCTIVE SLEEP APNEA: ICD-10-CM

## 2024-12-30 DIAGNOSIS — E66.813 CLASS 3 SEVERE OBESITY DUE TO EXCESS CALORIES WITH SERIOUS COMORBIDITY AND BODY MASS INDEX (BMI) OF 45.0 TO 49.9 IN ADULT (HCC): ICD-10-CM

## 2024-12-30 DIAGNOSIS — I10 PRIMARY HYPERTENSION: Primary | ICD-10-CM

## 2024-12-30 RX ORDER — TIRZEPATIDE 5 MG/.5ML
5 INJECTION, SOLUTION SUBCUTANEOUS WEEKLY
Qty: 2 ML | Refills: 1 | Status: SHIPPED | OUTPATIENT
Start: 2024-12-30 | End: 2025-02-24

## 2025-01-05 ENCOUNTER — HOSPITAL ENCOUNTER (OUTPATIENT)
Dept: MRI IMAGING | Facility: HOSPITAL | Age: 45
Discharge: HOME | End: 2025-01-05
Payer: COMMERCIAL

## 2025-01-05 ENCOUNTER — APPOINTMENT (OUTPATIENT)
Dept: LAB | Facility: HOSPITAL | Age: 45
End: 2025-01-05
Attending: PATHOLOGY

## 2025-01-05 DIAGNOSIS — M54.50 CHRONIC BILATERAL LOW BACK PAIN WITHOUT SCIATICA: ICD-10-CM

## 2025-01-05 DIAGNOSIS — G89.29 CHRONIC BILATERAL LOW BACK PAIN WITHOUT SCIATICA: ICD-10-CM

## 2025-01-05 DIAGNOSIS — Z00.6 ENCOUNTER FOR EXAMINATION FOR NORMAL COMPARISON OR CONTROL IN CLINICAL RESEARCH PROGRAM: ICD-10-CM

## 2025-01-05 PROCEDURE — 36415 COLL VENOUS BLD VENIPUNCTURE: CPT

## 2025-01-05 PROCEDURE — 72148 MRI LUMBAR SPINE W/O DYE: CPT

## 2025-01-08 ENCOUNTER — OFFICE VISIT (OUTPATIENT)
Dept: FAMILY MEDICINE CLINIC | Facility: CLINIC | Age: 45
End: 2025-01-08

## 2025-01-08 VITALS
HEIGHT: 72 IN | DIASTOLIC BLOOD PRESSURE: 94 MMHG | RESPIRATION RATE: 18 BRPM | OXYGEN SATURATION: 99 % | BODY MASS INDEX: 42.66 KG/M2 | TEMPERATURE: 98.1 F | WEIGHT: 315 LBS | SYSTOLIC BLOOD PRESSURE: 134 MMHG | HEART RATE: 84 BPM

## 2025-01-08 DIAGNOSIS — E66.813 CLASS 3 SEVERE OBESITY DUE TO EXCESS CALORIES WITH SERIOUS COMORBIDITY AND BODY MASS INDEX (BMI) OF 45.0 TO 49.9 IN ADULT (HCC): ICD-10-CM

## 2025-01-08 DIAGNOSIS — E66.01 CLASS 3 SEVERE OBESITY DUE TO EXCESS CALORIES WITH SERIOUS COMORBIDITY AND BODY MASS INDEX (BMI) OF 45.0 TO 49.9 IN ADULT (HCC): ICD-10-CM

## 2025-01-08 DIAGNOSIS — M54.50 CHRONIC BILATERAL LOW BACK PAIN WITHOUT SCIATICA: ICD-10-CM

## 2025-01-08 DIAGNOSIS — G47.33 MODERATE OBSTRUCTIVE SLEEP APNEA: ICD-10-CM

## 2025-01-08 DIAGNOSIS — T78.40XA ALLERGY, INITIAL ENCOUNTER: ICD-10-CM

## 2025-01-08 DIAGNOSIS — G89.29 CHRONIC BILATERAL LOW BACK PAIN WITHOUT SCIATICA: ICD-10-CM

## 2025-01-08 DIAGNOSIS — F41.0 PANIC ATTACK: ICD-10-CM

## 2025-01-08 DIAGNOSIS — Z76.89 ENCOUNTER TO ESTABLISH CARE: Primary | ICD-10-CM

## 2025-01-08 DIAGNOSIS — I10 PRIMARY HYPERTENSION: ICD-10-CM

## 2025-01-08 PROCEDURE — 99203 OFFICE O/P NEW LOW 30 MIN: CPT | Performed by: FAMILY MEDICINE

## 2025-01-08 RX ORDER — EPINEPHRINE 0.3 MG/.3ML
0.3 INJECTION SUBCUTANEOUS ONCE
Qty: 0.6 ML | Refills: 0 | Status: SHIPPED | OUTPATIENT
Start: 2025-01-08 | End: 2025-01-08

## 2025-01-08 NOTE — PATIENT INSTRUCTIONS
Patient Education     Panic Attack ED   General Information   You came to the Emergency Department (ED) after a panic attack. This is when you feel very scared and anxious for a short period of time. You may also have chest pain or trouble breathing with your panic attack. Some people may feel dizzy or have a fast heartbeat, stomachache, or headache as well. Panic attacks can happen at any time. They may go away quickly or last for an hour or so. You may need follow-up care to help manage your panic attacks.  What care is needed at home?   Call your regular doctor to let them know you were in the ED. Make a follow-up appointment if you were told to.  Set a time to talk with a counselor about your worries and feelings. This can help you with your panic attacks.  Take care to follow all instructions when you take your medicines.  Limit alcohol and caffeine.  Learn ways to manage stress. Relaxation methods like deep breathing and muscle relaxation may be helpful. Things like yoga, exercise, and hcarley chi are also good.  Talk about your feelings with family members and friends you trust. Talk to someone who can help you see how your thoughts at certain times may contribute to your panic attacks.  When do I need to get emergency help?   Call for an ambulance right away if:   You feel you may harm yourself or someone else.  You can also call a mental health hotline for help _____________________________.  Return to the ED if:   You have any physical symptoms, such as chest pain, trouble breathing, or severe belly pain, that could be a sign of a serious medical problem.  When do I need to call the doctor?   If you feel like you may have another panic attack.  If you do not feel like you can be alone.  You have new or worsening symptoms.  Last Reviewed Date   2021-06-02  Consumer Information Use and Disclaimer   This generalized information is a limited summary of diagnosis, treatment, and/or medication information. It is not  meant to be comprehensive and should be used as a tool to help the user understand and/or assess potential diagnostic and treatment options. It does NOT include all information about conditions, treatments, medications, side effects, or risks that may apply to a specific patient. It is not intended to be medical advice or a substitute for the medical advice, diagnosis, or treatment of a health care provider based on the health care provider's examination and assessment of a patient’s specific and unique circumstances. Patients must speak with a health care provider for complete information about their health, medical questions, and treatment options, including any risks or benefits regarding use of medications. This information does not endorse any treatments or medications as safe, effective, or approved for treating a specific patient. UpToDate, Inc. and its affiliates disclaim any warranty or liability relating to this information or the use thereof. The use of this information is governed by the Terms of Use, available at https://www.woltersPaperFlieser.com/en/know/clinical-effectiveness-terms   Copyright   Copyright © 2024 UpToDate, Inc. and its affiliates and/or licensors. All rights reserved.

## 2025-01-08 NOTE — ASSESSMENT & PLAN NOTE
Chronic, follows with pain management  Mild annular bulging and lumbar degenerative change with only minor foraminal narrowing, most prominent at the L4-5 level on the right where disc material is in contact with the L4 nerve.   continue to follow with weight management

## 2025-01-09 NOTE — PROGRESS NOTES
Name: Valentin Reid      : 1980      MRN: [id number]  Encounter Provider: Rachel Eugene MD  Encounter Date: 2025   Encounter department: [facility]  :  Assessment & Plan  Encounter to establish care         Primary hypertension  Well manage on Hydrochlorthiazide 12.5 mg, continue          Moderate obstructive sleep apnea  On cpap, continue          Chronic bilateral low back pain without sciatica  Chronic, follows with pain management  Mild annular bulging and lumbar degenerative change with only minor foraminal narrowing, most prominent at the L4-5 level on the right where disc material is in contact with the L4 nerve.   continue to follow with weight management          Allergy, initial encounter  Environmental allergies and shrimp allergies   Orders:    EPINEPHrine (EPIPEN) 0.3 mg/0.3 mL SOAJ; Inject 0.3 mL (0.3 mg total) into a muscle once for 1 dose    Class 3 severe obesity due to excess calories with serious comorbidity and body mass index (BMI) of 45.0 to 49.9 in adult (HCC)  Follows with weight management, currently on zepbound, continue          Panic attack  Of several months duration   Last episodes occurred while taking a walk with wife,   Got better after a few minutes of deep breath   Recommend finding behavioral techniques that can help him during this episodes,   Recommend following with a therapist, he states he will look into it through his job   If there are not helping, could consider  medication as needed              BMI Counseling: Body mass index is 48.02 kg/m². The BMI is above normal. Nutrition recommendations include encouraging healthy choices of fruits and vegetables. Exercise recommendations include exercising 3-5 times per week. No pharmacotherapy was ordered. Patient referred to weight management. Rationale for BMI follow-up plan is due to patient being overweight or obese.     Depression Screening and Follow-up Plan: Patient  was screened for depression during today's encounter. They screened negative with a PHQ-2 score of 0.      History of Present Illness     HPI  Valentin Reid is a 44 y.o. male who present to the office with his wife to establish care  Report lately a lot of things are affecting his health and he wants to work on them   He gets panic attacks sometimes, has back pain, knee pain and elbow pain. He sees PT for it and pain management for his back pain. For his elbow pain it is through his work and surgery is planned for it     Review of Systems    Objective   /94 (BP Location: Left arm, Patient Position: Sitting, Cuff Size: Thigh)   Pulse 84   Temp 98.1 °F (36.7 °C) (Temporal)   Resp 18   Ht 6' (1.829 m)   Wt (!) 161 kg (354 lb 1.6 oz)   SpO2 99%   BMI 48.02 kg/m²      Physical Exam  Constitutional:       Appearance: Normal appearance. He is obese.   Cardiovascular:      Rate and Rhythm: Normal rate and regular rhythm.   Pulmonary:      Effort: Pulmonary effort is normal.      Breath sounds: Normal breath sounds.   Neurological:      General: No focal deficit present.      Mental Status: He is alert and oriented to person, place, and time.

## 2025-01-09 NOTE — PROGRESS NOTES
Assessment:  1. Lumbar spondylosis    2. Chronic bilateral low back pain, unspecified whether sciatica present    3. Lumbar foraminal stenosis        Plan:  44-year-old male with a history of chronic low back pain and lumbar spondylosis returning for follow-up.  Aside from the patient's low back pain he will occasionally have some numbness in the lateral left leg.  Otherwise denies any other radicular symptoms.  Patient had an MRI of his lumbar spine which demonstrates spondylosis at L4-5 and L5-S1.  Mild central and right foraminal stenosis at L4-5 with disc material contacting right L4 root.  Mild bilateral foraminal stenosis at L5-S1.  Physical therapy was not helpful.  Neither was Tylenol or ibuprofen.  He was prescribed meloxicam but is not really taking this right now.  He is currently trying to lose weight with the hopes that this will improve his back pain.    1.  We once again discussed bilateral L3-5 medial branch blocks to address the facet mediated component of his low back pain.  He would like to hold off on interventional therapy at this time.  2.  Patient may take meloxicam 15 mg daily as needed as prescribed  3.  Encouraged patient's weight loss  4.  I will follow-up with the patient on a as needed basis      History of Present Illness:  The patient is a 44 y.o. male with a history of chronic low back pain and lumbar spondylosis returning for follow-up.  Aside from the patient's low back pain he will occasionally have some numbness in the lateral left leg.  Otherwise denies any other radicular symptoms.  He denies any bladder or bowel incontinence or saddle anesthesia.  Patient had an MRI of his lumbar spine which demonstrates spondylosis at L4-5 and L5-S1.  Mild central and right foraminal stenosis at L4-5 with disc material contacting right L4 root.  Mild bilateral foraminal stenosis at L5-S1.  Physical therapy was not helpful.  Neither was Tylenol or ibuprofen.  He was prescribed meloxicam but is  not really taking this right now.  He is currently trying to lose weight with the hopes that this will improve his back pain.  The patient rates his pain a 2 out of 10 and the pain is occasional.  The pain is described as dull and aching.  The pain is increased with standing, walking, bending, and exercise.  Pain is alleviated with relaxation.    Other than as stated above, the patient denies any interval changes in medications, medical condition, mental condition, symptoms, or allergies since the last office visit.    I have personally reviewed and/or updated the patient's past medical history, past surgical history, family history, social history, current medications, allergies, and vital signs today.         Review of Systems  Review of Systems   Constitutional:  Positive for activity change.   Gastrointestinal:  Positive for nausea.   Musculoskeletal:  Positive for arthralgias, back pain and gait problem.        Decreased ROM, Muscle Weakness         No past medical history on file.    No past surgical history on file.    Family History   Problem Relation Age of Onset    Diabetes Mother     Hypertension Mother     Diabetes Father     Hypertension Father     Psychiatric Illness Father     Asthma Sister        Social History     Occupational History    Not on file   Tobacco Use    Smoking status: Never    Smokeless tobacco: Never   Vaping Use    Vaping status: Never Used   Substance and Sexual Activity    Alcohol use: Never    Drug use: Never    Sexual activity: Yes     Partners: Female         Current Outpatient Medications:     chlorhexidine (PERIDEX) 0.12 % solution, , Disp: , Rfl:     EPINEPHrine (EPIPEN) 0.3 mg/0.3 mL SOAJ, Inject 0.3 mL (0.3 mg total) into a muscle once for 1 dose, Disp: 0.6 mL, Rfl: 0    fluticasone (FLONASE) 50 mcg/act nasal spray, 2 sprays into each nostril daily, Disp: , Rfl:     hydroCHLOROthiazide 12.5 mg capsule, Take 12.5 mg by mouth daily, Disp: , Rfl:     meloxicam (MOBIC) 15 mg  tablet, Take 1 tablet (15 mg total) by mouth daily as needed for moderate pain, Disp: 30 tablet, Rfl: 1    tirzepatide (Zepbound) 5 mg/0.5 mL auto-injector, Inject 0.5 mL (5 mg total) under the skin once a week, Disp: 2 mL, Rfl: 1    Allergies   Allergen Reactions    Pollen Extract Cough, Eye Swelling, Fever, Headache, Nasal Congestion and Sneezing    Shellfish Allergy - Food Allergy Hives, Itching and Rash       Physical Exam:    Wt (!) 159 kg (351 lb 3.2 oz)   BMI 47.63 kg/m²     Constitutional:normal, well developed, well nourished, alert, in no distress and non-toxic and no overt pain behavior.  Eyes:anicteric  HEENT:grossly intact  Neck:supple, symmetric, trachea midline and no masses   Pulmonary:even and unlabored  Cardiovascular:No edema or pitting edema present  Skin:Normal without rashes or lesions and well hydrated  Psychiatric:Mood and affect appropriate  Neurologic:Cranial Nerves II-XII grossly intact  Musculoskeletal:normal gait.  Bilateral lumbar paraspinals nontender to palpation.  Bilateral SI joints nontender to palpation.  Bilateral lower extremity strength 5 out of 5 in all muscle groups.  Sensation intact to light touch in L3-S1 dermatomes bilaterally.  Negative seated straight leg raise bilaterally.  Positive lumbar facet loading maneuvers bilaterally.    Imaging  MRI LUMBAR SPINE WITHOUT CONTRAST     INDICATION: M54.50: Low back pain, unspecified  G89.29: Other chronic pain.     COMPARISON:  None.     TECHNIQUE:  Multiplanar, multisequence imaging of the lumbar spine was performed. .        IMAGE QUALITY:  Diagnostic     FINDINGS:     VERTEBRAL BODIES:  There are 5 lumbar type vertebral bodies. Minimal anterior spondylolisthesis of S1 in relation to L5. Normal marrow signal is identified within the visualized bony structures.  No discrete marrow lesion.     SACRUM:  Normal signal within the sacrum. No evidence of insufficiency or stress fracture.     DISTAL CORD AND CONUS:  Normal size and  signal within the distal cord and conus.     PARASPINAL SOFT TISSUES:  Paraspinal soft tissues are unremarkable.     LOWER THORACIC DISC SPACES:  Normal disc height and signal.  No disc herniation, canal stenosis or foraminal narrowing.     LUMBAR DISC SPACES:     L1-L2:  Normal.     L2-L3:  Normal.     L3-L4: Normal disc height and signal. Minimal annular bulging. No cauda equina or foraminal nerve impingement.     L4-L5: Normal disc height. Mild annular bulging diffusely with mild bilateral facet arthropathy. There is mild canal stenosis and right-sided foraminal narrowing with disc material in contact with the right L4 nerve. Correlate for right-sided   radiculopathy.     L5-S1: Annular bulging diffusely. No disc herniation. No canal stenosis. Mild bilateral foraminal narrowing.     OTHER FINDINGS: Upper and lower pole renal cysts are noted on the left.     IMPRESSION:     Mild annular bulging and lumbar degenerative change with only minor foraminal narrowing, most prominent at the L4-5 level on the right where disc material is in contact with the L4 nerve.

## 2025-01-09 NOTE — ASSESSMENT & PLAN NOTE
Of several months duration   Last episodes occurred while taking a walk with wife,   Got better after a few minutes of deep breath   Recommend finding behavioral techniques that can help him during this episodes,   Recommend following with a therapist, he states he will look into it through his job   If there are not helping, could consider  medication as needed

## 2025-01-10 ENCOUNTER — OFFICE VISIT (OUTPATIENT)
Dept: PAIN MEDICINE | Facility: CLINIC | Age: 45
End: 2025-01-10
Payer: COMMERCIAL

## 2025-01-10 VITALS — BODY MASS INDEX: 47.63 KG/M2 | WEIGHT: 315 LBS

## 2025-01-10 DIAGNOSIS — M47.816 LUMBAR SPONDYLOSIS: Primary | ICD-10-CM

## 2025-01-10 DIAGNOSIS — M48.061 LUMBAR FORAMINAL STENOSIS: ICD-10-CM

## 2025-01-10 DIAGNOSIS — G89.29 CHRONIC BILATERAL LOW BACK PAIN, UNSPECIFIED WHETHER SCIATICA PRESENT: ICD-10-CM

## 2025-01-10 DIAGNOSIS — M54.50 CHRONIC BILATERAL LOW BACK PAIN, UNSPECIFIED WHETHER SCIATICA PRESENT: ICD-10-CM

## 2025-01-10 PROCEDURE — 99214 OFFICE O/P EST MOD 30 MIN: CPT | Performed by: ANESTHESIOLOGY

## 2025-01-10 RX ORDER — CELECOXIB 200 MG/1
CAPSULE ORAL
COMMUNITY
Start: 2024-12-16

## 2025-01-17 LAB
APOB+LDLR+PCSK9 GENE MUT ANL BLD/T: NOT DETECTED
BRCA1+BRCA2 DEL+DUP + FULL MUT ANL BLD/T: NOT DETECTED
MLH1+MSH2+MSH6+PMS2 GN DEL+DUP+FUL M: NOT DETECTED

## 2025-01-28 ENCOUNTER — TELEPHONE (OUTPATIENT)
Age: 45
End: 2025-01-28

## 2025-01-28 NOTE — TELEPHONE ENCOUNTER
Patient is having elbow surgery on 2/25 and was instructed by his surgeon to check with [facility] on when to stop his Zepbound 5mg prior to surgery.  Please reach out to the patient and let him know.  I'm routing this to [facility] and Shahbaz Brewer wrote the last rx but his next follow up is at [facility] with Dr Joshua

## 2025-02-11 ENCOUNTER — TELEPHONE (OUTPATIENT)
Dept: BARIATRICS | Facility: CLINIC | Age: 45
End: 2025-02-11

## 2025-02-11 NOTE — TELEPHONE ENCOUNTER
Summary: Virtual for 03/12/25   Called patient [facility] to advise that on 03/12 Dr. Joshua will be conducting only Virtual Visits for the day, if desired please call and confirm the change to a virtual or r/s the appt.

## 2025-02-13 ENCOUNTER — TELEPHONE (OUTPATIENT)
Age: 45
End: 2025-02-13

## 2025-02-13 NOTE — TELEPHONE ENCOUNTER
APPOINTMENTS MUST BE SCHEDULED A MINIMUM OF 14 DAYS PRIOR TO LEAVING FOR THEIR TRIP:??????   When are you traveling? June 17th, 2025  Where specifically are you traveling to and for how long? (Must include entering country to leaving country) June 17th - July 2nd, 2025 / [place], [place]  Are you scheduling this appointment for just you or a group of people?? If scheduling for a group, how many?? (Maximum of 6 individuals, more than 6 send message to office for approval) Group of 6  Have you ever been seen in our office before??No  If yes, record must be found within patients’ chart  For our records: What pharmacy do you use? [facility], [address]  General Information:  Please be advised that we do not accept insurance for these visits. It will be out of pocket.  Payment is collected after your appointment with the physician and is based on his individual recommendations and what you receive here in the office.  Our provider will only offer what is required/highly recommended for your trip.  Previous patients who were already seen and only require malaria prophylaxis do not need scheduled visit. Send the medication request to the office for refill.  We do not carry many of the immunizations within our office due to how expensive they can be. Please note: immunizations will most likely need to be ordered and take up to two to three days to come in. We will call you when they arrive.  If you are being seen in a group: Please arrive 15 minutes prior to the earliest appointment time as you will be seen together.  Groups with 3 or more people:  Schedule 15-minute appointment for each patient at the end of the day.  If patient is 12 years old or younger:  Always book patient at the end of the day. Use more than one time slot as necessary.    - Please bring your itinerary with you and your yellow CDC card, if you have one. If you do not have a card, we will provide you with one.    - Please be aware that when you  receive your vaccinations, you will be asked to remain in the office for 15 minutes prior to getting your vaccine.

## 2025-02-14 NOTE — TELEPHONE ENCOUNTER
Patient called back, he would rather have an in person appt, I canceled his current appt on 3/12, please call him back to reschedule an in person follow up, thanks.

## 2025-03-01 DIAGNOSIS — G47.33 MODERATE OBSTRUCTIVE SLEEP APNEA: ICD-10-CM

## 2025-03-01 DIAGNOSIS — I10 PRIMARY HYPERTENSION: ICD-10-CM

## 2025-03-01 DIAGNOSIS — E66.813 CLASS 3 SEVERE OBESITY DUE TO EXCESS CALORIES WITH SERIOUS COMORBIDITY AND BODY MASS INDEX (BMI) OF 45.0 TO 49.9 IN ADULT (HCC): ICD-10-CM

## 2025-03-01 DIAGNOSIS — E66.01 CLASS 3 SEVERE OBESITY DUE TO EXCESS CALORIES WITH SERIOUS COMORBIDITY AND BODY MASS INDEX (BMI) OF 45.0 TO 49.9 IN ADULT (HCC): ICD-10-CM

## 2025-03-03 RX ORDER — TIRZEPATIDE 5 MG/.5ML
INJECTION, SOLUTION SUBCUTANEOUS
Qty: 2 ML | Refills: 0 | Status: SHIPPED | OUTPATIENT
Start: 2025-03-03

## 2025-03-16 NOTE — PROGRESS NOTES
Program: [facility]    Assessment/Plan     Valentin Reid  is a 44 y.o. male with  returns to follow up  for treatment of excess body weight and associated risk factors/co-morbidities.     Class 3 severe obesity due to excess calories with serious comorbidity and body mass index (BMI) of 40.0 to 44.9 in adult (HCC)  Antiobesity Medications/Medical --was seen at [facility].  Would like to follow-up at [place] as it is closer  Currently on Zepbound 5 mg and he has been on the same dose for 2 months.  Will be starting his third box  Increase the dose to 7.5 mg.  Requested patient to reach out via portal to titrate up to next dose  Will review contraindications to oral medications at follow-up visits  Would consider metformin first-line medication in combination with GLP-1 due to synergistic mechanism of action  Outlined a weight loss goal of at least 1 pound a week  Comorbidities of prediabetes  Will need labs to evaluate for thyroid and cholesterol next office visit      Nutrition:    Reviewed with patient that he would need to increase protein intake and decrease simple carbs such as fruits and dried cranberries which are high in sugar.  Encouraged patient to meet and work with a dietitian-patient declines at this time as he states he knows what he has to eat.    Physical Activity:   Goes to the [facility].   Walks on treadmill   Had elbow surgery 2 weeks ago so unable to do any weights until cleared by surgery.  Has not started physical therapy    Sleep: -  Obstructive sleep apnea on CPAP; adequate sleep duration 8+ hours    Food Behaviors/Stress/pyschosocial:  Wants to use Noom or MFP to guide protein intake  Reports hunger and cravings are adequately suppressed on this dose       Most recent notes and records were reviewed.         Return visit:  2-3 months     Nutrition   Do not skip meals. Avoid sugary beverages. At least 64oz of water daily.    Behavioral/Stress   Food log via dionisio or provided paper  "log (dionisio options include www.myfitnesspal.com, sparkpeople.com, Edventoryit.com, Breathing Buildings.com, uVore). Encouraged mindful eating    Physical Activity  Increase physical activity by 10 minutes daily. Gradually increase physical activity to a goal of 5 days per week for 30 minutes of MODERATE intensity ( ( should be able to pass the \"talk test\" but should not be able to sing.  target 150-300 minutes  PLUS 2-3 days per week of FULL BODY resistance training. Progression will be addressed at follow up visits. Encouraged planning regarding establishing physical activity routine    Sleep  Provided sleep hygiene counseling and importance of having adequate sleep duration          Valentin was seen today for follow-up.    Diagnoses and all orders for this visit:    Class 3 severe obesity due to excess calories with serious comorbidity and body mass index (BMI) of 40.0 to 44.9 in adult (HCC)  -     tirzepatide (Zepbound) 7.5 mg/0.5 mL auto-injector; Inject 0.5 mL (7.5 mg total) under the skin once a week                Total time spent reviewing chart, interviewing patient, examining patient, discussing plan, answering all questions, and documentin minutes with >50% face-to-face time with the patient.            Weight trajectory     Wt Readings from Last 30 Encounters:   25 (!) 149 kg (328 lb 12.8 oz)   25 (!) 159 kg (351 lb)   01/10/25 (!) 159 kg (351 lb 3.2 oz)   25 (!) 161 kg (354 lb 1.6 oz)   12/10/24 (!) 164 kg (361 lb)   24 (!) 161 kg (356 lb)   10/14/24 (!) 161 kg (356 lb)   24 (!) 165 kg (363 lb 6.4 oz)   24 (!) 167 kg (369 lb)    BMI Readings from Last 30 Encounters:   25 43.74 kg/m²   25 47.60 kg/m²   01/10/25 47.63 kg/m²   25 48.02 kg/m²   12/10/24 48.96 kg/m²   24 46.97 kg/m²   10/14/24 46.97 kg/m²   24 47.94 kg/m²   24 48.68 kg/m²                 Lifestyle questionnaire       Diet recall:  B:  apple banana or boiled egg  S: dried " "cranberries  L: cheerios or cranberries  S:  D: veggies and protein potatoes   S: honey nut cheerios         Beverages  Water--64  oz   Caffeine/tea--  oz   SSB --none    Alcohol: no drinks/ week   Smoking: no  Drug use: no    Physical Activity --out of work as he had surgery on elbow    Sleep -- NELY on CPAP 7-8 hours of sleep per night    Occupation-    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  kg/m2 Above 40- Obesity Class III  Difference: -33 lbs      Anti obesity Medications/ Medical---    Weight loss medication and dose: Zepbound   Date initiated: December 2024    Waist circumference (inches): 52 Inches          Objective         The following portions of the patient's history were reviewed and updated as appropriate: allergies, current medications, past family history, past medical history, past social history, past surgical history, and problem list.      /84 (Patient Position: Sitting, Cuff Size: Large)   Pulse 89   Ht 6' 0.7\" (1.847 m)   Wt (!) 149 kg (328 lb 12.8 oz)   BMI 43.74 kg/m²             Review of Systems   Constitutional:  Negative for fatigue.   HENT:  Negative for sore throat.    Respiratory:  Negative for cough and shortness of breath.    Cardiovascular:  Negative for chest pain, palpitations and leg swelling.   Gastrointestinal:  Negative for abdominal pain, constipation, diarrhea and nausea.   Genitourinary:  Negative for dysuria.   Musculoskeletal:  Negative for arthralgias and back pain.   Skin:  Negative for rash.   Neurological:  Negative for headaches.   Psychiatric/Behavioral:  Negative for dysphoric mood. The patient is not nervous/anxious.          Physical Exam  Vitals and nursing note reviewed. "   Constitutional:       Appearance: Normal appearance.   HENT:      Head: Normocephalic.   Pulmonary:      Effort: Pulmonary effort is normal.   Neurological:      General: No focal deficit present.      Mental Status: She is alert and oriented to person, place, and time.   Psychiatric:         Mood and Affect: Mood normal.         Behavior: Behavior normal.         Thought Content: Thought content normal.         Judgment: Judgment normal.          Current medications       Current Outpatient Medications:     aspirin 81 mg chewable tablet, Chew 81 mg daily, Disp: , Rfl:     chlorhexidine (PERIDEX) 0.12 % solution, , Disp: , Rfl:     fluticasone (FLONASE) 50 mcg/act nasal spray, 2 sprays into each nostril daily, Disp: , Rfl:     hydroCHLOROthiazide 12.5 mg capsule, Take 12.5 mg by mouth daily, Disp: , Rfl:     tirzepatide (Zepbound) 7.5 mg/0.5 mL auto-injector, Inject 0.5 mL (7.5 mg total) under the skin once a week, Disp: 2 mL, Rfl: 0    celecoxib (CeleBREX) 200 mg capsule, , Disp: , Rfl:     EPINEPHrine (EPIPEN) 0.3 mg/0.3 mL SOAJ, Inject 0.3 mL (0.3 mg total) into a muscle once for 1 dose (Patient not taking: Reported on 3/17/2025), Disp: 0.6 mL, Rfl: 0         Medication considerations/contraindications     -Patient denies personal history of pancreatitis. Patient also denies personal and family history of medullary thyroid cancer, and multiple endocrine neoplasia type 2 (MEN 2 tumor). -Patient denies any history of kidney stones, seizures, or glaucoma, diabetic retinopathy, gall bladder disease, gastroparesis, hyperthyroidism.  -Denies Hx of CAD, PAD, palpitations, arrhythmia, uncontrolled hypertension  -Denies uncontrolled anxiety or depression, suicidal behavior or thinking , insomnia or sleep disturbance.         Labs     Most recent labs reviewed   Lab Results   Component Value Date    SODIUM 137 11/13/2024    K 5.1 11/13/2024    CL 99 (L) 11/13/2024    CO2 31 11/13/2024    AGAP 7 11/13/2024    BUN 20  "11/13/2024    CREATININE 0.98 11/13/2024    GLUC 95 11/13/2024    CALCIUM 9.7 11/13/2024    AST 12 11/13/2024    ALT 14 11/13/2024    ALKPHOS 85 11/13/2024    TP 7.8 11/13/2024    TBILI 0.5 11/13/2024    EGFR 96 11/13/2024     Lab Results   Component Value Date    HGBA1C 5.9 (H) 11/13/2024     Lab Results   Component Value Date    TSH 0.69 11/08/2022     No results found for: \"CHOLESTEROL\"  No results found for: \"HDL\"  No results found for: \"TRIG\"  No results found for: \"LDLCALC\"  No results found for: \"[id number]\", \"LABINSU\"      "

## 2025-03-17 ENCOUNTER — OFFICE VISIT (OUTPATIENT)
Dept: BARIATRICS | Facility: CLINIC | Age: 45
End: 2025-03-17
Payer: COMMERCIAL

## 2025-03-17 VITALS
BODY MASS INDEX: 41.75 KG/M2 | SYSTOLIC BLOOD PRESSURE: 126 MMHG | HEIGHT: 73 IN | WEIGHT: 315 LBS | DIASTOLIC BLOOD PRESSURE: 84 MMHG | HEART RATE: 89 BPM

## 2025-03-17 DIAGNOSIS — E66.813 CLASS 3 SEVERE OBESITY DUE TO EXCESS CALORIES WITH SERIOUS COMORBIDITY AND BODY MASS INDEX (BMI) OF 40.0 TO 44.9 IN ADULT (HCC): Primary | ICD-10-CM

## 2025-03-17 DIAGNOSIS — E66.01 CLASS 3 SEVERE OBESITY DUE TO EXCESS CALORIES WITH SERIOUS COMORBIDITY AND BODY MASS INDEX (BMI) OF 40.0 TO 44.9 IN ADULT (HCC): Primary | ICD-10-CM

## 2025-03-17 PROCEDURE — 99215 OFFICE O/P EST HI 40 MIN: CPT | Performed by: INTERNAL MEDICINE

## 2025-03-17 RX ORDER — ASPIRIN 81 MG/1
81 TABLET, CHEWABLE ORAL DAILY
COMMUNITY

## 2025-03-17 RX ORDER — TIRZEPATIDE 7.5 MG/.5ML
7.5 INJECTION, SOLUTION SUBCUTANEOUS WEEKLY
Qty: 2 ML | Refills: 0 | Status: SHIPPED | OUTPATIENT
Start: 2025-03-17 | End: 2025-05-12

## 2025-03-17 NOTE — ASSESSMENT & PLAN NOTE
Antiobesity Medications/Medical --was seen at [facility].  Would like to follow-up at [place] as it is closer  Currently on Zepbound 5 mg and he has been on the same dose for 2 months.  Will be starting his third box  Increase the dose to 7.5 mg.  Requested patient to reach out via portal to titrate up to next dose  Will review contraindications to oral medications at follow-up visits  Would consider metformin first-line medication in combination with GLP-1 due to synergistic mechanism of action  Outlined a weight loss goal of at least 1 pound a week  Comorbidities of prediabetes  Will need labs to evaluate for thyroid and cholesterol next office visit      Nutrition:    Reviewed with patient that he would need to increase protein intake and decrease simple carbs such as fruits and dried cranberries which are high in sugar.  Encouraged patient to meet and work with a dietitian-patient declines at this time as he states he knows what he has to eat.    Physical Activity:   Goes to the [facility].   Walks on treadmill   Had elbow surgery 2 weeks ago so unable to do any weights until cleared by surgery.  Has not started physical therapy    Sleep: -  Obstructive sleep apnea on CPAP; adequate sleep duration 8+ hours    Food Behaviors/Stress/pyschosocial:  Wants to use Noom or MFP to guide protein intake  Reports hunger and cravings are adequately suppressed on this dose

## 2025-03-31 ENCOUNTER — TELEPHONE (OUTPATIENT)
Dept: INFECTIOUS DISEASES | Facility: CLINIC | Age: 45
End: 2025-03-31

## 2025-03-31 NOTE — TELEPHONE ENCOUNTER
Called and spoke with patient today.   Reminded patient of upcoming [facility] appointment.     Also reminded patient to bring:  - Itinerary  - Yellow card if patient has one (one will be provided if patient does not have one)  - Updated vaccine records if not already in patients chart  - Reminder that this is a self pay clinic    Patient verbalizes understanding at this time.

## 2025-04-01 ENCOUNTER — OFFICE VISIT (OUTPATIENT)
Dept: INFECTIOUS DISEASES | Facility: CLINIC | Age: 45
End: 2025-04-01

## 2025-04-01 VITALS
BODY MASS INDEX: 43.79 KG/M2 | SYSTOLIC BLOOD PRESSURE: 138 MMHG | OXYGEN SATURATION: 98 % | WEIGHT: 315 LBS | TEMPERATURE: 97.9 F | DIASTOLIC BLOOD PRESSURE: 78 MMHG

## 2025-04-01 DIAGNOSIS — Z71.84 TRAVEL ADVICE ENCOUNTER: Primary | ICD-10-CM

## 2025-04-01 PROCEDURE — 90717 YELLOW FEVER VACCINE SUBQ: CPT

## 2025-04-01 PROCEDURE — 99411 PREVENTIVE COUNSELING GROUP: CPT | Performed by: INTERNAL MEDICINE

## 2025-04-01 RX ORDER — ATOVAQUONE AND PROGUANIL HYDROCHLORIDE 250; 100 MG/1; MG/1
1 TABLET, FILM COATED ORAL
Qty: 24 TABLET | Refills: 1 | Status: SHIPPED | OUTPATIENT
Start: 2025-06-15 | End: 2025-07-09

## 2025-04-01 NOTE — PROGRESS NOTES
Travel Clinic    Patient is traveling to countries or areas within countries where immunizations are required or recommended:   [place]    Patient states they will visit underdeveloped areas with poor sanitation Yes/No: Yes   Patient requires malaria prophylaxis Yes/No: Yes    No orders of the defined types were placed in this encounter.  Wants only Yellow fever vaccine and Malaria prophylaxis, Warned of Yellow Fever Vaccine side effects including death and accepts risks.    Patient instructed how to take medications Yes/No: Yes  Patient warned about side effects Yes/No: Yes  Patient declined Yes/No: No

## 2025-04-04 ENCOUNTER — EVALUATION (OUTPATIENT)
Dept: PHYSICAL THERAPY | Facility: CLINIC | Age: 45
End: 2025-04-04
Payer: OTHER MISCELLANEOUS

## 2025-04-04 DIAGNOSIS — M77.11 LATERAL EPICONDYLITIS OF RIGHT ELBOW: Primary | ICD-10-CM

## 2025-04-04 PROCEDURE — 97110 THERAPEUTIC EXERCISES: CPT

## 2025-04-04 PROCEDURE — 97162 PT EVAL MOD COMPLEX 30 MIN: CPT

## 2025-04-04 NOTE — LETTER
2025    Kamron Pedro MD  [address] [place] [id number]    Patient: Valentin Reid   YOB: 1980   Date of Visit: 2025     Encounter Diagnosis     ICD-10-CM    1. Lateral epicondylitis of right elbow  M77.11           Dear Dr. Kamron Pedro MD:    Thank you for your recent referral of Valentin Reid. Please review the attached evaluation summary from Valentin's recent visit.     Please verify that you agree with the plan of care by signing the attached order.     If you have any questions or concerns, please do not hesitate to call.     I sincerely appreciate the opportunity to share in the care of one of your patients and hope to have another opportunity to work with you in the near future.       Sincerely,    Elvin Tinajero, PT      Referring Provider:      I certify that I have read the below Plan of Care and certify the need for these services furnished under this plan of treatment while under my care.                    Kamorn Pedro MD  [address] [place] [id number]  Via Fax: [phone]          PT Evaluation     Today's date: 2025  Patient name: Valentin Reid  : 1980  MRN: [id number]  Referring provider: Kamron Pedro, *  Dx:   Encounter Diagnosis     ICD-10-CM    1. Lateral epicondylitis of right elbow  M77.11           Start Time: 1300  Stop Time: 1345  Total time in clinic (min): 45 minutes    Assessment  Impairments: abnormal muscle firing, abnormal muscle tone, abnormal or restricted ROM, abnormal movement, impaired physical strength, lacks appropriate home exercise program, pain with function and poor posture     Assessment details: Valentin Reid is a pleasant 44 y.o. male who presents s/p right extensor carpiradialis brevis repair.  The patient's greatest concerns are worry over not knowing what's wrong and fear of not being able to keep active.      No further  referral appears necessary at this time based upon examination results.    Primary movement impairment diagnosis of force deficits resulting in pathoanatomical symptoms of Lateral epicondylitis of right elbow  (primary encounter diagnosis) and limiting his ability to lift heavy objects and screw (tighten/untighten) pipes at work as a .    Impairments include:  1)  strength deficit  2) Wrist extension force deficit  3) Proprioceptive deficits    Etiologic factors include recent heavy lifting.    Discussed risks, benefits, and alternatives to treatment, and answered all patient questions to patient satisfaction.  Understanding of Dx/Px/POC: good     Prognosis: good    Goals  Impairment Goals 4-6 weeks  - Decrease pain to 2/10  - Improve wrist AROM to equal to the unaffected upper extremity  - Increase wrist strength to 5/5 throughout  - Increase scapular strength to 5/5 throughout    Functional Goals 6-8 weeks  - Return to Prior Level of Function  - Patient will be independent with HEP  - Patient will be able to reach overhead without increased pain/compensation/difficulty  - Patient will be able to reach behind back without increased pain/compensation/difficulty   - Patient will be able to wash hair without increased pain/compensation/difficulty   - Patient will be able to lift >25 pounds with proper mechanics        Plan  Patient would benefit from: skilled physical therapy  Planned modality interventions: cryotherapy, TENS, thermotherapy: hydrocollator packs and unattended electrical stimulation    Planned therapy interventions: abdominal trunk stabilization, behavior modification, body mechanics training, breathing training, flexibility, functional ROM exercises, home exercise program, joint mobilization, manual therapy, massage, Mario taping, muscle pump exercises, neuromuscular re-education, patient education, postural training, strengthening, stretching, therapeutic activities, therapeutic  exercise and therapeutic training    Frequency: 2x week  Duration in weeks: 8  Treatment plan discussed with: patient  Plan details: Prognosis above is given PT services 2x/week tapering to 1x/week over the next 2 months and home program adherence.        Subjective Evaluation    History of Present Illness  Mechanism of injury: WORK/SCHOOL:  in [place], heavy twisting   HOME LIFE:   HOBBIES/EXERCISE:   PLOF:    HISTORY OF CURRENT INJURY:  First noticed last year with lifting and wrist extension.  Lasted entire summer, had cortisone shot and felt great for w months then came back when he was using the shovel.  Two weeks was lifting heavy and had significant pain again.  Saw doc and received ECRB repair and scar tissue debridement on .  Was in pain a couple of days.  Presenting to PT eval at end of 6th week (functional phase)  PAIN LOCATION/DESCRIPTORS: Right later elbow  AGGRAVATING FACTORS:  wrist extensors with fingers extended picking things up  EASES: rest  DAY PATTERN:  IMAGING:    SPECIAL QUESTIONS:    Valentin denies a new onset of Tingling and Numbness.  PATIENT GOALS: Get back to work    Patient Goals  Patient goals for therapy: decreased pain, increased strength, return to sport/leisure activities, increased motion, independence with ADLs/IADLs and return to work    Pain  Current pain ratin  At best pain ratin  At worst pain ratin  Quality: sharp and tight          Objective     Observations     Right Elbow   Positive for incision.     Neurological Testing     Sensation     Elbow   Left Elbow   Intact: light touch    Right Elbow   Intact: light touch    Reflexes   Left   Biceps (C5/C6): normal (2+)    Active Range of Motion     Right Elbow   Flexion: WFL  Extension: WFL  Forearm supination: WFL  Forearm pronation: WFL    Strength/Myotome Testing     Right Elbow   Flexion: 4+  Extension: 4+  Forearm supination: 4+  Forearm pronation: 4+    Additional Strength Details  L  " 100lb  Rgrip 25lb              POC Expires Auth Status Start Date Expiration Date PT Visit Limit           Date   4/4     Used 1       Remaining 46          Diagnosis: R ECRB repair   Precautions: n/a   Primary Goals: Wrist ext strength, flexibility, , \"sprint repetitions to fatigue without pain\"  Return to sport week 12 (eval at week 6)   *asterisks by exercise = given for HEP   Manuals 4/4       IASTM NV    DO FOTO                                   There Ex         Strength* x10       Wrist FLX/EXT isotonics* x10       Ext eccentrics * x10                                                       Neuro Re-Ed        Wrist isometrics  Elbow 90* x10       Isometric elbow extension x10                                                                               Patient education Diagnosis, prognosis, activity modification        Re-evaluation              Ther Act                                         Modalities                                                                        "

## 2025-04-04 NOTE — PROGRESS NOTES
PT Evaluation     Today's date: 2025  Patient name: Valentin Reid  : 1980  MRN: [id number]  Referring provider: Kamron Pedro, *  Dx:   Encounter Diagnosis     ICD-10-CM    1. Lateral epicondylitis of right elbow  M77.11           Start Time: 1300  Stop Time: 1345  Total time in clinic (min): 45 minutes    Assessment  Impairments: abnormal muscle firing, abnormal muscle tone, abnormal or restricted ROM, abnormal movement, impaired physical strength, lacks appropriate home exercise program, pain with function and poor posture     Assessment details: Valentin Reid is a pleasant 44 y.o. male who presents s/p right extensor carpiradialis brevis repair.  The patient's greatest concerns are worry over not knowing what's wrong and fear of not being able to keep active.      No further referral appears necessary at this time based upon examination results.    Primary movement impairment diagnosis of force deficits resulting in pathoanatomical symptoms of Lateral epicondylitis of right elbow  (primary encounter diagnosis) and limiting his ability to lift heavy objects and screw (tighten/untighten) pipes at work as a .    Impairments include:  1)  strength deficit  2) Wrist extension force deficit  3) Proprioceptive deficits    Etiologic factors include recent heavy lifting.    Discussed risks, benefits, and alternatives to treatment, and answered all patient questions to patient satisfaction.  Understanding of Dx/Px/POC: good     Prognosis: good    Goals  Impairment Goals 4-6 weeks  - Decrease pain to 2/10  - Improve wrist AROM to equal to the unaffected upper extremity  - Increase wrist strength to 5/5 throughout  - Increase scapular strength to 5/5 throughout    Functional Goals 6-8 weeks  - Return to Prior Level of Function  - Patient will be independent with HEP  - Patient will be able to reach overhead without increased pain/compensation/difficulty  - Patient will be able to reach  behind back without increased pain/compensation/difficulty   - Patient will be able to wash hair without increased pain/compensation/difficulty   - Patient will be able to lift >25 pounds with proper mechanics        Plan  Patient would benefit from: skilled physical therapy  Planned modality interventions: cryotherapy, TENS, thermotherapy: hydrocollator packs and unattended electrical stimulation    Planned therapy interventions: abdominal trunk stabilization, behavior modification, body mechanics training, breathing training, flexibility, functional ROM exercises, home exercise program, joint mobilization, manual therapy, massage, Mario taping, muscle pump exercises, neuromuscular re-education, patient education, postural training, strengthening, stretching, therapeutic activities, therapeutic exercise and therapeutic training    Frequency: 2x week  Duration in weeks: 8  Treatment plan discussed with: patient  Plan details: Prognosis above is given PT services 2x/week tapering to 1x/week over the next 2 months and home program adherence.        Subjective Evaluation    History of Present Illness  Mechanism of injury: WORK/SCHOOL:  in [place], heavy twisting   HOME LIFE:   HOBBIES/EXERCISE:   PLOF:    HISTORY OF CURRENT INJURY:  First noticed last year with lifting and wrist extension.  Lasted entire summer, had cortisone shot and felt great for w months then came back when he was using the shovel.  Two weeks was lifting heavy and had significant pain again.  Saw doc and received ECRB repair and scar tissue debridement on 25th of February.  Was in pain a couple of days.  Presenting to PT eval at end of 6th week (functional phase)  PAIN LOCATION/DESCRIPTORS: Right later elbow  AGGRAVATING FACTORS:  wrist extensors with fingers extended picking things up  EASES: rest  DAY PATTERN:  IMAGING:    SPECIAL QUESTIONS:    Valentin denies a new onset of Tingling and Numbness.  PATIENT GOALS: Get back to  "work    Patient Goals  Patient goals for therapy: decreased pain, increased strength, return to sport/leisure activities, increased motion, independence with ADLs/IADLs and return to work    Pain  Current pain ratin  At best pain ratin  At worst pain ratin  Quality: sharp and tight          Objective     Observations     Right Elbow   Positive for incision.     Neurological Testing     Sensation     Elbow   Left Elbow   Intact: light touch    Right Elbow   Intact: light touch    Reflexes   Left   Biceps (C5/C6): normal (2+)    Active Range of Motion     Right Elbow   Flexion: WFL  Extension: WFL  Forearm supination: WFL  Forearm pronation: WFL    Strength/Myotome Testing     Right Elbow   Flexion: 4+  Extension: 4+  Forearm supination: 4+  Forearm pronation: 4+    Additional Strength Details  L  100lb  Rgrip 25lb              POC Expires Auth Status Start Date Expiration Date PT Visit Limit           Date        Used 1       Remaining 46          Diagnosis: R ECRB repair   Precautions: n/a   Primary Goals: Wrist ext strength, flexibility, , \"sprint repetitions to fatigue without pain\"  Return to sport week 12 (eval at week 6)   *asterisks by exercise = given for HEP   Manuals        IASTM NV    DO FOTO                                   There Ex         Strength* x10       Wrist FLX/EXT isotonics* x10       Ext eccentrics * x10                                                       Neuro Re-Ed        Wrist isometrics  Elbow 90* x10       Isometric elbow extension x10                                                                               Patient education Diagnosis, prognosis, activity modification        Re-evaluation              Ther Act                                         Modalities                                                          "

## 2025-04-08 ENCOUNTER — OFFICE VISIT (OUTPATIENT)
Dept: PHYSICAL THERAPY | Facility: CLINIC | Age: 45
End: 2025-04-08
Payer: OTHER MISCELLANEOUS

## 2025-04-08 DIAGNOSIS — M77.11 LATERAL EPICONDYLITIS OF RIGHT ELBOW: Primary | ICD-10-CM

## 2025-04-08 PROCEDURE — 97530 THERAPEUTIC ACTIVITIES: CPT

## 2025-04-08 PROCEDURE — 97112 NEUROMUSCULAR REEDUCATION: CPT

## 2025-04-08 PROCEDURE — 97140 MANUAL THERAPY 1/> REGIONS: CPT

## 2025-04-08 PROCEDURE — 97110 THERAPEUTIC EXERCISES: CPT

## 2025-04-08 NOTE — PROGRESS NOTES
"Daily Note     Today's date: 2025  Patient name: Valentin Reid  : 1980  MRN: [id number]  Referring provider: Kamron Pedro, *  Dx:   Encounter Diagnosis     ICD-10-CM    1. Lateral epicondylitis of right elbow  M77.11           Start Time: 1130  Stop Time: 1215  Total time in clinic (min): 45 minutes    Subjective: Patient report doing well with HEP since eval.  Reports seeing doc yesterday and having been cleared to lift five pounds with script expectations of 5-7 weeks.        Objective: See treatment diary below      Assessment: Tolerated treatment well.  Warmed up on UBE, gradual isotonic progression up to 4# today, onset of fatigue.  Light-moderate pressure IASTM at muscle belly of ECRB and cross friction scar massage.   Patient demonstrated fatigue post treatment, exhibited good technique with therapeutic exercises, and would benefit from continued PT      Plan: Continue per plan of care.         POC Expires Auth Status Start Date Expiration Date PT Visit Limit           Date        Used 1       Remaining 46          Diagnosis: R ECRB repair   Precautions: n/a   Primary Goals: Wrist ext strength, flexibility, , \"sprint repetitions to fatigue without pain\"  Return to sport week 12 (eval at week 6)   *asterisks by exercise = given for HEP   Manuals       IASTM NV 8 min JW   DO FOTO                                   There Ex         Strength* x10 Black x10      Wrist FLX/EXT isotonics* x10 1# x13  2# x13  3# x13  4# x13      Ext eccentrics * x10       RD UD*                                                Neuro Re-Ed        Wrist isometrics  Elbow 90* x10       Isometric elbow extension x10                                                                               Patient education Diagnosis, prognosis, activity modification        Re-evaluation              Ther Act                                         Modalities                                               "

## 2025-04-09 ENCOUNTER — OFFICE VISIT (OUTPATIENT)
Dept: PHYSICAL THERAPY | Facility: CLINIC | Age: 45
End: 2025-04-09
Payer: OTHER MISCELLANEOUS

## 2025-04-09 DIAGNOSIS — M77.11 LATERAL EPICONDYLITIS OF RIGHT ELBOW: Primary | ICD-10-CM

## 2025-04-09 PROCEDURE — 97110 THERAPEUTIC EXERCISES: CPT

## 2025-04-09 PROCEDURE — 97112 NEUROMUSCULAR REEDUCATION: CPT

## 2025-04-09 PROCEDURE — 97140 MANUAL THERAPY 1/> REGIONS: CPT

## 2025-04-09 PROCEDURE — 97530 THERAPEUTIC ACTIVITIES: CPT

## 2025-04-09 NOTE — PROGRESS NOTES
"Daily Note     Today's date: 2025  Patient name: Valentin Reid  : 1980  MRN: [id number]  Referring provider: Kamron Pedro, *  Dx:   Encounter Diagnosis     ICD-10-CM    1. Lateral epicondylitis of right elbow  M77.11           Start Time: 1100  Stop Time: 1145  Total time in clinic (min): 45 minutes    Subjective: Patient endorses general fatigue with right wrist extensors and  last night after treatment.  \"It feels like I'm waking up muscles\"  Articulates muscle burn, activation, and warm up effect with therapeutic activities.      Objective: See treatment diary below      Assessment: Tolerated treatment well.  Implementing \"sprint repetitions to fatigue\" with low loading at wrist motions per protocol.  Patient demonstrated fatigue post treatment, exhibited good technique with therapeutic exercises, and would benefit from continued PT      Plan: Continue per plan of care.         POC Expires Auth Status Start Date Expiration Date PT Visit Limit           Date        Used 1       Remaining 46          Diagnosis: R ECRB repair   Precautions: n/a   Primary Goals: Wrist ext strength, flexibility, , \"sprint repetitions to fatigue without pain\"  Return to sport week 12 (eval at week 6)   *asterisks by exercise = given for HEP   Manuals      IASTM NV 8 min JW NV  DO FOTO                                   There Ex         Strength* x10 Black x10      Wrist FLX/EXT isotonics* x10 1# x13  2# x13  3# x13  4# x13 NV     Ext eccentrics * x10  X12 Blue     RD UD*   UD x30 blue     Scap stab   (Tb black)  Rows 2x10  T's 2x10  SAPD 2x10                                     Neuro Re-Ed        Wrist isometrics  Elbow 90* x10  X10     Isometric elbow extension x10  X10       Tricep Pull down rope pull apart   15# 2x12                                                                     Patient education Diagnosis, prognosis, activity modification        Re-evaluation              Ther " Act                                         Modalities

## 2025-04-10 ENCOUNTER — OFFICE VISIT (OUTPATIENT)
Dept: PHYSICAL THERAPY | Facility: CLINIC | Age: 45
End: 2025-04-10
Payer: OTHER MISCELLANEOUS

## 2025-04-10 DIAGNOSIS — M77.11 LATERAL EPICONDYLITIS OF RIGHT ELBOW: Primary | ICD-10-CM

## 2025-04-10 PROCEDURE — 97140 MANUAL THERAPY 1/> REGIONS: CPT

## 2025-04-10 PROCEDURE — 97530 THERAPEUTIC ACTIVITIES: CPT

## 2025-04-10 PROCEDURE — 97112 NEUROMUSCULAR REEDUCATION: CPT

## 2025-04-10 PROCEDURE — 97110 THERAPEUTIC EXERCISES: CPT

## 2025-04-10 NOTE — PROGRESS NOTES
"Daily Note     Today's date: 4/10/2025  Patient name: Valentin Reid  : 1980  MRN: [id number]  Referring provider: Kamron Pedro, *  Dx:   Encounter Diagnosis     ICD-10-CM    1. Lateral epicondylitis of right elbow  M77.11           Start Time: 1015  Stop Time: 1100  Total time in clinic (min): 45 minutes    Subjective: Patient reports having some clear discharging from incision site, not maintains that has scabbed over.  Demonstrates possibility of keyloid formation as he was educated by his referring doc.      Objective: See treatment diary below      Assessment: Tolerated treatment well. Incision site is clean with no signs of infection.  Continued eccentric wrist extension isotonics and flexibility training.  Able to perform 5# wrist extension today well, progresses bicep curl to 6# (endorses pull)  Patient demonstrated fatigue post treatment, exhibited good technique with therapeutic exercises, and would benefit from continued PT      Plan: Continue per plan of care.         POC Expires Auth Status Start Date Expiration Date PT Visit Limit           Date        Used 1       Remaining 46          Diagnosis: R ECRB repair   Precautions: n/a   Primary Goals: Wrist ext strength, flexibility, , \"sprint repetitions to fatigue without pain\"  Return to sport week 12 (eval at week 6)   *asterisks by exercise = given for HEP   Manuals 4/4 4/8 4/9 4/10    IASTM NV 8 min JW NV JW 8 min R wrist extensor muscle belly DO FOTO                                   There Ex         Strength* x10 Black x10  Black x10    Wrist FLX/EXT isotonics* x10 1# x13  2# x13  3# x13  4# x13 NV 3# x 2x20  4#  2x20  5# 2x20    Ext eccentrics * x10  X12 Blue X20 green flexbar    RD UD*   UD x30 blue     Scap stab   (Tb black)  Rows 2x10  T's 2x10  SAPD 2x10     Wrist Flexion (wrist ext) S    5x10\"    Bicep curl    5# x20                    Neuro Re-Ed        Wrist isometrics  Elbow 90* x10  X10     Isometric elbow " extension x10  X10       Tricep Pull down rope pull apart   15# 2x12                                                                     Patient education Diagnosis, prognosis, activity modification        Re-evaluation              Ther Act                                         Modalities

## 2025-04-16 ENCOUNTER — OFFICE VISIT (OUTPATIENT)
Dept: PHYSICAL THERAPY | Facility: CLINIC | Age: 45
End: 2025-04-16
Payer: OTHER MISCELLANEOUS

## 2025-04-16 DIAGNOSIS — M77.11 LATERAL EPICONDYLITIS OF RIGHT ELBOW: Primary | ICD-10-CM

## 2025-04-16 PROCEDURE — 97110 THERAPEUTIC EXERCISES: CPT

## 2025-04-16 PROCEDURE — 97140 MANUAL THERAPY 1/> REGIONS: CPT

## 2025-04-16 PROCEDURE — 97112 NEUROMUSCULAR REEDUCATION: CPT

## 2025-04-16 PROCEDURE — 97530 THERAPEUTIC ACTIVITIES: CPT

## 2025-04-16 NOTE — PROGRESS NOTES
"Daily Note     Today's date: 2025  Patient name: Valentin Reid  : 1980  MRN: [id number]  Referring provider: Kamron Pedro, *  Dx:   Encounter Diagnosis     ICD-10-CM    1. 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      Objective: See treatment diary below      Assessment: Tolerated treatment well.  Implemented low load repetitions cueing fast concentric to fatigue today, slight discomfort but able to perform higher volume loading as documented below. Patient demonstrated fatigue post treatment, exhibited good technique with therapeutic exercises, and would benefit from continued PT      Plan: Continue per plan of care.         POC Expires Auth Status Start Date Expiration Date PT Visit Limit           Date        Used 1       Remaining 46          Diagnosis: R ECRB repair   Precautions: n/a   Primary Goals: Wrist ext strength, flexibility, , \"sprint repetitions to fatigue without pain\"  Return to sport week 12 (eval at week 6)   *asterisks by exercise = given for HEP   Manuals 4/4 4/8 4/9 4/10 4/16   IASTM NV 8 min JW NV JW 8 min R wrist extensor muscle belly DO FOTO   Manual Resisted ecc     3x15                           There Ex         Strength* x10 Black x10  Black x10 Blue 5\"x10  X1 min hold   Wrist FLX/EXT isotonics* x10 1# x13  2# x13  3# x13  4# x13 NV 3# x 2x20  4#  2x20  5# 2x20 Ext:  4# (sprint) x63  5# (slow) 2x10  Flex:   5# 2x10 (slow)  (Sprint) x 80   Ext eccentrics * x10  X12 Blue X20 green flexbar X20 green flexbar   RD UD*   UD x30 blue  Green flexbar x 90\" min   Scap stab   (Tb black)  Rows 2x10  T's 2x10  SAPD 2x10  (Tb black)  Rows x 2 min  T's x1 min  SAPD 2x10    Cued fast \"sprint contraction\"   Wrist Flexion (wrist ext) S    5x10\" x20   Bicep curl    5# x20 5# 3x10                   Neuro Re-Ed      "   Wrist isometrics  Elbow 90* x10  X10     Isometric elbow extension x10  X10       Tricep Pull down rope pull apart   15# 2x12                                                                     Patient education Diagnosis, prognosis, activity modification        Re-evaluation              Ther Act                                         Modalities

## 2025-04-17 ENCOUNTER — APPOINTMENT (OUTPATIENT)
Dept: LAB | Facility: CLINIC | Age: 45
End: 2025-04-17
Payer: COMMERCIAL

## 2025-04-17 ENCOUNTER — OFFICE VISIT (OUTPATIENT)
Dept: FAMILY MEDICINE CLINIC | Facility: CLINIC | Age: 45
End: 2025-04-17

## 2025-04-17 VITALS
DIASTOLIC BLOOD PRESSURE: 84 MMHG | SYSTOLIC BLOOD PRESSURE: 124 MMHG | TEMPERATURE: 97.9 F | OXYGEN SATURATION: 99 % | RESPIRATION RATE: 18 BRPM | BODY MASS INDEX: 41.75 KG/M2 | WEIGHT: 315 LBS | HEART RATE: 80 BPM | HEIGHT: 73 IN

## 2025-04-17 DIAGNOSIS — T78.40XS ALLERGY, SEQUELA: ICD-10-CM

## 2025-04-17 DIAGNOSIS — Z12.11 COLON CANCER SCREENING: ICD-10-CM

## 2025-04-17 DIAGNOSIS — E66.813 CLASS 3 SEVERE OBESITY DUE TO EXCESS CALORIES WITH SERIOUS COMORBIDITY AND BODY MASS INDEX (BMI) OF 40.0 TO 44.9 IN ADULT: ICD-10-CM

## 2025-04-17 DIAGNOSIS — I10 PRIMARY HYPERTENSION: ICD-10-CM

## 2025-04-17 DIAGNOSIS — I10 PRIMARY HYPERTENSION: Primary | ICD-10-CM

## 2025-04-17 DIAGNOSIS — F41.0 PANIC ATTACK: ICD-10-CM

## 2025-04-17 DIAGNOSIS — G47.33 MODERATE OBSTRUCTIVE SLEEP APNEA: ICD-10-CM

## 2025-04-17 LAB
ALBUMIN SERPL BCG-MCNC: 4 G/DL (ref 3.5–5)
ALP SERPL-CCNC: 85 U/L (ref 34–104)
ALT SERPL W P-5'-P-CCNC: 20 U/L (ref 7–52)
ANION GAP SERPL CALCULATED.3IONS-SCNC: 9 MMOL/L (ref 4–13)
AST SERPL W P-5'-P-CCNC: 18 U/L (ref 13–39)
BASOPHILS # BLD AUTO: 0.01 THOUSANDS/ÂΜL (ref 0–0.1)
BASOPHILS NFR BLD AUTO: 0 % (ref 0–1)
BILIRUB SERPL-MCNC: 0.49 MG/DL (ref 0.2–1)
BUN SERPL-MCNC: 11 MG/DL (ref 5–25)
CALCIUM SERPL-MCNC: 9.2 MG/DL (ref 8.4–10.2)
CHLORIDE SERPL-SCNC: 100 MMOL/L (ref 96–108)
CO2 SERPL-SCNC: 28 MMOL/L (ref 21–32)
CREAT SERPL-MCNC: 0.77 MG/DL (ref 0.6–1.3)
EOSINOPHIL # BLD AUTO: 0.1 THOUSAND/ÂΜL (ref 0–0.61)
EOSINOPHIL NFR BLD AUTO: 2 % (ref 0–6)
ERYTHROCYTE [DISTWIDTH] IN BLOOD BY AUTOMATED COUNT: 15.4 % (ref 11.6–15.1)
GFR SERPL CREATININE-BSD FRML MDRD: 108 ML/MIN/1.73SQ M
GLUCOSE P FAST SERPL-MCNC: 87 MG/DL (ref 65–99)
HCT VFR BLD AUTO: 40.4 % (ref 36.5–49.3)
HGB BLD-MCNC: 13 G/DL (ref 12–17)
IMM GRANULOCYTES # BLD AUTO: 0.01 THOUSAND/UL (ref 0–0.2)
IMM GRANULOCYTES NFR BLD AUTO: 0 % (ref 0–2)
LYMPHOCYTES # BLD AUTO: 2.93 THOUSANDS/ÂΜL (ref 0.6–4.47)
LYMPHOCYTES NFR BLD AUTO: 50 % (ref 14–44)
MCH RBC QN AUTO: 27.2 PG (ref 26.8–34.3)
MCHC RBC AUTO-ENTMCNC: 32.2 G/DL (ref 31.4–37.4)
MCV RBC AUTO: 85 FL (ref 82–98)
MONOCYTES # BLD AUTO: 0.4 THOUSAND/ÂΜL (ref 0.17–1.22)
MONOCYTES NFR BLD AUTO: 7 % (ref 4–12)
NEUTROPHILS # BLD AUTO: 2.37 THOUSANDS/ÂΜL (ref 1.85–7.62)
NEUTS SEG NFR BLD AUTO: 41 % (ref 43–75)
NRBC BLD AUTO-RTO: 0 /100 WBCS
PLATELET # BLD AUTO: 200 THOUSANDS/UL (ref 149–390)
PMV BLD AUTO: 10.7 FL (ref 8.9–12.7)
POTASSIUM SERPL-SCNC: 3.7 MMOL/L (ref 3.5–5.3)
PROT SERPL-MCNC: 7.7 G/DL (ref 6.4–8.4)
RBC # BLD AUTO: 4.78 MILLION/UL (ref 3.88–5.62)
SODIUM SERPL-SCNC: 137 MMOL/L (ref 135–147)
WBC # BLD AUTO: 5.82 THOUSAND/UL (ref 4.31–10.16)

## 2025-04-17 PROCEDURE — 80053 COMPREHEN METABOLIC PANEL: CPT

## 2025-04-17 PROCEDURE — 36415 COLL VENOUS BLD VENIPUNCTURE: CPT

## 2025-04-17 PROCEDURE — 99214 OFFICE O/P EST MOD 30 MIN: CPT | Performed by: FAMILY MEDICINE

## 2025-04-17 PROCEDURE — 85025 COMPLETE CBC W/AUTO DIFF WBC: CPT

## 2025-04-17 RX ORDER — EPINEPHRINE 0.3 MG/.3ML
0.3 INJECTION SUBCUTANEOUS ONCE
Qty: 0.6 ML | Refills: 0 | Status: SHIPPED | OUTPATIENT
Start: 2025-04-17 | End: 2025-04-17

## 2025-04-17 NOTE — ASSESSMENT & PLAN NOTE
Reports history of anxiety and likely some panic attacks  Reports improvement in the frequency   Reports that it might be related to anxiousness prior to exercising or if he has a lot to do   Advice him to put less in his to do list in order to decrease the anxiety about thinking about it   Checking therapist that his insurance might cover   Given his line of work, he is hesitant to start medication that can make him drowsy  Will reevaluate in a few weeks to see if there are improvement

## 2025-04-17 NOTE — ASSESSMENT & PLAN NOTE
Loss about 23 lbs since his last visit here, he is congratulated on it  Is doing well on zepbound and follwos with weight management, continue

## 2025-04-17 NOTE — ASSESSMENT & PLAN NOTE
Well manage with hydrochlorothiazide 12.5mg daily, continue     Orders:    Comprehensive metabolic panel; Future    CBC and differential; Future

## 2025-04-17 NOTE — PATIENT INSTRUCTIONS
"Lifestyle Medicine Tip Sheet    Eat predominantly less processed foods such as fast food, T.V. dinners, and alberto.    Eat Close to Nature ([employer], Fresh or Frozen Produce)    Eat a predominantly plant based diet   Dark Leafy Greens  Fruits/Vegetables  Whole Grains: Whole wheat, barely, wheat berries, quinoa, steel cut oats, brown rice, whole wheat pasta.   Legumes: kidney beans, blakely beans, white beans, black beans, garbanzo beans (chickpeas), lima beans (mature, dried), split peas, lentils, and edamame (green soybeans)      At least half of the plate should contain fruits or vegetables        Liquid should be predominantly water  (limit soda and juice)    Watch portion size.     Foods you should avoid or limit?  Fats - Specifically saturated and trans-fats. They are found in margarines, many fast foods, and some store-bought baked goods. Saturated and Trans-fats can raise your cholesterol level and your chance of getting heart disease.   When you cook, it's best to use no oils but if needed try to limit the amount of oil used as oil contains many calories per volume and is very unhealthy when heated during cooking.   Sugar -Limit or avoid sugar, sweets, and refined grains. Refined grains are found in white bread, white rice, most forms of pasta, and most packaged \"snack\" foods.   Try not to cook with salt and avoid  adding extra salt to your  meals   Meat - Studies have shown that eating a lot of red meat and poultry can increase your risk of certain health problems, including heart disease, diabetes, obesity and cancer. So try to limit the intake of it.    Practice good sleep hygiene by getting 7-9 hours of sleep a night    Daily exercise minimum of 30 minutes (walking around the block)    Socialization (friends and family)   Explore your neighborhood. Go to the park, spend time at the library  Consider taking a class or volunteering to connect with new people    If you are interested you can read more " about healthy food choices at the following websites:  Nutritionfacts.org  Home cooking recipes: https://[email or web address]/nutritionsource/recipes-2/home-cooking/  [email or web address]/nutritionsource/  Familydoctor.org

## 2025-04-17 NOTE — PROGRESS NOTES
Name: Valentin Reid      : 1980      MRN: [id number]  Encounter Provider: Rachel Eugene MD  Encounter Date: 2025   Encounter department: [facility]  :  Assessment & Plan  Primary hypertension  Well manage with hydrochlorothiazide 12.5mg daily, continue     Orders:    Comprehensive metabolic panel; Future    CBC and differential; Future    Allergy, sequela  History of allergy, needed refill of epipen, refill given  Orders:    EPINEPHrine (EPIPEN) 0.3 mg/0.3 mL SOAJ; Inject 0.3 mL (0.3 mg total) into a muscle once for 1 dose    Colon cancer screening  Cologuard negative in   Report a normal colonoscopy in 2019, can't find record, will refer to GI for evaluation of colonoscopy if needed   Orders:    Ambulatory Referral to Gastroenterology; Future    Panic attack  Reports history of anxiety and likely some panic attacks  Reports improvement in the frequency   Reports that it might be related to anxiousness prior to exercising or if he has a lot to do   Advice him to put less in his to do list in order to decrease the anxiety about thinking about it   Checking therapist that his insurance might cover   Given his line of work, he is hesitant to start medication that can make him drowsy  Will reevaluate in a few weeks to see if there are improvement          Moderate obstructive sleep apnea  On CPAP, continue          Class 3 severe obesity due to excess calories with serious comorbidity and body mass index (BMI) of 40.0 to 44.9 in adult (HCC)  Loss about 23 lbs since his last visit here, he is congratulated on it  Is doing well on zepbound and follwos with weight management, continue                 History of Present Illness   HPI  This is a very pleasant 44 y.o. male who presents to the clinic for management of their chronic medical conditions.  Patient's medical conditions are stable unless noted otherwise above.  Patient has not had any recent  "hospitalizations, or medical emergencies since last visit.  Patient has no further complaints other than what is mentioned in the ROS.    Review of Systems   Constitutional:  Negative for diaphoresis.   HENT:  Negative for hearing loss, rhinorrhea and sinus pressure.    Respiratory:  Negative for chest tightness and shortness of breath.        Objective   /84 (BP Location: Left arm, Patient Position: Sitting, Cuff Size: Large)   Pulse 80   Temp 97.9 °F (36.6 °C) (Temporal)   Resp 18   Ht 6' 1\" (1.854 m)   Wt (!) 150 kg (331 lb)   SpO2 99%   BMI 43.67 kg/m²      Physical Exam  Constitutional:       Appearance: Normal appearance.   Cardiovascular:      Heart sounds: Normal heart sounds.   Pulmonary:      Effort: Pulmonary effort is normal.      Breath sounds: Normal breath sounds.   Neurological:      Mental Status: He is alert and oriented to person, place, and time.         "

## 2025-04-18 ENCOUNTER — RESULTS FOLLOW-UP (OUTPATIENT)
Dept: FAMILY MEDICINE CLINIC | Facility: CLINIC | Age: 45
End: 2025-04-18

## 2025-04-18 ENCOUNTER — OFFICE VISIT (OUTPATIENT)
Dept: PHYSICAL THERAPY | Facility: CLINIC | Age: 45
End: 2025-04-18
Payer: OTHER MISCELLANEOUS

## 2025-04-18 DIAGNOSIS — M77.11 LATERAL EPICONDYLITIS OF RIGHT ELBOW: Primary | ICD-10-CM

## 2025-04-18 PROCEDURE — 97530 THERAPEUTIC ACTIVITIES: CPT

## 2025-04-18 PROCEDURE — 97112 NEUROMUSCULAR REEDUCATION: CPT

## 2025-04-18 PROCEDURE — 97110 THERAPEUTIC EXERCISES: CPT

## 2025-04-18 PROCEDURE — 97140 MANUAL THERAPY 1/> REGIONS: CPT

## 2025-04-18 NOTE — PROGRESS NOTES
"Daily Note     Today's date: 2025  Patient name: Valentin Reid  : 1980  MRN: [id number]  Referring provider: Kamron Pedro, *  Dx: No diagnosis found.               Subjective: Reports a little delayed onset muscle soreness since last visit.  Reports discomfort when opening doors as he feel the stretch in his wrist extensors (gestures ER motion with arm while reporting this).      Objective: See treatment diary below      Assessment: Tolerated treatment well.  As per subjective, session focused on loading proximal muscles and gentle activation of wrist flexors/extensor with instrument assisted massage to wrist flexor. Good toleration and endorses muscle activation with new glenohumeral rotary isotonics (see under scap stab \"ER/IR 0 deg Blue\"; performed these reps for as many as possible per protocol \"AMRAP\".  Patient demonstrated fatigue post treatment, exhibited good technique with therapeutic exercises, and would benefit from continued PT.      Plan: Continue per plan of care.         POC Expires Auth Status Start Date Expiration Date PT Visit Limit           Date        Used 1       Remaining 46          Diagnosis: R ECRB repair   Precautions: n/a   Primary Goals: Wrist ext strength, flexibility, , \"sprint repetitions to fatigue without pain\"  Return to sport week 12 (eval at week 6)   *asterisks by exercise = given for HEP   Manuals 4/18 4/8 4/9 4/10 4/16   IASTM JW wrist flexors 8 min JW NV JW 8 min R wrist extensor muscle belly DO FOTO   Manual Resisted ecc     3x15                           There Ex         Strength* Towel Squeezes x20 Black x10  Black x10 Blue 5\"x10  X1 min hold   Wrist FLX/EXT isotonics* Tb blue x20ea  1# x13  2# x13  3# x13  4# x13 NV 3# x 2x20  4#  2x20  5# 2x20 Ext:  4# (sprint) x63  5# (slow) 2x10  Flex:   5# 2x10 (slow)  (Sprint) x 80   Ext eccentrics *   X12 Blue X20 green flexbar X20 green flexbar   RD UD*   UD x30 blue  Green flexbar x 90\" min " "  Scap stab AMRAP x2 ea  ER 0 deg, blue  IR 0 deg 10#  (Tb black)  Rows 2x10  T's 2x10  SAPD 2x10  (Tb black)  Rows x 2 min  T's x1 min  SAPD 2x10    Cued fast \"sprint contraction\"   Wrist Flexion (wrist ext) S    5x10\" x20   Bicep curl    5# x20 5# 3x10                   Neuro Re-Ed        Wrist isometrics  Elbow 90*   X10     Isometric elbow extension   X10       Tricep Pull down rope pull apart   15# 2x12                                                                     Patient education         Re-evaluation             Ther Act                                      Modalities                                                               "

## 2025-04-22 ENCOUNTER — TELEPHONE (OUTPATIENT)
Age: 45
End: 2025-04-22

## 2025-04-22 ENCOUNTER — PREP FOR PROCEDURE (OUTPATIENT)
Age: 45
End: 2025-04-22

## 2025-04-22 ENCOUNTER — TELEPHONE (OUTPATIENT)
Dept: FAMILY MEDICINE CLINIC | Facility: CLINIC | Age: 45
End: 2025-04-22

## 2025-04-22 DIAGNOSIS — Z12.11 SPECIAL SCREENING FOR MALIGNANT NEOPLASMS, COLON: Primary | ICD-10-CM

## 2025-04-22 DIAGNOSIS — E66.813 CLASS 3 SEVERE OBESITY DUE TO EXCESS CALORIES WITH SERIOUS COMORBIDITY AND BODY MASS INDEX (BMI) OF 40.0 TO 44.9 IN ADULT: Primary | ICD-10-CM

## 2025-04-22 DIAGNOSIS — E66.813 CLASS 3 SEVERE OBESITY DUE TO EXCESS CALORIES WITH SERIOUS COMORBIDITY AND BODY MASS INDEX (BMI) OF 40.0 TO 44.9 IN ADULT: ICD-10-CM

## 2025-04-22 RX ORDER — TIRZEPATIDE 7.5 MG/.5ML
7.5 INJECTION, SOLUTION SUBCUTANEOUS WEEKLY
Qty: 2 ML | Refills: 0 | OUTPATIENT
Start: 2025-04-22 | End: 2025-06-17

## 2025-04-22 RX ORDER — TIRZEPATIDE 10 MG/.5ML
10 INJECTION, SOLUTION SUBCUTANEOUS WEEKLY
Qty: 2 ML | Refills: 2 | Status: SHIPPED | OUTPATIENT
Start: 2025-04-22 | End: 2025-07-15

## 2025-04-22 NOTE — TELEPHONE ENCOUNTER
silvia  Yesterday   08:32 AM      [id number]:39 AM  Evita Funez  Open Note  Print Note  Print Text  Note    Type:General        pt prefers any day any time, is aware appt will be on My Chart, Eh    04/21/25  Screened by: Evita Funez     Referring Provider MONIK FRANCISCO      Pre- Screening:      Body mass index is 43.67 kg/m².  Has patient been referred for a routine screening Colonoscopy? yes  Is the patient between 45-75 years old? yes        Previous Colonoscopy yes   If yes:    Date: 4510-1162    [facility]ility: [facility]    Reason: Routine        SCHEDULING STAFF: If the patient is between 45yrs-49yrs, please advise patient to confirm benefits/coverage with their insurance company for a routine screening colonoscopy, some insurance carriers will only cover at 50yrs or older. If the patient is over 75years old, please schedule an office visit.      Does the patient want to see a Gastroenterologist prior to their procedure OR are they having any GI symptoms? no     Has the patient been hospitalized or had abdominal surgery in the past 6 months? no     Does the patient use supplemental oxygen? no     Does the patient take Coumadin, Lovenox, Plavix, Elliquis, Xarelto, or other blood thinning medication? no     Has the patient had a stroke, cardiac event, or stent placed in the past year? no     SCHEDULING STAFF: If patient answers NO to above questions, then schedule procedure. If patient answers YES to above questions, then schedule office appointment.      If patient is between 45yrs - 49yrs, please advise patient that we will have to confirm benefits & coverage with their insurance company for a routine screening colonoscopy.

## 2025-04-22 NOTE — TELEPHONE ENCOUNTER
Scheduled date of colonoscopy (as of today): 05/02/2025  Physician performing colonoscopy: Dr. Amin  Location of colonoscopy: [facility]  Bowel prep reviewed with patient: FIGUEROA/TIP  Instructions reviewed with patient by: Cassie via telephone with [facility]. Procedure directions sent via [place].  Clearances:

## 2025-04-22 NOTE — TELEPHONE ENCOUNTER
Fax received from [facility] for Zepbound 7.5MG    MyChart message sent with appropriate questions regarding the start of Zepbound dose 7.5MG

## 2025-04-23 ENCOUNTER — APPOINTMENT (OUTPATIENT)
Dept: PHYSICAL THERAPY | Facility: CLINIC | Age: 45
End: 2025-04-23
Payer: OTHER MISCELLANEOUS

## 2025-04-23 ENCOUNTER — OFFICE VISIT (OUTPATIENT)
Dept: PHYSICAL THERAPY | Facility: CLINIC | Age: 45
End: 2025-04-23
Attending: ORTHOPAEDIC SURGERY
Payer: OTHER MISCELLANEOUS

## 2025-04-23 DIAGNOSIS — M77.11 LATERAL EPICONDYLITIS OF RIGHT ELBOW: Primary | ICD-10-CM

## 2025-04-23 PROCEDURE — 97112 NEUROMUSCULAR REEDUCATION: CPT

## 2025-04-23 PROCEDURE — 97140 MANUAL THERAPY 1/> REGIONS: CPT

## 2025-04-23 PROCEDURE — 97110 THERAPEUTIC EXERCISES: CPT

## 2025-04-23 PROCEDURE — 97530 THERAPEUTIC ACTIVITIES: CPT

## 2025-04-23 NOTE — PROGRESS NOTES
"Daily Note     Today's date: 2025  Patient name: Valentin Reid  : 1980  MRN: [id number]  Referring provider: Kamron Pedro, *  Dx:   Encounter Diagnosis     ICD-10-CM    1. Lateral epicondylitis of right elbow  M77.11           Start Time: 1530  Stop Time: 1615  Total time in clinic (min): 45 minutes    Subjective: Reports some numbness and tingling into the dorsal forearm and wrist.  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      Objective: See treatment diary below      Assessment: Tolerated treatment well.  Per subjective, started with nerve glides (ulnar, median, radial) then participated gentle isotonics for wrist extensor.   Did really well with high volume repetitions for wrist curls today,  Unreadily fatigued with ample repetitions (100 reps) for progressing weight scheme.  Patient demonstrated fatigue post treatment and exhibited good technique with therapeutic exercises      Plan: Continue per plan of care.         POC Expires Auth Status Start Date Expiration Date PT Visit Limit           Date        Used 1       Remaining 46          Diagnosis: R ECRB repair   Precautions: n/a   Primary Goals: Wrist ext strength, flexibility, , \"sprint repetitions to fatigue without pain\"  Return to sport week 12 (eval at week 6)   *asterisks by exercise = given for HEP   Manuals 4/18 4/23 4/9 4/10 4/16   IASTM JW wrist flexors 5 min JW NV JW 8 min R wrist extensor muscle belly DO FOTO   Manual Resisted ecc     3x15                           There Ex         Strength* Towel Squeezes x20   Black x10 Blue 5\"x10  X1 min hold   Wrist FLX/EXT isotonics* Tb blue x20ea   NV 3# x 2x20  4#  2x20  5# 2x20 Ext:  4# (sprint) x63  5# (slow) 2x10  Flex:   5# 2x10 (slow)  (Sprint) x 80   Ext eccentrics *  X10 blue tb horizontal X12 Blue X20 green flexbar X20 green flexbar   RD UD*   UD x30 blue  Green flexbar x 90\" min   Scap stab AMRAP x2 ea  ER 0 deg, blue  IR 0 deg 10#  (Tb black)  Rows " "2x10  T's 2x10  SAPD 2x10  (Tb black)  Rows x 2 min  T's x1 min  SAPD 2x10    Cued fast \"sprint contraction\"   Wrist Flexion (wrist ext) S  AMRAP  2-3-4-5 # ea  5x10\" x20   Bicep curl    5# x20 5# 3x10   TB wrist ext isometrics (horizontal)  X10 blue               Neuro Re-Ed        Wrist isometrics  Elbow 90*   X10     Isometric elbow extension   X10       Tricep Pull down rope pull apart   15# 2x12     Nerve Glides (ulnar, median, radial)  x10                                                              Patient education         Re-evaluation            Ther Act                                   Modalities                                                               "

## 2025-04-25 ENCOUNTER — OFFICE VISIT (OUTPATIENT)
Dept: PHYSICAL THERAPY | Facility: CLINIC | Age: 45
End: 2025-04-25
Payer: OTHER MISCELLANEOUS

## 2025-04-25 DIAGNOSIS — M77.11 LATERAL EPICONDYLITIS OF RIGHT ELBOW: Primary | ICD-10-CM

## 2025-04-25 PROCEDURE — 97112 NEUROMUSCULAR REEDUCATION: CPT

## 2025-04-25 PROCEDURE — 97110 THERAPEUTIC EXERCISES: CPT

## 2025-04-25 PROCEDURE — 97140 MANUAL THERAPY 1/> REGIONS: CPT

## 2025-04-25 PROCEDURE — 97530 THERAPEUTIC ACTIVITIES: CPT

## 2025-04-25 NOTE — PROGRESS NOTES
"Daily Note     Today's date: 2025  Patient name: Valentin Reid  : 1980  MRN: [id number]  Referring provider: Kamron Pedro, *  Dx:   Encounter Diagnosis     ICD-10-CM    1. Lateral epicondylitis of right elbow  M77.11           Start Time: 0815  Stop Time: 0900  Total time in clinic (min): 45 minutes    Subjective: Reports some pain and soreness since last session.      Objective: See treatment diary below      Assessment: Tolerated treatment well. Per c/o of complaint session focused on gentle arom, isometrics, and proxima muscle training.  Patient demonstrated fatigue post treatment, exhibited good technique with therapeutic exercises, and would benefit from continued PT      Plan: Continue per plan of care.         POC Expires Auth Status Start Date Expiration Date PT Visit Limit           Date        Used 1       Remaining 46          Diagnosis: R ECRB repair   Precautions: n/a   Primary Goals: Wrist ext strength, flexibility, , \"sprint repetitions to fatigue without pain\"  Return to sport week 12 (eval at week 6)   *asterisks by exercise = given for HEP   Manuals 4/18 4/23 4/25 4/10 4/16   IASTM JW wrist flexors 5 min JW 5 Min JW JW 8 min R wrist extensor muscle belly DO FOTO   Manual Resisted ecc     3x15                           There Ex         Strength* Towel Squeezes x20   Black x10 Blue 5\"x10  X1 min hold   Wrist FLX/EXT isotonics* Tb blue x20ea   AROM 2x13 3# x 2x20  4#  2x20  5# 2x20 Ext:  4# (sprint) x63  5# (slow) 2x10  Flex:   5# 2x10 (slow)  (Sprint) x 80   Ext eccentrics *  X10 blue tb horizontal  X20 green flexbar X20 green flexbar   RD UD*     Green flexbar x 90\" min   Scap stab AMRAP x2 ea  ER 0 deg, blue  IR 0 deg 10#  (Tb black)  Rows 2x10  SAPD 2x10  (Tb black)  Rows x 2 min  T's x1 min  SAPD 2x10    Cued fast \"sprint contraction\"   Wrist Flexion (wrist ext) S  AMRAP  2-3-4-5 # ea AROM 2x13 5x10\" x20   Bicep curl    5# x20 5# 3x10   TB wrist ext " isometrics (horizontal)  X10 blue               Neuro Re-Ed        Wrist isometrics  Elbow 90*   X10     Isometric elbow extension   X10       Tricep Pull down rope pull apart   15# 2x12     Nerve Glides (ulnar, median, radial)  x10 x10                                                             Patient education         Re-evaluation            Ther Act                                   Modalities

## 2025-04-29 ENCOUNTER — OFFICE VISIT (OUTPATIENT)
Dept: PHYSICAL THERAPY | Facility: CLINIC | Age: 45
End: 2025-04-29
Payer: OTHER MISCELLANEOUS

## 2025-04-29 DIAGNOSIS — M77.11 LATERAL EPICONDYLITIS OF RIGHT ELBOW: Primary | ICD-10-CM

## 2025-04-29 DIAGNOSIS — Z12.11 SCREENING FOR COLON CANCER: Primary | ICD-10-CM

## 2025-04-29 PROCEDURE — 97530 THERAPEUTIC ACTIVITIES: CPT

## 2025-04-29 PROCEDURE — 97112 NEUROMUSCULAR REEDUCATION: CPT

## 2025-04-29 PROCEDURE — 97110 THERAPEUTIC EXERCISES: CPT

## 2025-04-29 NOTE — PROGRESS NOTES
"Daily Note     Today's date: 2025  Patient name: Valentin Reid  : 1980  MRN: [id number]  Referring provider: Kamron Pedro, *  Dx:   Encounter Diagnosis     ICD-10-CM    1. Lateral epicondylitis of right elbow  M77.11                      Subjective: Patient feels good, can feel tension with therapeutic activities but improves after first rep.  Feels some discomft with reaching out to the right to grab a weight.  Remembers sup-pron flexbar therapeutic activtive general motion to be very painful before debridgment.      Objective: See treatment diary below      Assessment: Tolerated treatment well.  Able to progress UE training to include functional reaching and overhead movement without pain.  Good tolerance to extended repetitions to fatigue without pain.   Patient demonstrated fatigue post treatment, exhibited good technique with therapeutic exercises, and would benefit from continued PT      Plan: Continue per plan of care.         POC Expires Auth Status Start Date Expiration Date PT Visit Limit           Date        Used 1       Remaining 46          Diagnosis: R ECRB repair   Precautions: n/a   Primary Goals: Wrist ext strength, flexibility, , \"sprint repetitions to fatigue without pain\"  Return to sport week 12 (eval at week 6)   *asterisks by exercise = given for HEP   Manuals    IASTM JW wrist flexors 5 min JW 5 Min JW  DO FOTO   Manual Resisted ecc     3x15                           There Ex         Strength* Towel Squeezes x20    Blue 5\"x10  X1 min hold   Wrist FLX/EXT isotonics* Tb blue x20ea   AROM 2x13 Ext 4# x100 Ext:  4# (sprint) x63  5# (slow) 2x10  Flex:   5# 2x10 (slow)  (Sprint) x 80   Ext eccentrics *  X10 blue tb horizontal   X20 green flexbar   RD UD*     Green flexbar x 90\" min   Scap stab AMRAP x2 ea  ER 0 deg, blue  IR 0 deg 10#  (Tb black)  Rows 2x10  SAPD 2x10  (Tb black)  Rows x 2 min  T's x1 min  SAPD 2x10    Cued fast \"sprint " "contraction\"   Wrist Flexion (wrist ext) S  AMRAP  2-3-4-5 # ea AROM 2x13 10\"x5 ea   x20   Bicep curl     5# 3x10   TB wrist ext isometrics (horizontal)  X10 blue       Towel  squeeze with tb resisted abduction    X8 per dir    Neuro Re-Ed        Wrist isometrics  Elbow 90*   X10     Isometric elbow extension   X10       Tricep Pull down rope pull apart   15# 2x12     Nerve Glides (ulnar, median, radial)  x10 x10     Flex bar    RD-UD 1 min  Sup-Pron 1 min                                                    Patient education         Re-evaluation            Ther Act            Curl + OH Press      6# 2x10     Seated ER at 90      6# 2x15     Modalities                                                                   "

## 2025-04-29 NOTE — TELEPHONE ENCOUNTER
[facility] is requesting that Miralax and Dulcolax please be sent into pt's pharmacy. Can this please be done? Thank you so much for  your help!

## 2025-04-29 NOTE — TELEPHONE ENCOUNTER
Pt calling to find prep instructions taylor/dul, reviewed and pt requesting miralax, dulcolax to pharmacy on file.

## 2025-04-29 NOTE — TELEPHONE ENCOUNTER
Called and spoke to pt and informed of Golytely prep.  Will be sending instructions via my chart per pt's request.  Please send Golytely and Dulcolax tablets into pt's pharmacy. Thank you so much for your help!

## 2025-04-30 ENCOUNTER — OFFICE VISIT (OUTPATIENT)
Dept: PHYSICAL THERAPY | Facility: CLINIC | Age: 45
End: 2025-04-30
Attending: ORTHOPAEDIC SURGERY
Payer: OTHER MISCELLANEOUS

## 2025-04-30 DIAGNOSIS — M77.11 LATERAL EPICONDYLITIS OF RIGHT ELBOW: Primary | ICD-10-CM

## 2025-04-30 PROCEDURE — 97530 THERAPEUTIC ACTIVITIES: CPT

## 2025-04-30 PROCEDURE — 97110 THERAPEUTIC EXERCISES: CPT

## 2025-04-30 PROCEDURE — 97112 NEUROMUSCULAR REEDUCATION: CPT

## 2025-04-30 PROCEDURE — 97140 MANUAL THERAPY 1/> REGIONS: CPT

## 2025-04-30 NOTE — PROGRESS NOTES
"Daily Note     Today's date: 2025  Patient name: Valentin Reid  : 1980  MRN: [id number]  Referring provider: Kamron Pedro, *  Dx:   Encounter Diagnosis     ICD-10-CM    1. Lateral epicondylitis of right elbow  M77.11           Start Time: 0845  Stop Time: 09  Total time in clinic (min): 45 minutes    Subjective: Patient feels a little sore from being busy yesterday.        Objective: See treatment diary below      Assessment: Tolerated treatment well. Modified session 2/2 soreness to promote myofascial manuals, isometrics, and gentle arom. Patient demonstrated fatigue post treatment, exhibited good technique with therapeutic exercises, and would benefit from continued PT      Plan: Continue per plan of care.         POC Expires Auth Status Start Date Expiration Date PT Visit Limit           Date        Used 1       Remaining 46          Diagnosis: R ECRB repair   Precautions: n/a   Primary Goals: Wrist ext strength, flexibility, , \"sprint repetitions to fatigue without pain\"  Return to sport week 12 (eval at week 6)   *asterisks by exercise = given for HEP   Manuals    IASTM JW wrist flexors 5 min JW 5 Min JW  DO FOTO  Right ECRB JW 7 min   Manual Resisted ecc                                There Ex         Strength* Towel Squeezes x20       Wrist FLX/EXT isotonics* Tb blue x20ea   AROM 2x13 Ext 4# x100    Ext eccentrics *  X10 blue tb horizontal      RD UD*        Scap stab AMRAP x2 ea  ER 0 deg, blue  IR 0 deg 10#  (Tb black)  Rows 2x10  SAPD 2x10     Wrist Flexion (wrist ext) S  AMRAP  2-3-4-5 # ea AROM 2x13 10\"x5 ea      Bicep curl        TB wrist ext isometrics (horizontal)  X10 blue       Towel  squeeze with tb resisted abduction    X8 per dir    Neuro Re-Ed        Wrist isometrics  Elbow 90*   X10     Isometric elbow extension   X10       Tricep Pull down rope pull apart   15# 2x12     Nerve Glides (ulnar, median, radial)  x10 x10     Flex " "bar    RD-UD 1 min  Sup-Pron 1 min    Hammer AROM sup-pro / rd-ud     X20 ea   Isometrics     5\"x10 ea flx/ext   AROM     Flx/ext x20 ea   TrX rows    x5                    Patient education         Re-evaluation            Ther Act            Curl + OH Press      6# 2x10     Seated ER at 90      6# 2x15     Modalities                                                                     "

## 2025-04-30 NOTE — TELEPHONE ENCOUNTER
Patient called in the Pharmacy did not receive Rx for Dulcolax please send Rx [facility] please contact patient once sent is [facility] for procedure 5/2

## 2025-05-01 ENCOUNTER — TELEPHONE (OUTPATIENT)
Age: 45
End: 2025-05-01

## 2025-05-01 DIAGNOSIS — Z12.11 SCREENING FOR COLON CANCER: Primary | ICD-10-CM

## 2025-05-01 RX ORDER — BISACODYL 5 MG/1
10 TABLET, DELAYED RELEASE ORAL ONCE
Qty: 2 TABLET | Refills: 0 | Status: SHIPPED | OUTPATIENT
Start: 2025-05-01 | End: 2025-05-01

## 2025-05-01 NOTE — TELEPHONE ENCOUNTER
Patients GI provider:  Dr. Amin    Number to return call: [phone]    Reason for call: Pt called and confirmed his colonoscopy for tomorrow advised he will receive the call between 2-6 today for the arrival time tomorrow. Also he has requested to speak with some one in the office for the prep script to be sent out to his pharmacy his insurance will cover it call transferred to [facility] in the office [address] [place] [address] for assistance.     Scheduled procedure/appointment date if applicable: procedure 5/2/25

## 2025-05-01 NOTE — TELEPHONE ENCOUNTER
Patients GI provider:  Dr. Amin    Number to return call: [phone]    Reason for call: Patient called in stating the Pharmacy did not receive Rx for Dulcolax. Patient is aware medication can be purchased over the counter but stated insurance will cover med. please send prescription for dulcolax to [facility] pharmacy and please contact patient once rx has been sent. Thank you.      Scheduled procedure/appointment date if applicable: Apt/procedure 5/2/2025

## 2025-05-02 ENCOUNTER — APPOINTMENT (OUTPATIENT)
Dept: PHYSICAL THERAPY | Facility: CLINIC | Age: 45
End: 2025-05-02
Payer: OTHER MISCELLANEOUS

## 2025-05-02 ENCOUNTER — ANESTHESIA EVENT (OUTPATIENT)
Dept: GASTROENTEROLOGY | Facility: HOSPITAL | Age: 45
End: 2025-05-02
Payer: COMMERCIAL

## 2025-05-02 ENCOUNTER — ANESTHESIA (OUTPATIENT)
Dept: GASTROENTEROLOGY | Facility: HOSPITAL | Age: 45
End: 2025-05-02
Payer: COMMERCIAL

## 2025-05-02 ENCOUNTER — HOSPITAL ENCOUNTER (OUTPATIENT)
Dept: GASTROENTEROLOGY | Facility: HOSPITAL | Age: 45
Setting detail: OUTPATIENT SURGERY
End: 2025-05-02
Attending: INTERNAL MEDICINE
Payer: COMMERCIAL

## 2025-05-02 VITALS
SYSTOLIC BLOOD PRESSURE: 108 MMHG | HEART RATE: 83 BPM | WEIGHT: 315 LBS | DIASTOLIC BLOOD PRESSURE: 55 MMHG | BODY MASS INDEX: 43.14 KG/M2 | RESPIRATION RATE: 18 BRPM | TEMPERATURE: 97.2 F | OXYGEN SATURATION: 98 %

## 2025-05-02 DIAGNOSIS — Z12.11 SPECIAL SCREENING FOR MALIGNANT NEOPLASMS, COLON: ICD-10-CM

## 2025-05-02 PROCEDURE — 45385 COLONOSCOPY W/LESION REMOVAL: CPT | Performed by: INTERNAL MEDICINE

## 2025-05-02 PROCEDURE — 88305 TISSUE EXAM BY PATHOLOGIST: CPT | Performed by: SPECIALIST

## 2025-05-02 RX ORDER — SIMETHICONE 40MG/0.6ML
SUSPENSION, DROPS(FINAL DOSAGE FORM)(ML) ORAL AS NEEDED
Status: COMPLETED | OUTPATIENT
Start: 2025-05-02 | End: 2025-05-02

## 2025-05-02 RX ORDER — SODIUM CHLORIDE, SODIUM LACTATE, POTASSIUM CHLORIDE, CALCIUM CHLORIDE 600; 310; 30; 20 MG/100ML; MG/100ML; MG/100ML; MG/100ML
INJECTION, SOLUTION INTRAVENOUS CONTINUOUS PRN
Status: DISCONTINUED | OUTPATIENT
Start: 2025-05-02 | End: 2025-05-02

## 2025-05-02 RX ORDER — LIDOCAINE HYDROCHLORIDE 20 MG/ML
INJECTION, SOLUTION EPIDURAL; INFILTRATION; INTRACAUDAL; PERINEURAL AS NEEDED
Status: DISCONTINUED | OUTPATIENT
Start: 2025-05-02 | End: 2025-05-02

## 2025-05-02 RX ORDER — PROPOFOL 10 MG/ML
INJECTION, EMULSION INTRAVENOUS AS NEEDED
Status: DISCONTINUED | OUTPATIENT
Start: 2025-05-02 | End: 2025-05-02

## 2025-05-02 RX ADMIN — PROPOFOL 50 MG: 10 INJECTION, EMULSION INTRAVENOUS at 11:31

## 2025-05-02 RX ADMIN — LIDOCAINE HYDROCHLORIDE 50 MG: 20 INJECTION, SOLUTION EPIDURAL; INFILTRATION; INTRACAUDAL; PERINEURAL at 11:29

## 2025-05-02 RX ADMIN — PROPOFOL 50 MG: 10 INJECTION, EMULSION INTRAVENOUS at 11:33

## 2025-05-02 RX ADMIN — PROPOFOL 50 MG: 10 INJECTION, EMULSION INTRAVENOUS at 11:38

## 2025-05-02 RX ADMIN — PROPOFOL 50 MG: 10 INJECTION, EMULSION INTRAVENOUS at 11:35

## 2025-05-02 RX ADMIN — PROPOFOL 150 MG: 10 INJECTION, EMULSION INTRAVENOUS at 11:29

## 2025-05-02 RX ADMIN — SODIUM CHLORIDE, SODIUM LACTATE, POTASSIUM CHLORIDE, AND CALCIUM CHLORIDE: .6; .31; .03; .02 INJECTION, SOLUTION INTRAVENOUS at 11:22

## 2025-05-02 RX ADMIN — Medication 40 MG: at 11:32

## 2025-05-02 NOTE — ANESTHESIA PREPROCEDURE EVALUATION
Procedure:  COLONOSCOPY    Relevant Problems   CARDIO   (+) Primary hypertension      MUSCULOSKELETAL   (+) Chronic bilateral low back pain      NEURO/PSYCH   (+) Chronic bilateral low back pain   (+) Panic attack      PULMONARY   (+) Moderate obstructive sleep apnea        Physical Exam    Airway    Mallampati score: III  TM Distance: >3 FB  Neck ROM: full     Dental   No notable dental hx     Cardiovascular  Rhythm: regular, Rate: normal, Cardiovascular exam normal    Pulmonary   Decreased breath sounds    Other Findings        Anesthesia Plan  ASA Score- 3     Anesthesia Type- IV sedation with anesthesia with ASA Monitors.         Additional Monitors:     Airway Plan:            Plan Factors-Exercise tolerance (METS): >4 METS.    Chart reviewed. EKG reviewed. Imaging results reviewed. Existing labs reviewed. Patient summary reviewed.    Patient is not a current smoker.  Patient did not smoke on day of surgery.            Induction- intravenous.    Postoperative Plan-     Perioperative Resuscitation Plan - Level 1 - Full Code.       Informed Consent- Anesthetic plan and risks discussed with patient.  I personally reviewed this patient with the CRNA. Discussed and agreed on the Anesthesia Plan with the CRNA..      NPO Status:  No vitals data found for the desired time range.

## 2025-05-02 NOTE — ANESTHESIA POSTPROCEDURE EVALUATION
Post-Op Assessment Note    CV Status:  Stable  Pain Score: 0    Pain management: adequate       Mental Status:  Alert and awake   Hydration Status:  Euvolemic   PONV Controlled:  Controlled   Airway Patency:  Patent     Post Op Vitals Reviewed: Yes    No anethesia notable event occurred.    Staff: CRNA           Last Filed PACU Vitals:  Vitals Value Taken Time   Temp 98 °F (36.7 °C) 05/02/25 1151   Pulse 79 05/02/25 1151   /59 05/02/25 1151   Resp 14 05/02/25 1151   SpO2 98 % 05/02/25 1151

## 2025-05-02 NOTE — H&P
History and Physical - [facility]  Valentin Reid 44 y.o. male MRN: [id number]                  HPI: Valentin Reid is a 44 y.o. year old male who presents for screening colonoscopy      REVIEW OF SYSTEMS: Per the HPI, and otherwise unremarkable.    Historical Information   Past Medical History:   Diagnosis Date    Allergic rhinitis     Asthma     Scar of elbow     Pt states tennis elbow    Sleep apnea     Sleep difficulties      Past Surgical History:   Procedure Laterality Date    ELBOW SURGERY Right 02/25/2025    adhesion removal     Social History   Social History     Substance and Sexual Activity   Alcohol Use Never     Social History     Substance and Sexual Activity   Drug Use Never     Social History     Tobacco Use   Smoking Status Never   Smokeless Tobacco Never     Family History   Problem Relation Age of Onset    Diabetes Mother     Hypertension Mother     Diabetes Father     Hypertension Father     Psychiatric Illness Father     Asthma Sister        Meds/Allergies       Current Outpatient Medications:     celecoxib (CeleBREX) 200 mg capsule    hydroCHLOROthiazide 12.5 mg capsule    Aspirin Low Dose 81 MG EC tablet    [START ON 6/15/2025] atovaquone-proguanil (MALARONE) 250-100 mg    bisacodyl (DULCOLAX) 5 mg EC tablet    EPINEPHrine (EPIPEN) 0.3 mg/0.3 mL SOAJ    fluticasone (FLONASE) 50 mcg/act nasal spray    oxyCODONE (ROXICODONE) 5 immediate release tablet    polyethylene glycol (GOLYTELY) 4000 mL solution    tirzepatide (Zepbound) 10 mg/0.5 mL auto-injector    Allergies   Allergen Reactions    Pollen Extract Cough, Eye Swelling, Fever, Headache, Nasal Congestion and Sneezing    Shellfish Allergy - Food Allergy Hives, Itching and Rash       Objective     /68   Pulse 96   Temp (!) 97.4 °F (36.3 °C) (Temporal)   Resp 22   Wt (!) 148 kg (327 lb)   SpO2 100%   BMI 43.14 kg/m²       PHYSICAL EXAM    Gen: NAD  Head: NCAT  CV: RRR  CHEST: Clear  ABD: soft, NT/ND  EXT:  no edema      ASSESSMENT/PLAN:  This is a 44 y.o. year old male here for colonoscopy, and he is stable and optimized for his procedure.

## 2025-05-06 ENCOUNTER — OFFICE VISIT (OUTPATIENT)
Dept: PHYSICAL THERAPY | Facility: CLINIC | Age: 45
End: 2025-05-06
Attending: ORTHOPAEDIC SURGERY
Payer: OTHER MISCELLANEOUS

## 2025-05-06 DIAGNOSIS — M77.11 LATERAL EPICONDYLITIS OF RIGHT ELBOW: Primary | ICD-10-CM

## 2025-05-06 PROCEDURE — 97530 THERAPEUTIC ACTIVITIES: CPT

## 2025-05-06 PROCEDURE — 97110 THERAPEUTIC EXERCISES: CPT

## 2025-05-06 PROCEDURE — 97112 NEUROMUSCULAR REEDUCATION: CPT

## 2025-05-06 PROCEDURE — 88305 TISSUE EXAM BY PATHOLOGIST: CPT | Performed by: SPECIALIST

## 2025-05-06 NOTE — PROGRESS NOTES
"Daily Note     Today's date: 2025  Patient name: Valentin Reid  : 1980  MRN: [id number]  Referring provider: Kamron Pedro, *  Dx:   Encounter Diagnosis     ICD-10-CM    1. Lateral epicondylitis of right elbow  M77.11           Start Time: 1615  Stop Time: 1700  Total time in clinic (min): 45 minutes    Subjective: Patient reports some right elbow irritation from rolling and putting weight on it from sleeping and shifting weight in bed.      Objective: See treatment diary below      Assessment: Tolerated treatment well.  Gentle loading per subjective c/o.  Doing well with high volume repetitions to fatigue. Patient demonstrated fatigue post treatment, exhibited good technique with therapeutic exercises, and would benefit from continued PT      Plan: Continue per plan of care.         POC Expires Auth Status Start Date Expiration Date PT Visit Limit           Date        Used 1       Remaining 46          Diagnosis: R ECRB repair   Precautions: n/a   Primary Goals: Wrist ext strength, flexibility, , \"sprint repetitions to fatigue without pain\"  Return to sport week 12 (eval at week 6)   *asterisks by exercise = given for HEP   Manuals    IASTM  5 min JW 5 Min JW  DO FOTO  Right ECRB JW 7 min   Manual Resisted ecc                                There Ex         Strength*        Wrist FLX/EXT isotonics*   AROM 2x13 Ext 4# x100    Ext eccentrics * 3,4,5 pounds  X50 each X10 blue tb horizontal      RD UD*        Scap stab   (Tb black)  Rows 2x10  SAPD 2x10     Wrist Flexion (wrist ext) S 3,4,5 pounds  X50 each AMRAP  2-3-4-5 # ea AROM 2x13 10\"x5 ea      Bicep curl        TB wrist ext isometrics (horizontal)  X10 blue       Towel  squeeze with tb resisted abduction    X8 per dir    Neuro Re-Ed        Wrist isometrics  Elbow 90*   X10     Isometric elbow extension   X10       Tricep Pull down rope pull apart   15# 2x12     Nerve Glides (ulnar, median, radial)  " "x10 x10     Flex bar    RD-UD 1 min  Sup-Pron 1 min    Hammer AROM sup-pro / rd-ud X20 1#     X20 ea   Isometrics     5\"x10 ea flx/ext   AROM     Flx/ext x20 ea   TrX rows    x5    Standing tb scap strengthening series (rows, sapd, T's_ 30x ea black               Patient education         Re-evaluation            Ther Act            Curl + OH Press      6# 2x10     Seated ER at 90      6# 2x15     Modalities                                                                       "

## 2025-05-07 ENCOUNTER — EVALUATION (OUTPATIENT)
Dept: PHYSICAL THERAPY | Facility: CLINIC | Age: 45
End: 2025-05-07
Attending: ORTHOPAEDIC SURGERY
Payer: OTHER MISCELLANEOUS

## 2025-05-07 ENCOUNTER — RESULTS FOLLOW-UP (OUTPATIENT)
Dept: GASTROENTEROLOGY | Facility: CLINIC | Age: 45
End: 2025-05-07

## 2025-05-07 DIAGNOSIS — M77.11 LATERAL EPICONDYLITIS OF RIGHT ELBOW: Primary | ICD-10-CM

## 2025-05-07 PROCEDURE — 97530 THERAPEUTIC ACTIVITIES: CPT

## 2025-05-07 PROCEDURE — 97110 THERAPEUTIC EXERCISES: CPT

## 2025-05-07 PROCEDURE — 97112 NEUROMUSCULAR REEDUCATION: CPT

## 2025-05-07 NOTE — LETTER
May 7, 2025    Kamron Pedro MD  [address] [place] [id number]    Patient: Valentin Reid   YOB: 1980   Date of Visit: 2025     Encounter Diagnosis     ICD-10-CM    1. Lateral epicondylitis of right elbow  M77.11           Dear Dr. Kamron Pedro MD:    Thank you for your recent referral of Valentin Reid. Please review the attached evaluation summary from Valentin's recent visit.     Please verify that you agree with the plan of care by signing the attached order.     If you have any questions or concerns, please do not hesitate to call.     I sincerely appreciate the opportunity to share in the care of one of your patients and hope to have another opportunity to work with you in the near future.       Sincerely,    Elvin Tinajero, PT      Referring Provider:      I certify that I have read the below Plan of Care and certify the need for these services furnished under this plan of treatment while under my care.                    Kamron Pedro MD  [address] [place] [address]  Via Fax: [phone]          PT Re-Evaluation     Today's date: 2025  Patient name: Valentin Reid  : 1980  MRN: [id number]  Referring provider: Kamron Pedro, *  Dx:   Encounter Diagnosis     ICD-10-CM    1. Lateral epicondylitis of right elbow  M77.11             Start Time: 0815  Stop Time: 0845  Total time in clinic (min): 30 minutes    Assessment  Impairments: abnormal muscle firing, abnormal muscle tone, abnormal or restricted ROM, abnormal movement, impaired physical strength, lacks appropriate home exercise program, pain with function and poor posture   Symptom irritability: low    Assessment details: Valentin Reid is a pleasant 44 y.o. male who presents to PT Reevaluation s/p right extensor carpiradialis brevis repair.  He has been compliant with attending PT and home exercise program since initial eval. He  has made mild improvements in subjective and objective data since initial eval but continues to have limitations compared to prior level of function. Valentin reports about 75% improvement since beginning PT and still struggles with reaching over head as it places a stress on his elbow.  He is still progress with therapeutic and functional tolerance and continues to have deficits in the above listed impairments and would benefit from additional skilled PT to address these deficits to return to prior level of function. His  strength has increase from one-fourth to one-half his uninvolved side.    Understanding of Dx/Px/POC: good     Prognosis: good    Goals  Impairment Goals 4-6 weeks  - Decrease pain to 2/10  - Improve wrist AROM to equal to the unaffected upper extremity  - Increase wrist strength to 5/5 throughout  - Increase scapular strength to 5/5 throughout    Functional Goals 6-8 weeks  - Return to Prior Level of Function  - Patient will be independent with HEP  - Patient will be able to reach overhead without increased pain/compensation/difficulty  - Patient will be able to reach behind back without increased pain/compensation/difficulty   - Patient will be able to wash hair without increased pain/compensation/difficulty   - Patient will be able to lift >25 pounds with proper mechanics        Plan  Patient would benefit from: skilled physical therapy  Planned modality interventions: cryotherapy, TENS, thermotherapy: hydrocollator packs and unattended electrical stimulation    Planned therapy interventions: abdominal trunk stabilization, behavior modification, body mechanics training, breathing training, flexibility, functional ROM exercises, home exercise program, joint mobilization, manual therapy, massage, Mario taping, muscle pump exercises, neuromuscular re-education, patient education, postural training, strengthening, stretching, therapeutic activities, therapeutic exercise and therapeutic  training    Frequency: 2x week  Duration in weeks: 8  Treatment plan discussed with: patient  Plan details: Prognosis above is given PT services 2x/week tapering to 1x/week over the next 2 months and home program adherence.        Subjective Evaluation    History of Present Illness  Mechanism of injury: WORK/SCHOOL:  in [place], heavy twisting   HOME LIFE:   HOBBIES/EXERCISE:   PLOF:    HISTORY OF CURRENT INJURY:  First noticed last year with lifting and wrist extension.  Lasted entire summer, had cortisone shot and felt great for w months then came back when he was using the shovel.  Two weeks was lifting heavy and had significant pain again.  Saw doc and received ECRB repair and scar tissue debridement on 25th of February.  Was in pain a couple of days.  Presenting to PT eval at end of 6th week (functional phase)  PAIN LOCATION/DESCRIPTORS: Right later elbow  AGGRAVATING FACTORS:  wrist extensors with fingers extended picking things up  EASES: rest  DAY PATTERN:  IMAGING:    SPECIAL QUESTIONS:    Valentin denies a new onset of Tingling and Numbness.  PATIENT GOALS: Get back to work    ReEval 5/7/2025    Patient only experiences soreness or discomfort that he describes as a weakness following by pain specific to his elbow during activities, feels fine until reaching or pushing himself.  He endorses improvement in strength since starting PT and reports some progress but hard to for him to pinpoint or articulate the progress.  Able to report 70% improvement since starting, the last 30% would be able to reach up overhead (R) without feeling tightness or pain, especially with objects lifting overhead.  Comments on also improvement with supination/pronation tolerance.  Being able to withstand longer durations of time     Patient Goals  Patient goals for therapy: decreased pain, increased strength, return to sport/leisure activities, increased motion, independence with ADLs/IADLs and return to  "work    Pain  Current pain ratin  At best pain ratin  At worst pain ratin  Quality: sharp and tight          Objective     Observations     Right Elbow   Positive for incision.     Neurological Testing     Sensation     Elbow   Left Elbow   Intact: light touch    Right Elbow   Intact: light touch    Reflexes   Left   Biceps (C5/C6): normal (2+)    Active Range of Motion     Right Elbow   Flexion: WFL  Extension: WFL  Forearm supination: WFL  Forearm pronation: WFL    Strength/Myotome Testing     Right Elbow   Flexion: 5  Extension: 4+  Forearm supination: 5  Forearm pronation: 5    Additional Strength Details  L  100lb > 90  Rgrip 25lb > 45 lb    Tests     Right Elbow   Positive Cozen's.               POC Expires Auth Status Start Date Expiration Date PT Visit Limit           Date        Used 1       Remaining 46          Diagnosis: R ECRB repair   Precautions: n/a   Primary Goals: Wrist ext strength, flexibility, , \"sprint repetitions to fatigue without pain\"  Return to sport week 12 (eval at week 6)   *asterisks by exercise = given for HEP   Manuals    IASTM   5 Min JW  DO FOTO  Right ECRB JW 7 min   Manual Resisted ecc                                There Ex         Strength*        Wrist FLX/EXT isotonics*   AROM 2x13 Ext 4# x100    Ext eccentrics * 3,4,5 pounds  X50 each Flex bar   L concentric Flexin > R eccentric flexion)2x20 dark blue with overhead       RD UD*        Scap stab   (Tb black)  Rows 2x10  SAPD 2x10     Wrist Flexion (wrist ext) S 3,4,5 pounds  X50 each  AROM 2x13 10\"x5 ea      Bicep curl  Arnold press 3# 2x10      TB wrist ext isometrics (horizontal)        Towel  squeeze with tb resisted abduction    X8 per dir    Neuro Re-Ed        Wrist isometrics  Elbow 90*   X10     Isometric elbow extension   X10       Tricep Pull down rope pull apart   15# 2x12     Nerve Glides (ulnar, median, radial)   x10     Flex bar  X10 supination breaks  RD-UD 1 " "min  Sup-Pron 1 min    Hammer AROM sup-pro / rd-ud X20 1#     X20 ea   Isometrics     5\"x10 ea flx/ext   AROM     Flx/ext x20 ea   TrX rows    x5    Standing tb scap strengthening series (rows, sapd, T's_ 30x ea black               Patient education         Re-evaluation            Ther Act            Curl + OH Press      6# 2x10     Seated ER at 90      6# 2x15     Modalities                                                                     "

## 2025-05-07 NOTE — PROGRESS NOTES
PT Re-Evaluation     Today's date: 2025  Patient name: Valentin Reid  : 1980  MRN: [id number]  Referring provider: Kamron Pedro, *  Dx:   Encounter Diagnosis     ICD-10-CM    1. Lateral epicondylitis of right elbow  M77.11             Start Time: 0815  Stop Time: 0845  Total time in clinic (min): 30 minutes    Assessment  Impairments: abnormal muscle firing, abnormal muscle tone, abnormal or restricted ROM, abnormal movement, impaired physical strength, lacks appropriate home exercise program, pain with function and poor posture   Symptom irritability: low    Assessment details: Valentin Reid is a pleasant 44 y.o. male who presents to PT Reevaluation s/p right extensor carpiradialis brevis repair.  He has been compliant with attending PT and home exercise program since initial eval. He has made mild improvements in subjective and objective data since initial eval but continues to have limitations compared to prior level of function. Valentin reports about 75% improvement since beginning PT and still struggles with reaching over head as it places a stress on his elbow.  He is still progress with therapeutic and functional tolerance and continues to have deficits in the above listed impairments and would benefit from additional skilled PT to address these deficits to return to prior level of function. His  strength has increase from one-fourth to one-half his uninvolved side.    Understanding of Dx/Px/POC: good     Prognosis: good    Goals  Impairment Goals 4-6 weeks  - Decrease pain to 2/10  - Improve wrist AROM to equal to the unaffected upper extremity  - Increase wrist strength to 5/5 throughout  - Increase scapular strength to 5/5 throughout    Functional Goals 6-8 weeks  - Return to Prior Level of Function  - Patient will be independent with HEP  - Patient will be able to reach overhead without increased pain/compensation/difficulty  - Patient will be able to reach behind back  without increased pain/compensation/difficulty   - Patient will be able to wash hair without increased pain/compensation/difficulty   - Patient will be able to lift >25 pounds with proper mechanics        Plan  Patient would benefit from: skilled physical therapy  Planned modality interventions: cryotherapy, TENS, thermotherapy: hydrocollator packs and unattended electrical stimulation    Planned therapy interventions: abdominal trunk stabilization, behavior modification, body mechanics training, breathing training, flexibility, functional ROM exercises, home exercise program, joint mobilization, manual therapy, massage, Mario taping, muscle pump exercises, neuromuscular re-education, patient education, postural training, strengthening, stretching, therapeutic activities, therapeutic exercise and therapeutic training    Frequency: 2x week  Duration in weeks: 8  Treatment plan discussed with: patient  Plan details: Prognosis above is given PT services 2x/week tapering to 1x/week over the next 2 months and home program adherence.        Subjective Evaluation    History of Present Illness  Mechanism of injury: WORK/SCHOOL:  in [place], heavy twisting   HOME LIFE:   HOBBIES/EXERCISE:   PLOF:    HISTORY OF CURRENT INJURY:  First noticed last year with lifting and wrist extension.  Lasted entire summer, had cortisone shot and felt great for w months then came back when he was using the shovel.  Two weeks was lifting heavy and had significant pain again.  Saw doc and received ECRB repair and scar tissue debridement on 25th of February.  Was in pain a couple of days.  Presenting to PT eval at end of 6th week (functional phase)  PAIN LOCATION/DESCRIPTORS: Right later elbow  AGGRAVATING FACTORS:  wrist extensors with fingers extended picking things up  EASES: rest  DAY PATTERN:  IMAGING:    SPECIAL QUESTIONS:    Valentin denies a new onset of Tingling and Numbness.  PATIENT GOALS: Get back to work    ReEval  "2025    Patient only experiences soreness or discomfort that he describes as a weakness following by pain specific to his elbow during activities, feels fine until reaching or pushing himself.  He endorses improvement in strength since starting PT and reports some progress but hard to for him to pinpoint or articulate the progress.  Able to report 70% improvement since starting, the last 30% would be able to reach up overhead (R) without feeling tightness or pain, especially with objects lifting overhead.  Comments on also improvement with supination/pronation tolerance.  Being able to withstand longer durations of time     Patient Goals  Patient goals for therapy: decreased pain, increased strength, return to sport/leisure activities, increased motion, independence with ADLs/IADLs and return to work    Pain  Current pain ratin  At best pain ratin  At worst pain ratin  Quality: sharp and tight          Objective     Observations     Right Elbow   Positive for incision.     Neurological Testing     Sensation     Elbow   Left Elbow   Intact: light touch    Right Elbow   Intact: light touch    Reflexes   Left   Biceps (C5/C6): normal (2+)    Active Range of Motion     Right Elbow   Flexion: WFL  Extension: WFL  Forearm supination: WFL  Forearm pronation: WFL    Strength/Myotome Testing     Right Elbow   Flexion: 5  Extension: 4+  Forearm supination: 5  Forearm pronation: 5    Additional Strength Details  L  100lb > 90  Rgrip 25lb > 45 lb    Tests     Right Elbow   Positive Cozen's.               POC Expires Auth Status Start Date Expiration Date PT Visit Limit           Date        Used 1       Remaining 46          Diagnosis: R ECRB repair   Precautions: n/a   Primary Goals: Wrist ext strength, flexibility, , \"sprint repetitions to fatigue without pain\"  Return to sport week 12 (eval at week 6)   *asterisks by exercise = given for HEP   Manuals    IASTM   5 Min JW  DO " "FOTO  Right ECRB JW 7 min   Manual Resisted ecc                                There Ex         Strength*        Wrist FLX/EXT isotonics*   AROM 2x13 Ext 4# x100    Ext eccentrics * 3,4,5 pounds  X50 each Flex bar   L concentric Flexin > R eccentric flexion)2x20 dark blue with overhead       RD UD*        Scap stab   (Tb black)  Rows 2x10  SAPD 2x10     Wrist Flexion (wrist ext) S 3,4,5 pounds  X50 each  AROM 2x13 10\"x5 ea      Bicep curl  Arnold press 3# 2x10      TB wrist ext isometrics (horizontal)        Towel  squeeze with tb resisted abduction    X8 per dir    Neuro Re-Ed        Wrist isometrics  Elbow 90*   X10     Isometric elbow extension   X10       Tricep Pull down rope pull apart   15# 2x12     Nerve Glides (ulnar, median, radial)   x10     Flex bar  X10 supination breaks  RD-UD 1 min  Sup-Pron 1 min    Hammer AROM sup-pro / rd-ud X20 1#     X20 ea   Isometrics     5\"x10 ea flx/ext   AROM     Flx/ext x20 ea   TrX rows    x5    Standing tb scap strengthening series (rows, sapd, T's_ 30x ea black               Patient education         Re-evaluation            Ther Act            Curl + OH Press      6# 2x10     Seated ER at 90      6# 2x15     Modalities                                                       "

## 2025-05-09 ENCOUNTER — RESULTS FOLLOW-UP (OUTPATIENT)
Dept: OTHER | Facility: HOSPITAL | Age: 45
End: 2025-05-09

## 2025-05-13 ENCOUNTER — OFFICE VISIT (OUTPATIENT)
Dept: PHYSICAL THERAPY | Facility: CLINIC | Age: 45
End: 2025-05-13
Attending: ORTHOPAEDIC SURGERY
Payer: OTHER MISCELLANEOUS

## 2025-05-13 DIAGNOSIS — M77.11 LATERAL EPICONDYLITIS OF RIGHT ELBOW: Primary | ICD-10-CM

## 2025-05-13 PROCEDURE — 97110 THERAPEUTIC EXERCISES: CPT

## 2025-05-13 PROCEDURE — 97112 NEUROMUSCULAR REEDUCATION: CPT

## 2025-05-13 PROCEDURE — 97530 THERAPEUTIC ACTIVITIES: CPT

## 2025-05-13 NOTE — PROGRESS NOTES
"Daily Note     Today's date: 2025  Patient name: Valentin Reid  : 1980  MRN: [id number]  Referring provider: Kamron Pedro, *  Dx:   Encounter Diagnosis     ICD-10-CM    1. Lateral epicondylitis of right elbow  M77.11                      Subjective: Patient reports feeling good with his right elbow, however has some right shoulder pain over the weekend. Reports that he continues to fee tightness with moving his arm overhead and out the side.        Objective: See treatment diary below      Assessment: Tolerated treatment well.  Worked on different  strengths (hook, spherical, and fingertip).  Increased load and volume on wrist extension isotonics to 6#.  Also implimented hi-row to facilitate wrist extensors and scapulothoracic postural strength.  Advised to keep moving arm in ways he feels stretch and tightness as per subjective.  Patient demonstrated fatigue post treatment, exhibited good technique with therapeutic exercises, and would benefit from continued PT      Plan: Continue per plan of care.         POC Expires Auth Status Start Date Expiration Date PT Visit Limit           Date        Used 1       Remaining 46          Diagnosis: R ECRB repair   Precautions: n/a   Primary Goals: Wrist ext strength, flexibility, , \"sprint repetitions to fatigue without pain\"  Return to sport week 12 (eval at week 6)   *asterisks by exercise = given for HEP   Manuals    IASTM   5 Min JW  DO FOTO  Right ECRB JW 7 min   Manual Resisted ecc                                There Ex         Strength*        Wrist FLX/EXT isotonics*   Ext   5# x60  6# x60    Flx  10# x60    Ext 4# x100    Ext eccentrics * 3,4,5 pounds  X50 each Flex bar   L concentric Flexin > R eccentric flexion)2x20 dark blue with overhead       RD UD*        Scap stab        Wrist Flexion (wrist ext) S 3,4,5 pounds  X50 each   10\"x5 ea      Bicep curl  Arnold press 3# 2x10      TB wrist ext " "isometrics (horizontal)        Spherical Squeeze  Yellow x5 laps carries      Cylindrical   25# x5 laps carries      Finger Tip   10# 5 laps      Towel  squeeze with tb resisted abduction    X8 per dir    Neuro Re-Ed        Wrist isometrics  Elbow 90*        Isometric elbow extension        Tricep Pull down rope pull apart        Nerve Glides (ulnar, median, radial)        Flex bar  X10 supination breaks  RD-UD 1 min  Sup-Pron 1 min    Hammer AROM sup-pro / rd-ud X20 1#     X20 ea   Isometrics     5\"x10 ea flx/ext   AROM     Flx/ext x20 ea   TrX rows   x10 x5    Standing tb scap strengthening series (rows, sapd, T's_ 30x ea black  Black x30     Hi Rows   Blk x30     Patient education         Re-evaluation            Ther Act            Curl + OH Press      6# 2x10     UBE   Lvl 7; 1.5 ea     Seated ER at 90      6# 2x15     Modalities                                                       "

## 2025-05-16 ENCOUNTER — OFFICE VISIT (OUTPATIENT)
Dept: PHYSICAL THERAPY | Facility: CLINIC | Age: 45
End: 2025-05-16
Attending: ORTHOPAEDIC SURGERY
Payer: OTHER MISCELLANEOUS

## 2025-05-16 DIAGNOSIS — M77.11 LATERAL EPICONDYLITIS OF RIGHT ELBOW: Primary | ICD-10-CM

## 2025-05-16 PROCEDURE — 97112 NEUROMUSCULAR REEDUCATION: CPT

## 2025-05-16 PROCEDURE — 97110 THERAPEUTIC EXERCISES: CPT

## 2025-05-16 PROCEDURE — 97530 THERAPEUTIC ACTIVITIES: CPT

## 2025-05-16 NOTE — PROGRESS NOTES
"Daily Note     Today's date: 2025  Patient name: Valentin Reid  : 1980  MRN: [id number]  Referring provider: Kamron Pedro, *  Dx:   Encounter Diagnosis     ICD-10-CM    1. Lateral epicondylitis of right elbow  M77.11           Start Time: 0854  Stop Time: 930  Total time in clinic (min): 36 minutes    Subjective: Patient is feeling \"alright\", comments on slight soreness since last session but doing well overall.  Reports being able to grab box of cereal and lift it up for the first time and noted improvement.      Objective: See treatment diary below      Assessment: Tolerated treatment well. Able to tolerate higher loads and different ranges of  arm movement that required stability at the extensor carpi radilais longus muscle. Continued to target several different  and squeezes.  Implimenting functional resisted scapulothoracic loading with weight (see: bent over row with 20# 2x10) - comments similar sensation when starting his weed whacker.   Patient demonstrated fatigue post treatment, exhibited good technique with therapeutic exercises, and would benefit from continued PT      Plan: Continue per plan of care.         POC Expires Auth Status Start Date Expiration Date PT Visit Limit           Date        Used 1       Remaining 46          Diagnosis: R ECRB repair   Precautions: n/a   Primary Goals: Wrist ext strength, flexibility, , \"sprint repetitions to fatigue without pain\"  Return to sport week 12 (eval at week 6)   *asterisks by exercise = given for HEP   Manuals    IASTM   5 Min JW  DO FOTO  Right ECRB JW 7 min   Manual Resisted ecc                                There Ex         Strength*        Wrist FLX/EXT isotonics*   Ext   5# x60  6# x60    Flx  10# x60    8# 6x20    Ext eccentrics * 3,4,5 pounds  X50 each Flex bar   L concentric Flexin > R eccentric flexion)2x20 dark blue with overhead       RD UD*        Scap stab        Wrist Flexion " "(wrist ext) S 3,4,5 pounds  X50 each       Bicep curl  Arnold press 3# 2x10      TB wrist ext isometrics (horizontal)        Spherical Squeeze  Yellow x5 laps carries      Pinch      Airex squeezes 5\" x10 with elbow extended shoulderd flexed to 90    Hook     Kettlbell Handle grab with Front delt raise x30 5lb    Cylindrical   25# x5 laps carries  Bent over row 25# DB    Finger Tip   10# 5 laps  10# 5 laps with supination pronation    Towel  squeeze with tb resisted abduction        Neuro Re-Ed        Wrist isometrics  Elbow 90*        Isometric elbow extension        Tricep Pull down rope pull apart        Nerve Glides (ulnar, median, radial)        Flex bar  X10 supination breaks      Hammer AROM sup-pro / rd-ud X20 1#     X20 ea   Isometrics     5\"x10 ea flx/ext   AROM     Flx/ext x20 ea   TrX rows   x10     Standing tb scap strengthening series (rows, sapd, T's_ 30x ea black  Black x30     Hi Rows   Blk x30     Patient education         Re-evaluation           Ther Act            Curl + OH Press      8# 3x10     UBE   Lvl 7; 1.5 ea Lvl 7 2 min ea    Seated ER at 90      8# 3x15     Modalities                                                         "

## 2025-05-20 ENCOUNTER — OFFICE VISIT (OUTPATIENT)
Dept: PHYSICAL THERAPY | Facility: CLINIC | Age: 45
End: 2025-05-20
Attending: ORTHOPAEDIC SURGERY
Payer: OTHER MISCELLANEOUS

## 2025-05-20 DIAGNOSIS — M77.11 LATERAL EPICONDYLITIS OF RIGHT ELBOW: Primary | ICD-10-CM

## 2025-05-20 PROCEDURE — 97530 THERAPEUTIC ACTIVITIES: CPT

## 2025-05-20 PROCEDURE — 97110 THERAPEUTIC EXERCISES: CPT

## 2025-05-20 PROCEDURE — 97112 NEUROMUSCULAR REEDUCATION: CPT

## 2025-05-20 NOTE — PROGRESS NOTES
"Daily Note     Today's date: 2025  Patient name: Valentin Reid  : 1980  MRN: [id number]  Referring provider: Kamron Pedro, *  Dx:   Encounter Diagnosis     ICD-10-CM    1. 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      Objective: See treatment diary below      Assessment: Tolerated treatment well. Patient engaged in dynamic wrist and elbow control with resisted supination, pronation, ulnar/radial deviation.  Notable ease with pronation, more discomfort and difficulty with supination.  Able to increase wrist extension weight to 9# today. Patient demonstrated fatigue post treatment, exhibited good technique with therapeutic exercises, and would benefit from continued PT      Plan: Continue per plan of care.         POC Expires Auth Status Start Date Expiration Date PT Visit Limit           Date        Used 1       Remaining 46          Diagnosis: R ECRB repair   Precautions: n/a   Primary Goals: Wrist ext strength, flexibility, , \"sprint repetitions to fatigue without pain\"  Return to sport week 12 (eval at week 6)   *asterisks by exercise = given for HEP   Manuals    IASTM   5 Min JW  DO FOTO     Manual Resisted ecc                                There Ex         Strength*        Wrist FLX/EXT isotonics*   Ext   5# x60  6# x60    Flx  10# x60    8# 6x20    Ext eccentrics * 3,4,5 pounds  X50 each Flex bar   L concentric Flexin > R eccentric flexion)2x20 dark blue with overhead    5# x15  7# x15  9# x15   RD UD*        Scap stab        Wrist Flexion (wrist ext) S 3,4,5 pounds  X50 each    5# x15  7# x15  9# x15   Bicep curl  Arnold press 3# 2x10      TB wrist ext isometrics (horizontal)        Spherical Squeeze  Yellow " "x5 laps carries      Pinch      Airex squeezes 5\" x10 with elbow extended shoulderd flexed to 90    Hook     Kettlbell Handle grab with Front delt raise x30 5lb    Cylindrical   25# x5 laps carries  Bent over row 25# DB    Finger Tip   10# 5 laps  10# 5 laps with supination pronation    Towel  squeeze with tb resisted abduction        Neuro Re-Ed        Wrist isometrics  Elbow 90*        Isometric elbow extension        Tricep Pull down rope pull apart        Nerve Glides (ulnar, median, radial)        Flex bar  X10 supination breaks      Hammer AROM sup-pro / rd-ud X20 1#        Isometrics        AROM        TrX rows   x10     Standing tb scap strengthening series (rows, sapd, T's_ 30x ea black  Black x30     Hi Rows   Blk x30     Patient education         Re-evaluation          Ther Act           Curl + OH Press      8# 3x10    UBE   Lvl 7; 1.5 ea Lvl 7 2 min ea Lvl 7 2/2   DB - Tb UD     Pink 2x10   DB  Tb Supination     Pink 2x10   Bar- 4 Cone Drill (Pronation, UD,RD, Supination     3 laps   KB RD     10# 2x10   KB Sup-pronation     5# x6   Bar whirl arounds        Ball Let go and grabs     2x20   BALL Smash Roll outs     x25   Seated ER at 90      8# 3x15    Modalities                                                          "

## 2025-05-23 ENCOUNTER — OFFICE VISIT (OUTPATIENT)
Dept: PHYSICAL THERAPY | Facility: CLINIC | Age: 45
End: 2025-05-23
Attending: ORTHOPAEDIC SURGERY
Payer: OTHER MISCELLANEOUS

## 2025-05-23 DIAGNOSIS — M77.11 LATERAL EPICONDYLITIS OF RIGHT ELBOW: Primary | ICD-10-CM

## 2025-05-23 PROCEDURE — 97110 THERAPEUTIC EXERCISES: CPT

## 2025-05-23 PROCEDURE — 97112 NEUROMUSCULAR REEDUCATION: CPT

## 2025-05-23 PROCEDURE — 97530 THERAPEUTIC ACTIVITIES: CPT

## 2025-05-23 NOTE — PROGRESS NOTES
"Daily Note     Today's date: 2025  Patient name: Valentin Reid  : 1980  MRN: [id number]  Referring provider: Kamron Pedro, *  Dx: No diagnosis found.               Subjective: \" My elbow didn't feel too bad since last time, my back has been feeling tight though.\"      Objective: See treatment diary below      Assessment: Tolerated treatment well. Educated Valentin on lumbar arom exercises, he is likely experiencing back pain due to recent mobility decline to being off work. Wrose with riasing arm overhead signaling lack of lumbopelvic control.  Progressed wrist extension weight adequately, slight cramping with ten pounds today, relief with short break/rest.  Patient demonstrated fatigue post treatment, exhibited good technique with therapeutic exercises, and would benefit from continued PT      Plan: Continue per plan of care.         POC Expires Auth Status Start Date Expiration Date PT Visit Limit           Date        Used 1       Remaining 46          Diagnosis: R ECRB repair   Precautions: n/a   Primary Goals: Wrist ext strength, flexibility, , \"sprint repetitions to fatigue without pain\"  Return to sport week 12 (eval at week 6)   *asterisks by exercise = given for HEP   Manuals  5   IASTM   5 Min JW  DO FOTO     Manual Resisted ecc                                There Ex         Strength*        Wrist FLX/EXT isotonics*   Ext   5# x60  6# x60    Flx  10# x60    8# 6x20    Ext eccentrics * 5# x25  9# x25  10# x25    Progress to 50 repetitions next visit Flex bar   L concentric Flexin > R eccentric flexion)2x20 dark blue with overhead    5# x15  7# x15  9# x15   RD UD*        Scap stab        Wrist Flexion (wrist ext) S 5# x25  9# x25  10# x25    5# x15  7# x15  9# x15   Bicep curl Arnold press 5# 2x10  Progress to 10# NV Arnold press 3# 2x10      TB wrist ext isometrics (horizontal)        Spherical Squeeze  Yellow x5 laps carries      Pinch      " "Airex squeezes 5\" x10 with elbow extended shoulderd flexed to 90    Hook     Kettlbell Handle grab with Front delt raise x30 5lb    Cylindrical   25# x5 laps carries  Bent over row 25# DB    Finger Tip  CW holding with finger tip  10# 5 laps  10# 5 laps with supination pronation    Delt raises (Ant, 45, Lateral) ea 5#x10    Progress 10# NV                 Towel  squeeze with tb resisted abduction Blue x10       Neuro Re-Ed        Wrist isometrics  Elbow 90*        Isometric elbow extension        Tricep Pull down rope pull apart        Nerve Glides (ulnar, median, radial)        Flex bar  X10 supination breaks      Hammer AROM sup-pro / rd-ud Big step x5 ea       Isometrics        AROM        TrX rows   x10     Standing tb scap strengthening series (rows, sapd, T's_   Black x30     Hi Rows   Blk x30     Patient education         Re-evaluation Educated on back stretches he can do to alleivate back stiffness/pain reported in subjective (Open books, DKTR wide base) handout given         Ther Act           Curl + OH Press      8# 3x10    UBE   Lvl 7; 1.5 ea Lvl 7 2 min ea Lvl 7 2/2   DB - Tb UD     Pink 2x10   DB  Tb Supination     Pink 2x10   Bar- 4 Cone Drill (Pronation, UD,RD, Supination     3 laps   KB RD     10# 2x10   KB Sup-pronation     5# x6   Bar whirl arounds        Ball Let go and grabs     2x20   BALL Smash Roll outs     x25   Seated ER at 90      8# 3x15    Modalities                                                            "

## 2025-05-27 ENCOUNTER — APPOINTMENT (OUTPATIENT)
Dept: PHYSICAL THERAPY | Facility: CLINIC | Age: 45
End: 2025-05-27
Attending: ORTHOPAEDIC SURGERY
Payer: OTHER MISCELLANEOUS

## 2025-05-28 ENCOUNTER — OFFICE VISIT (OUTPATIENT)
Dept: PHYSICAL THERAPY | Facility: CLINIC | Age: 45
End: 2025-05-28
Attending: ORTHOPAEDIC SURGERY
Payer: OTHER MISCELLANEOUS

## 2025-05-28 DIAGNOSIS — M77.11 LATERAL EPICONDYLITIS OF RIGHT ELBOW: Primary | ICD-10-CM

## 2025-05-28 PROCEDURE — 97110 THERAPEUTIC EXERCISES: CPT

## 2025-05-28 NOTE — PROGRESS NOTES
"Daily Note     Today's date: 2025  Patient name: Valentin Reid  : 1980  MRN: [id number]  Referring provider: Kamron Pedro, *  Dx:   Encounter Diagnosis     ICD-10-CM    1. Lateral epicondylitis of right elbow  M77.11           Start Time: 1545  Stop Time: 1630  Total time in clinic (min): 45 minutes    Subjective: Patient reports feeling a little stiff today.        Objective: See treatment diary below      Assessment: Tolerated treatment well. Applied graston scrapping for stiffness relief.   Incorporated pinch  with shoulder -elbow strengthening.  Doing well with exercises and progressing with volume with repetitions for wrist extensions and radial/ulnar deviation endurance to promote return to activities as a .   Patient demonstrated fatigue post treatment, exhibited good technique with therapeutic exercises, and would benefit from continued PT      Plan: Continue per plan of care.         POC Expires Auth Status Start Date Expiration Date PT Visit Limit           Date        Used 1       Remaining 46          Diagnosis: R ECRB repair   Precautions: n/a   Primary Goals: Wrist ext strength, flexibility, , \"sprint repetitions to fatigue without pain\"  Return to sport week 12 (eval at week 6)   *asterisks by exercise = given for HEP   Manuals    IASTM  5 min Lateral right dorsal forarm to insertion 5 Min JW  DO FOTO     Manual Resisted ecc                                There Ex         Strength*        Wrist FLX/EXT isotonics*   Ext   5# x60  6# x60    Flx  10# x60    8# 6x20    Ext eccentrics * 5# x25  9# x25  10# x25    Progress to 50 repetitions next visit    5# x15  7# x15  9# x15   RD UD*        Scap stab        Wrist Flexion (wrist ext) S 5# x25  9# x25  10# x25    5# x15  7# x15  9# x15   Bicep curl Arnold press 5# 2x10  Progress to 10# NV       TB wrist ext isometrics (horizontal)        Spherical Squeeze        Pinch      Airex " "squeezes 5\" x10 with elbow extended shoulderd flexed to 90    Hook     Kettlbell Handle grab with Front delt raise x30 5lb    Cylindrical     Bent over row 25# DB    Finger Tip  CW holding with finger tip  CW holding (2 5# weights) 2 laps  10# 5 laps with supination pronation    Delt raises (Ant, 45, Lateral) ea 5#x10    Progress 10# NV   5# cuff weight finger tip pinch     5\" x10              Towel  squeeze with tb resisted abduction Blue x10       Neuro Re-Ed        Wrist isometrics  Elbow 90*        Isometric elbow extension        Tricep Pull down rope pull apart        Nerve Glides (ulnar, median, radial)        Flex bar  RD/UD 30\" x2      Hammer AROM sup-pro / rd-ud Big step x5 ea       Isometrics        AROM        TrX rows   x10     Standing tb scap strengthening series (rows, sapd, T's_   Black x30     Hi Rows   Blk x30     Patient education         Re-evaluation Educated on back stretches he can do to alleivate back stiffness/pain reported in subjective (Open books, DKTR wide base) handout given         Ther Act          Up/down fence painting stretch x20 x20       Curl + OH Press      8# 3x10    UBE  Lvl 7 2'/2' Lvl 7; 1.5 ea Lvl 7 2 min ea Lvl 7 2/2   DB - Tb UD     Pink 2x10   DB  Tb Supination     Pink 2x10   Bar- 4 Cone Drill (Pronation, UD,RD, Supination     3 laps   KB RD     10# 2x10   KB Sup-pronation     5# x6   Bar whirl arounds        Ball Let go and grabs  Yellow  3x30\"   2x20   BALL Smash Roll outs     x25   Seated ER at 90      8# 3x15    Modalities                                                              "

## 2025-05-30 ENCOUNTER — OFFICE VISIT (OUTPATIENT)
Dept: PHYSICAL THERAPY | Facility: CLINIC | Age: 45
End: 2025-05-30
Attending: ORTHOPAEDIC SURGERY
Payer: OTHER MISCELLANEOUS

## 2025-05-30 DIAGNOSIS — M77.11 LATERAL EPICONDYLITIS OF RIGHT ELBOW: Primary | ICD-10-CM

## 2025-05-30 PROCEDURE — 97110 THERAPEUTIC EXERCISES: CPT

## 2025-05-30 PROCEDURE — 97140 MANUAL THERAPY 1/> REGIONS: CPT

## 2025-05-30 NOTE — PROGRESS NOTES
"Daily Note     Today's date: 2025  Patient name: Valentin Reid  : 1980  MRN: [id number]  Referring provider: Kamron Pedro, *  Dx:   Encounter Diagnosis     ICD-10-CM    1. Lateral epicondylitis of right elbow  M77.11           Start Time: 0845  Stop Time: 930  Total time in clinic (min): 45 minutes    Subjective: Patient feeling a little tired today, participated in a lot yard work yesterday.  Endorses improved ability to  and squeeze his hand with therapy activities and less overall discomfort.       Objective: See treatment diary below      Assessment: Tolerated treatment well. Worked on wider  arm activation activities to replicate his job.  Doing well and able to progress loading with angles and weight as outlined below.   Patient demonstrated fatigue post treatment, exhibited good technique with therapeutic exercises, and would benefit from continued PT      Plan: Continue per plan of care.         POC Expires Auth Status Start Date Expiration Date PT Visit Limit           Date        Used 1       Remaining 46          Diagnosis: R ECRB repair   Precautions: n/a   Primary Goals: Wrist ext strength, flexibility, , \"sprint repetitions to fatigue without pain\"  Return to sport week 12 (eval at week 6)   *asterisks by exercise = given for HEP   Manuals  5   IASTM  5 min Lateral right dorsal forarm to insertion 5 Min JW  DO FOTO     Manual Resisted ecc                                There Ex         Strength*        Wrist FLX/EXT isotonics*    8# 6x20    Ext eccentrics * 5# x25  9# x25  10# x25    Progress to 50 repetitions next visit  Blue band 2x20  5# x15  7# x15  9# x15   RD UD*        Scap stab        Wrist Flexion (wrist ext) S 5# x25  9# x25  10# x25    5# x15  7# x15  9# x15   Bicep curl Arnold press 5# 2x10  Progress to 10# NV  Blue band towel roll  x20     TB wrist ext isometrics (horizontal)   X5 ea  Horizontal   45 deg   vertical   " "  Spherical Squeeze        Pinch      Airex squeezes 5\" x10 with elbow extended shoulderd flexed to 90    Hook     Kettlbell Handle grab with Front delt raise x30 5lb    Cylindrical     Bent over row 25# DB    Finger Tip  CW holding with finger tip  CW holding (2 5# weights) 2 laps  10# 5 laps with supination pronation    Delt raises (Ant, 45, Lateral) ea 5#x10    Progress 10# NV   5# cuff weight finger tip pinch     5\" x10      Delt raise   Blue 2x10     Towel  squeeze with tb resisted abduction Blue x10       Neuro Re-Ed        Wrist isometrics  Elbow 90*        Isometric elbow extension        Tricep Pull down rope pull apart        Nerve Glides (ulnar, median, radial)        Flex bar  RD/UD 30\" x2      Hammer AROM sup-pro / rd-ud Big step x5 ea       Isometrics        AROM        TrX rows        Standing tb scap strengthening series (rows, sapd, T's_        Hi Rows   50# 2x10     Patient education         Re-evaluation Educated on back stretches he can do to alleivate back stiffness/pain reported in subjective (Open books, DKTR wide base) handout given        Ther Act         Up/down fence painting stretch x20 x20       Curl + OH Press     8# 3x10    UBE  Lvl 7 2'/2'  Lvl 7 2 min ea Lvl 7 2/2   DB - Tb UD     Pink 2x10   DB  Tb Supination     Pink 2x10   Bar- 4 Cone Drill (Pronation, UD,RD, Supination     3 laps   KB RD     10# 2x10   KB Sup-pronation     5# x6   Bar whirl arounds        Ball Let go and grabs  Yellow  3x30\"   2x20   BALL Smash Roll outs     x25   Seated ER at 90     8# 3x15    Modalities                                                              "

## 2025-06-03 ENCOUNTER — OFFICE VISIT (OUTPATIENT)
Dept: PHYSICAL THERAPY | Facility: CLINIC | Age: 45
End: 2025-06-03
Attending: ORTHOPAEDIC SURGERY
Payer: OTHER MISCELLANEOUS

## 2025-06-03 DIAGNOSIS — M77.11 LATERAL EPICONDYLITIS OF RIGHT ELBOW: Primary | ICD-10-CM

## 2025-06-03 PROCEDURE — 97110 THERAPEUTIC EXERCISES: CPT

## 2025-06-03 NOTE — PROGRESS NOTES
"Daily Note     Today's date: 6/3/2025  Patient name: Valentin Reid  : 1980  MRN: [id number]  Referring provider: Kamron Pedro, *  Dx:   Encounter Diagnosis     ICD-10-CM    1. Lateral epicondylitis of right elbow  M77.11           Start Time: 0745  Stop Time: 830  Total time in clinic (min): 45 minutes    Subjective: Patient felt good over the weekend, notice he could feel tension when he threw a jab.       Objective: See treatment diary below      Assessment: Tolerated treatment well.  Doing well with everything,  able to add small weight to radial-ulnar deviation isotonics.  He continues to improve with muscle activation and tolerance for wrist strengthening activities. Patient demonstrated fatigue post treatment, exhibited good technique with therapeutic exercises, and would benefit from continued PT      Plan: Continue per plan of care.         POC Expires Auth Status Start Date Expiration Date PT Visit Limit           Date        Used 1       Remaining 46          Diagnosis: R ECRB repair   Precautions: n/a   Primary Goals: Wrist ext strength, flexibility, , \"sprint repetitions to fatigue without pain\"  Return to sport week 12 (eval at week 6)   *asterisks by exercise = given for HEP   Manuals 5/23 5/28 5/30 6/3 5/20   IASTM  5 min Lateral right dorsal forarm to insertion 5 Min JW  DO FOTO     Manual Resisted ecc                                There Ex         Strength*        Wrist FLX/EXT isotonics*        Ext eccentrics * 5# x25  9# x25  10# x25    Progress to 50 repetitions next visit  Blue band 2x20 10 # 3x20 5# x15  7# x15  9# x15   RD UD*        Scap stab        Wrist Flexion (wrist ext) S 5# x25  9# x25  10# x25    5# x15  7# x15  9# x15   Bicep curl Arnold press 5# 2x10  Progress to 10# NV  Blue band towel roll  x20     TB wrist ext isometrics (horizontal)   X5 ea  Horizontal   45 deg   vertical     Spherical Squeeze    Blue Med ball    Pinch          Hook  " "       Cylindrical         Finger Tip  CW holding with finger tip  CW holding (2 5# weights) 2 laps      Delt raises (Ant, 45, Lateral) ea 5#x10    Progress 10# NV   5# cuff weight finger tip pinch     5\" x10      Delt raise   Blue 2x10     Towel  squeeze with tb resisted abduction Blue x10       Neuro Re-Ed        Wrist isometrics  Elbow 90*        Isometric elbow extension        Tricep Pull down rope pull apart        Nerve Glides (ulnar, median, radial)        Flex bar  RD/UD 30\" x2      Hammer AROM sup-pro / rd-ud Big step x5 ea       Isometrics        AROM        TrX rows        Standing tb scap strengthening series (rows, sapd, T's_    X10   Punnching x10    Hi Rows   50# 2x10     Patient education         Re-evaluation Educated on back stretches he can do to alleivate back stiffness/pain reported in subjective (Open books, DKTR wide base) handout given        Ther Act         Up/down fence painting stretch x20 x20  x20     Curl + OH Press        UBE  Lvl 7 2'/2'  Lvl 8 2 min ea Lvl 7 2/2   DB - Tb UD     Pink 2x10   DB  Tb Supination     Pink 2x10   Bar- 4 Cone Drill (Pronation, UD,RD, Supination    1# CW on big stick 6 laps 3 laps   KB RD     10# 2x10   KB Sup-pronation     5# x6   Bar whirl arounds        Ball Let go and grabs  Yellow  3x30\"  Yellow 2x10 2x20   BALL Smash Roll outs    X20 blue ball cc/cw x25   Seated ER at 90     10# x10 with OH press    Modalities                                                                "

## 2025-06-05 ENCOUNTER — OFFICE VISIT (OUTPATIENT)
Dept: PHYSICAL THERAPY | Facility: CLINIC | Age: 45
End: 2025-06-05
Attending: ORTHOPAEDIC SURGERY
Payer: OTHER MISCELLANEOUS

## 2025-06-05 DIAGNOSIS — M77.11 LATERAL EPICONDYLITIS OF RIGHT ELBOW: Primary | ICD-10-CM

## 2025-06-05 PROCEDURE — 97110 THERAPEUTIC EXERCISES: CPT

## 2025-06-05 PROCEDURE — 97140 MANUAL THERAPY 1/> REGIONS: CPT

## 2025-06-05 NOTE — PROGRESS NOTES
"Daily Note     Today's date: 2025  Patient name: Valentin Reid  : 1980  MRN: [id number]  Referring provider: Kamron Pedro, *  Dx:   Encounter Diagnosis     ICD-10-CM    1. Lateral epicondylitis of right elbow  M77.11           Start Time: 708  Stop Time: 07  Total time in clinic (min): 37 minutes    Subjective: Feeling a little tight in the elbow today.      Objective: See treatment diary below      Assessment: Tolerated treatment well.  Applied instrument assisted myofascial release to address tightness.  Employed repetitions to fatigue with load progression as outlined below.  Will be following up with ortho.  Patient demonstrated fatigue post treatment, exhibited good technique with therapeutic exercises, and would benefit from continued PT      Plan: Continue per plan of care.         POC Expires Auth Status Start Date Expiration Date PT Visit Limit           Date        Used 1       Remaining 46          Diagnosis: R ECRB repair   Precautions: n/a   Primary Goals: Wrist ext strength, flexibility, , \"sprint repetitions to fatigue without pain\"  Return to sport week 12 (eval at week 6)   *asterisks by exercise = given for HEP   Manuals 5/23 5/28 5/30 6/3 6/5   IASTM  5 min Lateral right dorsal forarm to insertion 5 Min JW  DO FOTO  JW   Manual Resisted ecc                                There Ex         Strength*        Wrist FLX/EXT isotonics*        Ext eccentrics * 5# x25  9# x25  10# x25    Progress to 50 repetitions next visit  Blue band 2x20 10 # 3x20 As many reps as possible to fatigue 10 #  X36  x42    Flexbar eccentric extension 2x10   Sup - pro     hammer 4#2x10   RD UD*     Hammer 4#2x10   Scap stab        Wrist Flexion (wrist ext) S 5# x25  9# x25  10# x25    10# x20   Bicep curl Arnold press 5# 2x10  Progress to 10# NV  Blue band towel roll  x20     TB wrist ext isometrics (horizontal)   X5 ea  Horizontal   45 deg   vertical  X10 ea  Horizontal   45 deg " "  vertical   Spherical Squeeze    Blue Med ball    Pinch          Hook         Cylindrical         Finger Tip  CW holding with finger tip  CW holding (2 5# weights) 2 laps      Delt raises (Ant, 45, Lateral) ea 5#x10    Progress 10# NV   5# cuff weight finger tip pinch     5\" x10      Delt raise   Blue 2x10     Towel  squeeze with tb resisted abduction Blue x10       Neuro Re-Ed        Wrist isometrics  Elbow 90*        Isometric elbow extension        Tricep Pull down rope pull apart        Nerve Glides (ulnar, median, radial)        Flex bar  RD/UD 30\" x2      Hammer AROM sup-pro / rd-ud Big step x5 ea       Isometrics        AROM        TrX rows        Standing tb scap strengthening series (rows, sapd, T's_    X10   Punnching x10    Hi Rows   50# 2x10     Patient education         Re-evaluation Educated on back stretches he can do to alleivate back stiffness/pain reported in subjective (Open books, DKTR wide base) handout given        Ther Act         Up/down fence painting stretch x20 x20  x20     Curl + OH Press        UBE  Lvl 7 2'/2'  Lvl 8 2 min ea Lvl 8  2 min ea   DB - Tb UD        DB  Tb Supination        Bar- 4 Cone Drill (Pronation, UD,RD, Supination    1# CW on big stick 6 laps    KB RD        KB Sup-pronation     See above   Bar whirl arounds        Ball Let go and grabs  Yellow  3x30\"  Yellow 2x10    BALL Smash Roll outs    X20 blue ball cc/cw    Seated ER at 90     10# x10 with OH press    Modalities                                                                 "

## 2025-06-06 ENCOUNTER — APPOINTMENT (OUTPATIENT)
Dept: PHYSICAL THERAPY | Facility: CLINIC | Age: 45
End: 2025-06-06
Attending: ORTHOPAEDIC SURGERY
Payer: OTHER MISCELLANEOUS

## 2025-06-11 ENCOUNTER — OFFICE VISIT (OUTPATIENT)
Dept: PHYSICAL THERAPY | Facility: CLINIC | Age: 45
End: 2025-06-11
Attending: ORTHOPAEDIC SURGERY
Payer: OTHER MISCELLANEOUS

## 2025-06-11 DIAGNOSIS — M77.11 LATERAL EPICONDYLITIS OF RIGHT ELBOW: Primary | ICD-10-CM

## 2025-06-11 PROCEDURE — 97112 NEUROMUSCULAR REEDUCATION: CPT

## 2025-06-11 PROCEDURE — 97110 THERAPEUTIC EXERCISES: CPT

## 2025-06-11 NOTE — PROGRESS NOTES
"Daily Note     Today's date: 2025  Patient name: Valentin Reid  : 1980  MRN: [id number]  Referring provider: Kamron Pedro, *  Dx:   Encounter Diagnosis     ICD-10-CM    1. Lateral epicondylitis of right elbow  M77.11                      Subjective: Patient reports work requesting 3x/week and willing to  maximize therapeutic openings for remainder of plan of care.  Reports doing a yard work and able to  and pull rocks from ground and elbow responding very well and happy about that and affirms that therapy is helping him.      Objective: See treatment diary below      Assessment: Tolerated treatment well. Engaged in loaded wrist and elbow movements with various  to continue challenging extensor carpiradialis musculature; responding well.  Slight cramping in hand with wrist flexion today, but able to work through it with repetition.  Patient demonstrated fatigue post treatment      Plan: Continue per plan of care.         POC Expires Auth Status Start Date Expiration Date PT Visit Limit           Date        Used 1       Remaining 46          Diagnosis: R ECRB repair   Precautions: n/a   Primary Goals: Wrist ext strength, flexibility, , \"sprint repetitions to fatigue without pain\"  Return to sport week 12 (eval at week 6)   *asterisks by exercise = given for HEP   Manuals 6/11 5/28 5/30 6/3 6/5   IASTM  5 min Lateral right dorsal forarm to insertion 5 Min JW  DO FOTO  JW   Manual Resisted ecc                                There Ex         Strength*        Wrist FLX/EXT isotonics*        Ext eccentrics *   10# x30      Blue band 2x20 10 # 3x20 As many reps as possible to fatigue 10 #  X36  x42    Flexbar eccentric extension 2x10   Sup - pro     hammer 4#2x10   RD UD*     Hammer 4#2x10   Scap stab        Wrist Extnesion standing table press into extension S 10\" x10       Wrist Flexion (wrist ext) S   10# x30    10# x20   Bicep curl   Blue band towel roll  x20     TB " "wrist ext isometrics (horizontal)   X5 ea  Horizontal   45 deg   vertical  X10 ea  Horizontal   45 deg   vertical   Spherical Squeeze    Blue Med ball    Pinch          Hook         Cylindrical         Finger Tip   CW holding (2 5# weights) 2 laps      Delt raises (Ant, 45, Lateral) ea  5# cuff weight finger tip pinch     5\" x10      Delt raise   Blue 2x10     Towel  squeeze with tb resisted abduction        Neuro Re-Ed        Wrist isometrics  Elbow 90*        Isometric elbow extension        Tricep Pull down rope pull apart        Nerve Glides (ulnar, median, radial)        Flex bar Ecc ext green x20 RD/UD 30\" x2      Hammer AROM sup-pro / rd-ud        Isometrics        AROM        TrX rows        Standing tb scap strengthening series (rows, sapd, T's_    X10   Punnching x10    Hi Rows   50# 2x10     Patient education         Re-evaluation         Ther Act         Up/down fence painting stretch x20 x20  x20     Curl + OH Press        UBE Lvl 7 2/2 Lvl 7 2'/2'  Lvl 8 2 min ea Lvl 8  2 min ea   DB - Tb UD        DB  Tb Supination        Bar- 4 Cone Drill (Pronation, UD,RD, Supination x10   1# CW on big stick 6 laps    KB RD        KB Sup-pronation     See above   Bar whirl arounds        Ball Let go and grabs Yellow x20 Yellow  3x30\"  Yellow 2x10    BALL Smash Roll outs Yellow x20   X20 blue ball cc/cw    Seated ER at 90     10# x10 with OH press    Modalities                                                                   "

## 2025-06-13 ENCOUNTER — OFFICE VISIT (OUTPATIENT)
Dept: PHYSICAL THERAPY | Facility: CLINIC | Age: 45
End: 2025-06-13
Attending: ORTHOPAEDIC SURGERY
Payer: OTHER MISCELLANEOUS

## 2025-06-13 DIAGNOSIS — M77.11 LATERAL EPICONDYLITIS OF RIGHT ELBOW: Primary | ICD-10-CM

## 2025-06-13 PROCEDURE — 97112 NEUROMUSCULAR REEDUCATION: CPT

## 2025-06-13 PROCEDURE — 97110 THERAPEUTIC EXERCISES: CPT

## 2025-06-13 PROCEDURE — 97530 THERAPEUTIC ACTIVITIES: CPT

## 2025-06-13 NOTE — PROGRESS NOTES
"Daily Note     Today's date: 2025  Patient name: Valentin Reid  : 1980  MRN: [id number]  Referring provider: Kamron Pedro, *  Dx:   Encounter Diagnosis     ICD-10-CM    1. Lateral epicondylitis of right elbow  M77.11                      Subjective: Pt reports that he would like some suggestions on exercises to perform while he is away on vacation.       Objective: See treatment diary below      Assessment: Tolerated treatment well. Began with UBE to work on UE endurance.  Able to add some wrist strengthening as well as  strengthening with smaller grasp.  Able to also perform closed chain strengthening today on unstable surfaces with fatigue noted.  Added exercises and strengths to PT's HEP as well as provided pt with putty to work on extension and abduction strengthening for fingers and thumb.  Patient demonstrated fatigue post treatment, exhibited good technique with therapeutic exercises, and would benefit from continued PT      Plan: Continue per plan of care.         POC Expires Auth Status Start Date Expiration Date PT Visit Limit           Date        Used 1       Remaining 46          Diagnosis: R ECRB repair   Precautions: n/a   Primary Goals: Wrist ext strength, flexibility, , \"sprint repetitions to fatigue without pain\"  Return to sport week 12 (eval at week 6)   *asterisks by exercise = given for HEP   Manuals 6/11 6/13 6/13 6/3 6/5   IASTM  5 min Lateral right dorsal forarm to insertion 5 Min JW  DO FOTO  JW   Manual Resisted ecc                                There Ex         Strength*        Wrist FLX/EXT isotonics*        Ext eccentrics *   10# x30      Blue band 2x20 10 # 3x20 As many reps as possible to fatigue 10 #  X36  x42    Flexbar eccentric extension 2x10   Sup - pro     hammer 4#2x10   RD UD*     Hammer 4#2x10   Scap stab  Closed chain sap push ups, wall, unstable surface      Wrist Extnesion standing table press into extension S 10\" x10       Wrist " "Flexion (wrist ext) S   10# x30    10# x20   Bicep curl   Blue band towel roll  x20     TB wrist ext isometrics (horizontal)   X5 ea  Horizontal   45 deg   vertical  X10 ea  Horizontal   45 deg   vertical   Spherical Squeeze    Blue Med ball    Pinch    1 lap fleixon 1 laps AB, cone pinching  5# weight      Hook         Cylindrical         Finger Tip         Delt raises (Ant, 45, Lateral) ea        Delt raise   Blue 2x10     Towel  squeeze with tb resisted abduction        Neuro Re-Ed        Wrist isometrics  Elbow 90*        Isometric elbow extension        Tricep Pull down rope pull apart        Nerve Glides (ulnar, median, radial)        Flex bar Ecc ext green x20 RD/UD 30\" x2      Hammer AROM sup-pro / rd-ud        Isometrics        AROM        TrX rows        Standing tb scap strengthening series (rows, sapd, T's_    X10   Punnching x10    Hi Rows   50# 2x10     Patient education         Re-evaluation         Ther Act         Up/down fence painting stretch x20   x20     Curl + OH Press        UBE Lvl 7 2/2 Lvl 7 2'/2'  Lvl 8 2 min ea Lvl 8  2 min ea   DB - Tb UD        DB  Tb Supination        Bar- 4 Cone Drill (Pronation, UD,RD, Supination x10   1# CW on big stick 6 laps    KB RD  26, up, down, side side, KB gripping      KB Sup-pronation     See above   Bar whirl arounds        Ball Let go and grabs Yellow x20   Yellow 2x10    BALL Smash Roll outs Yellow x20   X20 blue ball cc/cw    Seated ER at 90     10# x10 with OH press    Modalities                                                                     "

## 2025-06-16 ENCOUNTER — EVALUATION (OUTPATIENT)
Dept: PHYSICAL THERAPY | Facility: CLINIC | Age: 45
End: 2025-06-16
Attending: ORTHOPAEDIC SURGERY
Payer: OTHER MISCELLANEOUS

## 2025-06-16 DIAGNOSIS — M77.11 LATERAL EPICONDYLITIS OF RIGHT ELBOW: Primary | ICD-10-CM

## 2025-06-16 PROCEDURE — 97112 NEUROMUSCULAR REEDUCATION: CPT

## 2025-06-16 PROCEDURE — 97140 MANUAL THERAPY 1/> REGIONS: CPT

## 2025-06-16 PROCEDURE — 97110 THERAPEUTIC EXERCISES: CPT

## 2025-06-16 NOTE — PROGRESS NOTES
PT Re-Evaluation     Today's date: 2025  Patient name: Valentin Reid  : 1980  MRN: [id number]  Referring provider: Kamron Pedro, *  Dx:   Encounter Diagnosis     ICD-10-CM    1. Lateral epicondylitis of right elbow  M77.11               Start Time: 1308  Stop Time: 1347  Total time in clinic (min): 39 minutes    Assessment  Impairments: abnormal muscle firing, abnormal muscle tone, abnormal or restricted ROM, abnormal movement, impaired physical strength, lacks appropriate home exercise program, pain with function and poor posture   Symptom irritability: low    Assessment details: Valentin Reid is a pleasant 44 y.o. male who presents to PT Reevaluation s/p right extensor carpiradialis brevis repair.  He has been compliant with attending PT and home exercise program since initial eval. He has made mild improvements in subjective and objective data since initial eval but continues to have limitations compared to prior level of function. Valentin reports about 90% improvement since beginning PT and still struggles pinching and manipulating tools.  He is still progress with therapeutic and functional tolerance and continues to have deficits in the above listed impairments and would benefit from additional skilled PT to address these deficits to return to prior level of function. His  strength has return and surpassed healthy side.    Understanding of Dx/Px/POC: good     Prognosis: good    Goals  Impairment Goals 4-6 weeks  - Decrease pain to 2/10 MET  - Improve wrist AROM to equal to the unaffected upper extremity MET  - Increase wrist strength to 5/5 throughout MET  - Increase scapular strength to 5/5 throughout MEt    Functional Goals 6-8 weeks  - Return to Prior Level of Function  - Patient will be independent with HEP  - Patient will be able to reach overhead without increased pain/compensation/difficulty  - Patient will be able to reach behind back without increased  pain/compensation/difficulty   - Patient will be able to wash hair without increased pain/compensation/difficulty   - Patient will be able to lift >25 pounds with proper mechanics        Plan  Patient would benefit from: skilled physical therapy  Planned modality interventions: cryotherapy, TENS, thermotherapy: hydrocollator packs and unattended electrical stimulation    Planned therapy interventions: abdominal trunk stabilization, behavior modification, body mechanics training, breathing training, flexibility, functional ROM exercises, home exercise program, joint mobilization, manual therapy, massage, Mario taping, muscle pump exercises, neuromuscular re-education, patient education, postural training, strengthening, stretching, therapeutic activities, therapeutic exercise and therapeutic training    Frequency: 2x week  Duration in weeks: 8  Treatment plan discussed with: patient  Plan details: Prognosis above is given PT services 2x/week tapering to 1x/week over the next 2 months and home program adherence.      Subjective Evaluation    History of Present Illness  Mechanism of injury: WORK/SCHOOL:  in [place], heavy twisting   HOME LIFE:   HOBBIES/EXERCISE:   PLOF:    HISTORY OF CURRENT INJURY:  First noticed last year with lifting and wrist extension.  Lasted entire summer, had cortisone shot and felt great for w months then came back when he was using the shovel.  Two weeks was lifting heavy and had significant pain again.  Saw doc and received ECRB repair and scar tissue debridement on 25th of February.  Was in pain a couple of days.  Presenting to PT eval at end of 6th week (functional phase)  PAIN LOCATION/DESCRIPTORS: Right later elbow  AGGRAVATING FACTORS:  wrist extensors with fingers extended picking things up  EASES: rest  DAY PATTERN:  IMAGING:    SPECIAL QUESTIONS:    Valentin denies a new onset of Tingling and Numbness.  PATIENT GOALS: Get back to work    ReEval 5/7/2025    Patient only  experiences soreness or discomfort that he describes as a weakness following by pain specific to his elbow during activities, feels fine until reaching or pushing himself.  He endorses improvement in strength since starting PT and reports some progress but hard to for him to pinpoint or articulate the progress.  Able to report 70% improvement since starting, the last 30% would be able to reach up overhead (R) without feeling tightness or pain, especially with objects lifting overhead.  Comments on also improvement with supination/pronation tolerance.  Being able to withstand longer durations of time     2025 Re-Eval: Absolution of pain, just tightness and weakness.  Still feeling dorsal arm things, but feels gripping strength is better.  Demonstrates gripping in different ranges. Has bought digital based strengthening bands which is continueing to help.  Able to hold a weed-whacker with no issue (before couldn't), Reminds of comparable sign of shoveling, hasn't done it yet but will in future and see how it feels.       Patient Goals  Patient goals for therapy: decreased pain, increased strength, return to sport/leisure activities, increased motion, independence with ADLs/IADLs and return to work    Pain  Current pain ratin  At best pain ratin  At worst pain ratin  Quality: sharp and tight        Objective     Observations     Right Elbow   Positive for incision.     Neurological Testing     Sensation     Elbow   Left Elbow   Intact: light touch    Right Elbow   Intact: light touch    Reflexes   Left   Biceps (C5/C6): normal (2+)    Active Range of Motion     Right Elbow   Flexion: WFL  Extension: WFL  Forearm supination: WFL  Forearm pronation: WFL    Strength/Myotome Testing     Right Elbow   Flexion: 5  Extension: 4+  Forearm supination: 5  Forearm pronation: 5    Additional Strength Details  L  100lb > 90 > 120 lb  Rgrip 25lb > 45 lb > 106 lb    Tests     Right Elbow   Negative Cozen's.  "                        POC Expires Auth Status Start Date Expiration Date PT Visit Limit           Date   4/4     Used 1       Remaining 46          Diagnosis: R ECRB repair   Precautions: n/a   Primary Goals: Wrist ext strength, flexibility, , \"sprint repetitions to fatigue without pain\"  Return to sport week 12 (eval at week 6)   *asterisks by exercise = given for HEP   Manuals 6/11 6/13 6/16 6/3 6/5   IASTM  5 min Lateral right dorsal forarm to insertion   DO FOTO  JW   Manual Resisted ecc                                There Ex         Strength*        Wrist FLX/EXT isotonics*        Ext eccentrics *   10# x30       10 # 3x20 As many reps as possible to fatigue 10 #  X36  x42    Flexbar eccentric extension 2x10   Sup - pro     hammer 4#2x10   RD UD*     Hammer 4#2x10   Scap stab  Closed chain sap push ups, wall, unstable surface      Wrist Extnesion standing table press into extension S 10\" x10       Wrist Flexion (wrist ext) S   10# x30    10# x20   Bicep curl        TB wrist ext isometrics (horizontal)     X10 ea  Horizontal   45 deg   vertical   Spherical Squeeze    Blue Med ball    Pinch    1 lap fleixon 1 laps AB, cone pinching  5# weight 8# 3 laps  5# 3 laps     Hook         Cylindrical         Finger Tip         Delt raises (Ant, 45, Lateral) ea        Delt raise        Towel  squeeze with tb resisted abduction        Neuro Re-Ed        Wrist isometrics  Elbow 90*        Isometric elbow extension        Tricep Pull down rope pull apart        Nerve Glides (ulnar, median, radial)        Flex bar Ecc ext green x20 RD/UD 30\" x2      Hammer AROM sup-pro / rd-ud        Isometrics        AROM        TrX rows        Standing tb scap strengthening series (rows, sapd, T's_    X10   Punnching x10    Hi Rows        Patient education         Re-evaluation   JW      Ther Act         Up/down fence painting stretch x20   x20     Curl + OH Press   Curl 10# x10 palm up / palm down ea   "   UBE Lvl 7 2/2 Lvl 7 2'/2' Lvl 8 2/2 Lvl 8 2 min ea Lvl 8  2 min ea   DB - Tb UD        DB  Tb Supination        Bar- 4 Cone Drill (Pronation, UD,RD, Supination x10   1# CW on big stick 6 laps    KB RD  26, up, down, side side, KB gripping      KB Sup-pronation     See above   Bar whirl arounds        Ball Let go and grabs Yellow x20   Yellow 2x10    BALL Smash Roll outs Yellow x20   X20 blue ball cc/cw    Seated ER at 90     10# x10 with OH press    Modalities

## 2025-06-16 NOTE — LETTER
2025    Kamron Pedro MD  [address] [place] [id number]    Patient: Valentin Reid   YOB: 1980   Date of Visit: 2025     Encounter Diagnosis     ICD-10-CM    1. Lateral epicondylitis of right elbow  M77.11           Dear Dr. Kamron Pedro MD:    Thank you for your recent referral of Valentin Reid. Please review the attached evaluation summary from Valentin's recent visit.     Please verify that you agree with the plan of care by signing the attached order.     If you have any questions or concerns, please do not hesitate to call.     I sincerely appreciate the opportunity to share in the care of one of your patients and hope to have another opportunity to work with you in the near future.       Sincerely,    Elvin Tinajero, PT      Referring Provider:      I certify that I have read the below Plan of Care and certify the need for these services furnished under this plan of treatment while under my care.                    Kamron Pedro MD  [address] [place] [id number]  Via Fax: [phone]          PT Re-Evaluation     Today's date: 2025  Patient name: Valentin Reid  : 1980  MRN: [id number]  Referring provider: Kamron Pedro, *  Dx:   Encounter Diagnosis     ICD-10-CM    1. Lateral epicondylitis of right elbow  M77.11               Start Time: 1308  Stop Time: 1347  Total time in clinic (min): 39 minutes    Assessment  Impairments: abnormal muscle firing, abnormal muscle tone, abnormal or restricted ROM, abnormal movement, impaired physical strength, lacks appropriate home exercise program, pain with function and poor posture   Symptom irritability: low    Assessment details: Valentin Reid is a pleasant 44 y.o. male who presents to PT Reevaluation s/p right extensor carpiradialis brevis repair.  He has been compliant with attending PT and home exercise program since initial  eval. He has made mild improvements in subjective and objective data since initial eval but continues to have limitations compared to prior level of function. Valentin reports about 90% improvement since beginning PT and still struggles pinching and manipulating tools.  He is still progress with therapeutic and functional tolerance and continues to have deficits in the above listed impairments and would benefit from additional skilled PT to address these deficits to return to prior level of function. His  strength has return and surpassed healthy side.    Understanding of Dx/Px/POC: good     Prognosis: good    Goals  Impairment Goals 4-6 weeks  - Decrease pain to 2/10 MET  - Improve wrist AROM to equal to the unaffected upper extremity MET  - Increase wrist strength to 5/5 throughout MET  - Increase scapular strength to 5/5 throughout MEt    Functional Goals 6-8 weeks  - Return to Prior Level of Function  - Patient will be independent with HEP  - Patient will be able to reach overhead without increased pain/compensation/difficulty  - Patient will be able to reach behind back without increased pain/compensation/difficulty   - Patient will be able to wash hair without increased pain/compensation/difficulty   - Patient will be able to lift >25 pounds with proper mechanics        Plan  Patient would benefit from: skilled physical therapy  Planned modality interventions: cryotherapy, TENS, thermotherapy: hydrocollator packs and unattended electrical stimulation    Planned therapy interventions: abdominal trunk stabilization, behavior modification, body mechanics training, breathing training, flexibility, functional ROM exercises, home exercise program, joint mobilization, manual therapy, massage, Mario taping, muscle pump exercises, neuromuscular re-education, patient education, postural training, strengthening, stretching, therapeutic activities, therapeutic exercise and therapeutic training    Frequency: 2x  week  Duration in weeks: 8  Treatment plan discussed with: patient  Plan details: Prognosis above is given PT services 2x/week tapering to 1x/week over the next 2 months and home program adherence.      Subjective Evaluation    History of Present Illness  Mechanism of injury: WORK/SCHOOL:  in [place], heavy twisting   HOME LIFE:   HOBBIES/EXERCISE:   PLOF:    HISTORY OF CURRENT INJURY:  First noticed last year with lifting and wrist extension.  Lasted entire summer, had cortisone shot and felt great for w months then came back when he was using the shovel.  Two weeks was lifting heavy and had significant pain again.  Saw doc and received ECRB repair and scar tissue debridement on 25th of February.  Was in pain a couple of days.  Presenting to PT eval at end of 6th week (functional phase)  PAIN LOCATION/DESCRIPTORS: Right later elbow  AGGRAVATING FACTORS:  wrist extensors with fingers extended picking things up  EASES: rest  DAY PATTERN:  IMAGING:    SPECIAL QUESTIONS:    Valentin denies a new onset of Tingling and Numbness.  PATIENT GOALS: Get back to work    ReEval 5/7/2025    Patient only experiences soreness or discomfort that he describes as a weakness following by pain specific to his elbow during activities, feels fine until reaching or pushing himself.  He endorses improvement in strength since starting PT and reports some progress but hard to for him to pinpoint or articulate the progress.  Able to report 70% improvement since starting, the last 30% would be able to reach up overhead (R) without feeling tightness or pain, especially with objects lifting overhead.  Comments on also improvement with supination/pronation tolerance.  Being able to withstand longer durations of time     6/16/2025 Re-Eval: Absolution of pain, just tightness and weakness.  Still feeling dorsal arm things, but feels gripping strength is better.  Demonstrates gripping in different ranges. Has bought digital based strengthening  "bands which is continueing to help.  Able to hold a weed-whacker with no issue (before couldn't), Reminds of comparable sign of shoveling, hasn't done it yet but will in future and see how it feels.       Patient Goals  Patient goals for therapy: decreased pain, increased strength, return to sport/leisure activities, increased motion, independence with ADLs/IADLs and return to work    Pain  Current pain ratin  At best pain ratin  At worst pain ratin  Quality: sharp and tight        Objective     Observations     Right Elbow   Positive for incision.     Neurological Testing     Sensation     Elbow   Left Elbow   Intact: light touch    Right Elbow   Intact: light touch    Reflexes   Left   Biceps (C5/C6): normal (2+)    Active Range of Motion     Right Elbow   Flexion: WFL  Extension: WFL  Forearm supination: WFL  Forearm pronation: WFL    Strength/Myotome Testing     Right Elbow   Flexion: 5  Extension: 4+  Forearm supination: 5  Forearm pronation: 5    Additional Strength Details  L  100lb > 90 > 120 lb  Rgrip 25lb > 45 lb > 106 lb    Tests     Right Elbow   Negative Cozen's.                         POC Expires Auth Status Start Date Expiration Date PT Visit Limit           Date        Used 1       Remaining 46          Diagnosis: R ECRB repair   Precautions: n/a   Primary Goals: Wrist ext strength, flexibility, , \"sprint repetitions to fatigue without pain\"  Return to sport week 12 (eval at week 6)   *asterisks by exercise = given for HEP   Manuals  6/3 6/   IASTM  5 min Lateral right dorsal forarm to insertion   DO FOTO  JW   Manual Resisted ecc                                There Ex         Strength*        Wrist FLX/EXT isotonics*        Ext eccentrics *   10# x30       10 # 3x20 As many reps as possible to fatigue 10 #  X36  x42    Flexbar eccentric extension 2x10   Sup - pro     hammer 4#2x10   RD UD*     Hammer 4#2x10   Scap stab  Closed chain sap push ups, wall, " "unstable surface      Wrist Extnesion standing table press into extension S 10\" x10       Wrist Flexion (wrist ext) S   10# x30    10# x20   Bicep curl        TB wrist ext isometrics (horizontal)     X10 ea  Horizontal   45 deg   vertical   Spherical Squeeze    Blue Med ball    Pinch    1 lap fleixon 1 laps AB, cone pinching  5# weight 8# 3 laps  5# 3 laps     Hook         Cylindrical         Finger Tip         Delt raises (Ant, 45, Lateral) ea        Delt raise        Towel  squeeze with tb resisted abduction        Neuro Re-Ed        Wrist isometrics  Elbow 90*        Isometric elbow extension        Tricep Pull down rope pull apart        Nerve Glides (ulnar, median, radial)        Flex bar Ecc ext green x20 RD/UD 30\" x2      Hammer AROM sup-pro / rd-ud        Isometrics        AROM        TrX rows        Standing tb scap strengthening series (rows, sapd, T's_    X10   Punnching x10    Hi Rows        Patient education         Re-evaluation   JW      Ther Act         Up/down fence painting stretch x20   x20     Curl + OH Press   Curl 10# x10 palm up / palm down ea     UBE Lvl 7 2/2 Lvl 7 2'/2' Lvl 8 2/2 Lvl 8 2 min ea Lvl 8  2 min ea   DB - Tb UD        DB  Tb Supination        Bar- 4 Cone Drill (Pronation, UD,RD, Supination x10   1# CW on big stick 6 laps    KB RD  26, up, down, side side, KB gripping      KB Sup-pronation     See above   Bar whirl arounds        Ball Let go and grabs Yellow x20   Yellow 2x10    BALL Smash Roll outs Yellow x20   X20 blue ball cc/cw    Seated ER at 90     10# x10 with OH press    Modalities                                                                                       "

## 2025-06-17 ENCOUNTER — OFFICE VISIT (OUTPATIENT)
Dept: PHYSICAL THERAPY | Facility: CLINIC | Age: 45
End: 2025-06-17
Attending: ORTHOPAEDIC SURGERY
Payer: OTHER MISCELLANEOUS

## 2025-06-17 DIAGNOSIS — M77.11 LATERAL EPICONDYLITIS OF RIGHT ELBOW: Primary | ICD-10-CM

## 2025-06-17 PROCEDURE — 97140 MANUAL THERAPY 1/> REGIONS: CPT

## 2025-06-17 PROCEDURE — 97112 NEUROMUSCULAR REEDUCATION: CPT

## 2025-06-17 PROCEDURE — 97110 THERAPEUTIC EXERCISES: CPT

## 2025-06-17 NOTE — PROGRESS NOTES
"Daily Note     Today's date: 2025  Patient name: Valentin Reid  : 1980  MRN: [id number]  Referring provider: Kamron Pedro, *  Dx:   Encounter Diagnosis     ICD-10-CM    1. Lateral epicondylitis of right elbow  M77.11           Start Time: 0700  Stop Time: 0745  Total time in clinic (min): 45 minutes    Subjective: No new complaints      Objective: See treatment diary below      Assessment: Tolerated treatment well. Engaged in soft tissue mobilization and wrist - elbow stabilization training with various  styles.  Given HEP while he he is on his trip.  Slight hand and wrist cramping.  Experience some mild apprehension/anxiety with 40# dumbell bilateral farmers carries today, regress load and unilateralize for next visit.  Patient demonstrated fatigue post treatment, exhibited good technique with therapeutic exercises, and would benefit from continued PT      Plan: Continue per plan of care.         POC Expires Auth Status Start Date Expiration Date PT Visit Limit           Date        Used 1       Remaining 46          Diagnosis: R ECRB repair   Precautions: n/a   Primary Goals: Wrist ext strength, flexibility, , \"sprint repetitions to fatigue without pain\"  Return to sport week 12 (eval at week 6)   *asterisks by exercise = given for HEP   Manuals    IASTM  5 min Lateral right dorsal forarm to insertion 5 min Lateral right dorsal forarm to insertion  DO FOTO  JW   Manual Resisted ecc                                There Ex         Strength*   [place] Carries 40# (anxiety)     Wrist FLX/EXT isotonics*        Ext eccentrics *   10# x30      10# 2x20 10 # 3x20 As many reps as possible to fatigue 10 #  X36  x42    Flexbar eccentric extension 2x10   Sup - pro     hammer 4#2x10   RD UD*     Hammer 4#2x10   Scap stab  Closed chain sap push ups, wall, unstable surface x10     Wrist Extnesion standing table press into extension S 10\" x10  x1min     Wrist " "Flexion (wrist ext) S   10# x30    10# x20   Bicep curl        TB wrist ext isometrics (horizontal)     X10 ea  Horizontal   45 deg   vertical   Spherical Squeeze   KB Squeezes 2x10 3\" hold Blue Med ball    Pinch    1 lap fleixon 1 laps AB, cone pinching  5# weight      Hook         Cylindrical         Finger Tip         Delt raises (Ant, 45, Lateral) ea        Delt raise        Towel  squeeze with tb resisted abduction        Neuro Re-Ed        Wrist isometrics  Elbow 90*        Isometric elbow extension        Tricep Pull down rope pull apart        Nerve Glides (ulnar, median, radial)        Flex bar Ecc ext green x20 RD/UD 30\" x2      Hammer AROM sup-pro / rd-ud        Isometrics        AROM        TrX rows        Standing tb scap strengthening series (rows, sapd, T's_    X10   Punnching x10    Hi Rows        Patient education         Re-evaluation         Ther Act         Up/down fence painting stretch x20   x20     Curl + OH Press        UBE Lvl 7 2/2 Lvl 7 2'/2' Lvl 8 2/2/ Lvl 8 2 min ea Lvl 8  2 min ea   DB - Tb UD        DB  Tb Supination        Bar- 4 Cone Drill (Pronation, UD,RD, Supination x10   1# CW on big stick 6 laps    KB RD  26, up, down, side side, KB gripping 26, up, down, side side, KB gripping     KB Sup-pronation     See above   Bar whirl arounds        Ball Let go and grabs Yellow x20   Yellow 2x10    BALL Smash Roll outs Yellow x20  X20 cc/cw ea X20 blue ball cc/cw    Seated ER at 90     10# x10 with OH press    Modalities                                                                       "

## 2025-07-03 ENCOUNTER — OFFICE VISIT (OUTPATIENT)
Dept: PHYSICAL THERAPY | Facility: CLINIC | Age: 45
End: 2025-07-03
Attending: ORTHOPAEDIC SURGERY
Payer: OTHER MISCELLANEOUS

## 2025-07-03 DIAGNOSIS — M77.11 LATERAL EPICONDYLITIS OF RIGHT ELBOW: Primary | ICD-10-CM

## 2025-07-03 PROCEDURE — 97110 THERAPEUTIC EXERCISES: CPT

## 2025-07-03 PROCEDURE — 97530 THERAPEUTIC ACTIVITIES: CPT

## 2025-07-03 PROCEDURE — 97112 NEUROMUSCULAR REEDUCATION: CPT

## 2025-07-03 NOTE — PROGRESS NOTES
"Daily Note     Today's date: 7/3/2025  Patient name: Valentin Reid  : 1980  MRN: [id number]  Referring provider: Kamron Pedro, *  Dx:   Encounter Diagnosis     ICD-10-CM    1. Lateral epicondylitis of right elbow  M77.11           Start Time: 0830  Stop Time: 0945  Total time in clinic (min): 75 minutes    Subjective: Patient reports doing very well with HEP over his trip,       Objective: See treatment diary below      Assessment: Tolerated treatment well. Progressing wrist and elbow loading with ballistic strengthening, catching/throwing, and varied  with changing carpi extensor muscle group.  Patient demonstrated fatigue post treatment, exhibited good technique with therapeutic exercises, and would benefit from continued PT      Plan: Continue per plan of care.         POC Expires Auth Status Start Date Expiration Date PT Visit Limit           Date        Used 1       Remaining 46          Diagnosis: R ECRB repair   Precautions: n/a   Primary Goals: Wrist ext strength, flexibility, , \"sprint repetitions to fatigue without pain\"  Return to sport week 12 (eval at week 6)   *asterisks by exercise = given for HEP   Manuals 6/11 6/13 6/17 7/3 6   IASTM  5 min Lateral right dorsal forarm to insertion 5 min Lateral right dorsal forarm to insertion  DO FOTO  JW   Manual Resisted ecc                                There Ex         Strength*   [place] Carries 40# (anxiety)     Wrist FLX/EXT isotonics*        Ext eccentrics *   10# x30      10# 2x20 10 # 3x20 As many reps as possible to fatigue 10 #  X36  x42    Flexbar eccentric extension 2x10   Sup - pro     hammer 4#2x10   RD UD*     Hammer 4#2x10   Scap stab  Closed chain sap push ups, wall, unstable surface x10     Wrist Extnesion standing table press into extension S 10\" x10  x1min     Wrist Flexion (wrist ext) S   10# x30    10# x20   Bicep curl        TB wrist ext isometrics (horizontal)     X10 ea  Horizontal   45 deg " "  vertical   Spherical Squeeze   KB Squeezes 2x10 3\" hold Blue Med ball    Pinch    1 lap fleixon 1 laps AB, cone pinching  5# weight  Cone 3# abducion x20    Hook         Cylindrical         Finger Tip         Delt raises (Ant, 45, Lateral) ea    x29    Delt raise        Towel  squeeze with tb resisted abduction    x20    Neuro Re-Ed        Wrist isometrics  Elbow 90*        Isometric elbow extension        Tricep Pull down rope pull apart        Nerve Glides (ulnar, median, radial)        Flex bar Ecc ext green x20 RD/UD 30\" x2      Hammer AROM sup-pro / rd-ud    X50 ea    Isometrics        AROM        TrX rows        Standing tb scap strengthening series (rows, sapd, T's_    X10   Punnching x10    Hi Rows        Patient education        Catch and toss    30 overhand  30underhand     Re-evaluation         Ther Act         Up/down fence painting stretch x20   x20     Curl + OH Press        UBE Lvl 7 2/2 Lvl 7 2'/2' Lvl 8 2/2/ Lvl 8 2 min ea Lvl 8  2 min ea   DB - Tb UD        DB  Tb Supination        Bar- 4 Cone Drill (Pronation, UD,RD, Supination x10   1# CW on big stick 6 laps    KB RD  26, up, down, side side, KB gripping 26, up, down, side side, KB gripping     KB Sup-pronation     See above   Bar whirl arounds        Ball Let go and grabs Yellow x20   Yellow 2x10    BALL Smash Roll outs Yellow x20  X20 cc/cw ea X20 blue ball cc/cw    Seated ER at 90     10# x10 with OH press    Modalities                                                                         "

## 2025-07-07 ENCOUNTER — OFFICE VISIT (OUTPATIENT)
Dept: PHYSICAL THERAPY | Facility: CLINIC | Age: 45
End: 2025-07-07
Attending: ORTHOPAEDIC SURGERY
Payer: OTHER MISCELLANEOUS

## 2025-07-07 DIAGNOSIS — M77.11 LATERAL EPICONDYLITIS OF RIGHT ELBOW: Primary | ICD-10-CM

## 2025-07-07 PROCEDURE — 97110 THERAPEUTIC EXERCISES: CPT

## 2025-07-07 PROCEDURE — 97530 THERAPEUTIC ACTIVITIES: CPT

## 2025-07-07 PROCEDURE — 97112 NEUROMUSCULAR REEDUCATION: CPT

## 2025-07-07 NOTE — PROGRESS NOTES
"Daily Note     Today's date: 2025  Patient name: Valentin Reid  : 1980  MRN: [id number]  Referring provider: Kamron Pedro, *  Dx:   Encounter Diagnosis     ICD-10-CM    1. Lateral epicondylitis of right elbow  M77.11             Start Time: 07  Stop Time: 829  Total time in clinic (min): 41 minutes    Subjective: Patient reports some continued R UE and thumb cramping when gripping objects, especially with increasing weight/for a prolonged duration of time.      Objective: See treatment diary below      Assessment: Tolerated treatment well. Cont with current POC focused on R elbow/forearm strengthening, mobility, and stability exercises to promote improvements in remaining deficits, with good tolerance despite moderate increases in fatigue levels. Patient experienced onset of R thumb pain and cramping sensations when performing exercises involving gripping with increased loading of muscles, therefore required increased rest breaks to prevent any further exacerbations of sxs from occurring t/o exercises. Cont to progress as able. Patient demonstrated fatigue post treatment, exhibited good technique with therapeutic exercises, and would benefit from continued PT      Plan: Continue per plan of care.         POC Expires Auth Status Start Date Expiration Date PT Visit Limit           Date        Used 1       Remaining 46          Diagnosis: R ECRB repair   Precautions: n/a   Primary Goals: Wrist ext strength, flexibility, , \"sprint repetitions to fatigue without pain\"  Return to sport week 12 (eval at week 6)   *asterisks by exercise = given for HEP   Manuals 6/11 6/13 6/17 7/3 7/7   IASTM  5 min Lateral right dorsal forarm to insertion 5 min Lateral right dorsal forarm to insertion     Manual Resisted ecc                                There Ex         Strength*   [place] Carries 40# (anxiety)     Wrist FLX/EXT isotonics*        Ext eccentrics *   10# x30      10# 2x20 10 # 3x20 " "10 # 3x20   Sup - pro        RD UD*        Scap stab  Closed chain sap push ups, wall, unstable surface x10     Wrist Extnesion standing table press into extension S 10\" x10  x1min     Wrist Flexion (wrist ext) S   10# x30       Bicep curl        TB wrist ext isometrics (horizontal)        Spherical Squeeze   KB Squeezes 2x10 3\" hold Blue Med ball YMB x30   Pinch    1 lap fleixon 1 laps AB, cone pinching  5# weight  Cone 3# abducion x20 Cone 3# abducion x20   Hook         Cylindrical         Finger Tip         Delt raises (Ant, 45, Lateral) ea    x29    Delt raise        Towel  squeeze with tb resisted abduction    x20    Neuro Re-Ed        Wrist isometrics  Elbow 90*        Isometric elbow extension        Tricep Pull down rope pull apart        Nerve Glides (ulnar, median, radial)        Flex bar Ecc ext green x20 RD/UD 30\" x2   W/ blue RD/UD x1 min ea   Hammer AROM sup-pro / rd-ud    X50 ea W/ big stick x50ea   Isometrics        AROM        TrX rows        Standing tb scap strengthening series (rows, sapd, T's_    X10   Punnching x10    Hi Rows        Patient education        Catch and toss    30 overhand  30underhand     Re-evaluation         Ther Act         Up/down fence painting stretch x20   x20     Curl + OH Press        UBE Lvl 7 2/2 Lvl 7 2'/2' Lvl 8 2/2/ Lvl 8 2 min ea Lvl 8.8 2 min ea   DB - Tb UD        DB  Tb Supination        Bar- 4 Cone Drill (Pronation, UD,RD, Supination x10   1# CW on big stick 6 laps W/ big stick x20ea    KB RD  26, up, down, side side, KB gripping 26, up, down, side side, KB gripping     KB Sup-pronation        Bar whirl arounds        Ball Let go and grabs Yellow x20   Yellow 2x10 YMB 3x10   BALL Smash Roll outs Yellow x20  X20 cc/cw ea X20 blue ball cc/cw x20 blue ball cc/cw   Seated ER at 90     10# x10 with OH press 10# x10 w/ OH press   Modalities                                                                         "

## 2025-07-08 ENCOUNTER — OFFICE VISIT (OUTPATIENT)
Dept: PHYSICAL THERAPY | Facility: CLINIC | Age: 45
End: 2025-07-08
Attending: ORTHOPAEDIC SURGERY
Payer: OTHER MISCELLANEOUS

## 2025-07-08 DIAGNOSIS — M77.11 LATERAL EPICONDYLITIS OF RIGHT ELBOW: Primary | ICD-10-CM

## 2025-07-08 PROCEDURE — 97112 NEUROMUSCULAR REEDUCATION: CPT

## 2025-07-08 PROCEDURE — 97110 THERAPEUTIC EXERCISES: CPT

## 2025-07-08 NOTE — PROGRESS NOTES
"Daily Note     Today's date: 2025  Patient name: Valentin Reid  : 1980  MRN: [id number]  Referring provider: Kamron Pedro, *  Dx:   Encounter Diagnosis     ICD-10-CM    1. Lateral epicondylitis of right elbow  M77.11           Start Time: 1500  Stop Time: 1545  Total time in clinic (min): 45 minutes    Subjective: Reports doing a lot of yard work over the weekend and feeling the muscle's feeling fatigued, denies sharp pain.      Objective: See treatment diary below      Assessment: Tolerated treatment well. Trained abductor policis brevis and long to differentiate  strength/weakness's.  Doing well with progressional overload and shaping nicely for follow up with otho next week.  Patient demonstrated fatigue post treatment, exhibited good technique with therapeutic exercises, and would benefit from continued PT      Plan: Continue per plan of care.         POC Expires Auth Status Start Date Expiration Date PT Visit Limit           Date        Used 1       Remaining 46          Diagnosis: R ECRB repair   Precautions: n/a   Primary Goals: Wrist ext strength, flexibility, , \"sprint repetitions to fatigue without pain\"  Return to sport week 12 (eval at week 6)   *asterisks by exercise = given for HEP   Manuals 7/8 6/13 6/17 7/3 7/7   IASTM  5 min Lateral right dorsal forarm to insertion 5 min Lateral right dorsal forarm to insertion     Manual Resisted ecc                                There Ex         Strength*   [place] Carries 40# (anxiety)     Wrist FLX/EXT isotonics*        Ext eccentrics *   10# 2x20 10 # 3x20 10 # 3x20   Sup - pro        RD UD*        Scap stab x20 Closed chain sap push ups, wall, unstable surface x10     Wrist Extnesion standing table press into extension S   x1min     Wrist Flexion (wrist ext) S        Bicep curl        TB wrist ext isometrics (horizontal)        Spherical Squeeze   KB Squeezes 2x10 3\" hold Blue Med ball YMB x30   Pinch    1 lap " "fleixon 1 laps AB, cone pinching  5# weight  Cone 3# abducion x20 Cone 3# abducion x20   Hook         Cylindrical         Finger Tip  Putty lumbrical  x20        Delt raises (Ant, 45, Lateral) ea    x29    Delt raise        Towel  squeeze with tb resisted abduction    x20    Neuro Re-Ed        Wrist isometrics  Elbow 90*        Isometric elbow extension        Tricep Pull down rope pull apart        Nerve Glides (ulnar, median, radial)        Flex bar Wrist flx/ext x12 ea RD/UD 30\" x2   W/ blue RD/UD x1 min ea   Hammer AROM sup-pro / rd-ud    X50 ea W/ big stick x50ea   Isometrics        AROM        TrX rows        Standing tb scap strengthening series (rows, sapd, T's_ x10   X10   Punnching x10    Hi Rows        Patient education        Catch and toss Yed medball x20   30 overhand  30underhand     Re-evaluation         Ther Act         Up/down fence painting stretch    x20     Curl + OH Press        UBE  Lvl 7 2'/2' Lvl 8 2/2/ Lvl 8 2 min ea Lvl 8.8 2 min ea   DB - Tb UD        DB  Tb Supination        Bar- 4 Cone Drill (Pronation, UD,RD, Supination x10   1# CW on big stick 6 laps W/ big stick x20ea    KB RD 10# sup/ 26, up, down, side side, KB gripping 26, up, down, side side, KB gripping     KB Sup-pronation        Bar whirl arounds        Ball Let go and grabs    Yellow 2x10 YMB 3x10   BALL Smash Roll outs   X20 cc/cw ea X20 blue ball cc/cw x20 blue ball cc/cw   Seated ER at 90     10# x10 with OH press 10# x10 w/ OH press   Modalities                                                                           "

## 2025-07-11 ENCOUNTER — OFFICE VISIT (OUTPATIENT)
Dept: PHYSICAL THERAPY | Facility: CLINIC | Age: 45
End: 2025-07-11
Attending: ORTHOPAEDIC SURGERY
Payer: OTHER MISCELLANEOUS

## 2025-07-11 DIAGNOSIS — M77.11 LATERAL EPICONDYLITIS OF RIGHT ELBOW: Primary | ICD-10-CM

## 2025-07-11 PROCEDURE — 97110 THERAPEUTIC EXERCISES: CPT

## 2025-07-11 NOTE — PROGRESS NOTES
"Daily Note     Today's date: 2025  Patient name: Valentin Reid  : 1980  MRN: [id number]  Referring provider: Kamron Pedro, *  Dx:   Encounter Diagnosis     ICD-10-CM    1. Lateral epicondylitis of right elbow  M77.11           Start Time: 1000  Stop Time: 1045  Total time in clinic (min): 45 minutes    Subjective: Patient feels good activation with new activity inducing wrist extension isometrics coupled with stretches with physio-ball on wall.      Objective: See treatment diary below      Assessment: Tolerated treatment well. Patient demonstrated fatigue post treatment, exhibited good technique with therapeutic exercises, and would benefit from continued PT      Plan: Continue per plan of care.         POC Expires Auth Status Start Date Expiration Date PT Visit Limit           Date        Used 1       Remaining 46          Diagnosis: R ECRB repair   Precautions: n/a   Primary Goals: Wrist ext strength, flexibility, , \"sprint repetitions to fatigue without pain\"  Return to sport week 12 (eval at week 6)   *asterisks by exercise = given for HEP   Manuals 7/8 7/11 6/17 7/3 7/7   IASTM  5 min Lateral right dorsal forarm to insertion 5 min Lateral right dorsal forarm to insertion     Manual Resisted ecc                                There Ex         Strength*   [place]rill Carries 40# (anxiety)     Wrist FLX/EXT isotonics*        Ext eccentrics *   10# 2x20 10 # 3x20 10 # 3x20   Sup - pro        RD UD*        Scap stab x20  x10     Wrist Extnesion standing table press into extension S   x1min     Wrist Flexion (wrist ext) S        Bicep curl        TB wrist ext isometrics (horizontal)        Spherical Squeeze   KB Squeezes 2x10 3\" hold Blue Med ball YMB x30   Pinch    Cone 3# pinch  - let go and grabs 2x10    FF raise with pinch  2x10 3# Cone  Cone 3# abducion x20 Cone 3# abducion x20   Hook         Cylindrical         Finger Tip  Putty lumbrical  x20      "   Delt raises (Ant, 45, Lateral) ea    x29    Delt raise        Towel  squeeze with tb resisted abduction    x20    Neuro Re-Ed        Pronated Curl at heaven with long stick  4x10 15#      Wrist isometrics  Elbow 90*        Isometric elbow extension        Tricep Pull down rope pull apart        Nerve Glides (ulnar, median, radial)        Flex bar Wrist flx/ext x12 ea    W/ blue RD/UD x1 min ea   Hammer AROM sup-pro / rd-ud    X50 ea W/ big stick x50ea   Isometrics  Pball Wall wrist extension -> roll up into wrist flexion and shoulde flexion      AROM        TrX rows        Standing tb scap strengthening series (rows, sapd, T's_ x10   X10   Punnching x10    Hi Rows        Patient education        Catch and toss Yed medball x20 X20 yellow  X20 pump fake  30 overhand  30underhand     Re-evaluation         Ther Act         Up/down fence painting stretch    x20     Curl + OH Press        UBE  Lvl 7 2'/2' Lvl 8 2/2/ Lvl 8 2 min ea Lvl 8.8 2 min ea   DB - Tb UD        DB  Tb Supination        Bar- 4 Cone Drill (Pronation, UD,RD, Supination x10   1# CW on big stick 6 laps W/ big stick x20ea    KB RD 10# sup/  26, up, down, side side, KB gripping     KB Sup-pronation        Bar whirl arounds        Ball Let go and grabs    Yellow 2x10 YMB 3x10   BALL Smash Roll outs   X20 cc/cw ea X20 blue ball cc/cw x20 blue ball cc/cw   Seated ER at 90     10# x10 with OH press 10# x10 w/ OH press   Modalities

## 2025-07-14 ENCOUNTER — APPOINTMENT (OUTPATIENT)
Dept: PHYSICAL THERAPY | Facility: CLINIC | Age: 45
End: 2025-07-14
Attending: ORTHOPAEDIC SURGERY
Payer: OTHER MISCELLANEOUS

## 2025-07-15 ENCOUNTER — EVALUATION (OUTPATIENT)
Dept: PHYSICAL THERAPY | Facility: CLINIC | Age: 45
End: 2025-07-15
Attending: ORTHOPAEDIC SURGERY
Payer: OTHER MISCELLANEOUS

## 2025-07-15 DIAGNOSIS — M77.11 LATERAL EPICONDYLITIS OF RIGHT ELBOW: Primary | ICD-10-CM

## 2025-07-15 PROCEDURE — 97110 THERAPEUTIC EXERCISES: CPT

## 2025-07-15 PROCEDURE — 97112 NEUROMUSCULAR REEDUCATION: CPT

## 2025-07-15 PROCEDURE — 97140 MANUAL THERAPY 1/> REGIONS: CPT

## 2025-07-17 ENCOUNTER — OFFICE VISIT (OUTPATIENT)
Dept: PHYSICAL THERAPY | Facility: CLINIC | Age: 45
End: 2025-07-17
Attending: ORTHOPAEDIC SURGERY
Payer: OTHER MISCELLANEOUS

## 2025-07-17 DIAGNOSIS — M77.11 LATERAL EPICONDYLITIS OF RIGHT ELBOW: Primary | ICD-10-CM

## 2025-07-17 PROCEDURE — 97530 THERAPEUTIC ACTIVITIES: CPT | Performed by: PHYSICAL THERAPIST

## 2025-07-17 PROCEDURE — 97112 NEUROMUSCULAR REEDUCATION: CPT | Performed by: PHYSICAL THERAPIST

## 2025-07-17 PROCEDURE — 97110 THERAPEUTIC EXERCISES: CPT | Performed by: PHYSICAL THERAPIST

## 2025-07-17 NOTE — PROGRESS NOTES
"Daily Note     Today's date: 2025  Patient name: Valentin Reid  : 1980  MRN: [id number]  Referring provider: Kamron Pedro, *  Dx:   Encounter Diagnosis     ICD-10-CM    1. Lateral epicondylitis of right elbow  M77.11           Start Time: 1600  Stop Time: 1642  Total time in clinic (min): 42 minutes    Subjective: Patient reports he has no pain today. He is going back to work next week.    Objective: See treatment diary below    Assessment: Tolerated treatment well and noted fatigue in the forearm following visit. Patient exhibited good technique with therapeutic exercises and enjoyed the flexbar cross and will trial this at home. Patient will be returning to work but will continue PT as scheduled.    Plan: Continue per plan of care.         POC Expires Auth Status Start Date Expiration Date PT Visit Limit           Date        Used 1       Remaining 46          Diagnosis: R ECRB repair   Precautions: n/a   Primary Goals: Wrist ext strength, flexibility, , \"sprint repetitions to fatigue without pain\"  Return to sport week 12 (eval at week 6)   *asterisks by exercise = given for HEP   Manuals 7/8 7/11 7/17 7/3 7/7   IASTM  5 min Lateral right dorsal forarm to insertion Defer, scab came off scar     Manual Resisted ecc                                There Ex         Strength*        Wrist FLX/EXT isotonics*        Ext eccentrics *    10 # 3x20 10 # 3x20   Sup - pro        RD UD*        Scap stab x20       Wrist Extnesion standing table press into extension S        Wrist Flexion (wrist ext) S        Bicep curl        TB wrist ext isometrics (horizontal)        Spherical Squeeze    Blue Med ball YMB x30   Pinch    Cone 3# pinch  - let go and grabs 2x10    FF raise with pinch  2x10 3# Cone Cone 3# pinch  - let go and grabs 2x10    FF raise with pinch  2x10 3# Cone Cone 3# abducion x20 Cone 3# abducion x20   Hook         Cylindrical         Finger Tip  " Putty lumbrical  x20        Delt raises (Ant, 45, Lateral) ea    x29    Delt raise        Towel  squeeze with tb resisted abduction    x20    Neuro Re-Ed        Pronated Curl at heaven with long stick  4x10 15# 2x10 15#     Wrist isometrics  Elbow 90*        Isometric elbow extension        Tricep Pull down rope pull apart        Nerve Glides (ulnar, median, radial)        Flex bar Wrist flx/ext x12 ea  Eccentrics 2x10 blue flexbar  W/ blue RD/UD x1 min ea   Hammer AROM sup-pro / rd-ud    X50 ea W/ big stick x50ea   Isometrics  Pball Wall wrist extension -> roll up into wrist flexion and shoulde flexion      AROM        TrX rows        Standing tb scap strengthening series (rows, sapd, T's_ x10   X10   Punnching x10    Hi Rows        Patient education        Catch and toss Yed medball x20 X20 yellow  X20 pump fake X20 yellow  X20 pump fake 30 overhand  30underhand    Body blade   30 sec x 2 90 flx     Statue [place] liberty        Flexbar cross   Green flexbar x 10      Re-evaluation         Ther Act         Up/down fence painting stretch    x20     Curl + OH Press        UBE  Lvl 7 2'/2' Lvl 7 2/2 Lvl 8 2 min ea Lvl 8.8 2 min ea   DB - Tb UD        DB  Tb Supination        Bar- 4 Cone Drill (Pronation, UD,RD, Supination x10   1# CW on big stick 6 laps W/ big stick x20ea    KB RD 10# sup/       KB Sup-pronation        Bar whirl arounds        Ball Let go and grabs   Yellow 2x10 Yellow 2x10 YMB 3x10   BALL Smash Roll outs   Yellow x 20 cw/ccw X20 blue ball cc/cw x20 blue ball cc/cw   Seated ER at 90   10# 2x10 w/ OH press  10# x10 with OH press 10# x10 w/ OH press   Modalities

## 2025-07-21 ENCOUNTER — APPOINTMENT (OUTPATIENT)
Dept: PHYSICAL THERAPY | Facility: CLINIC | Age: 45
End: 2025-07-21
Attending: ORTHOPAEDIC SURGERY
Payer: OTHER MISCELLANEOUS

## 2025-07-22 ENCOUNTER — OFFICE VISIT (OUTPATIENT)
Dept: URGENT CARE | Facility: CLINIC | Age: 45
End: 2025-07-22
Payer: COMMERCIAL

## 2025-07-22 ENCOUNTER — APPOINTMENT (OUTPATIENT)
Dept: PHYSICAL THERAPY | Facility: CLINIC | Age: 45
End: 2025-07-22
Attending: ORTHOPAEDIC SURGERY
Payer: OTHER MISCELLANEOUS

## 2025-07-22 VITALS
SYSTOLIC BLOOD PRESSURE: 117 MMHG | WEIGHT: 315 LBS | OXYGEN SATURATION: 99 % | HEART RATE: 104 BPM | RESPIRATION RATE: 14 BRPM | BODY MASS INDEX: 41.75 KG/M2 | TEMPERATURE: 98.4 F | HEIGHT: 73 IN | DIASTOLIC BLOOD PRESSURE: 75 MMHG

## 2025-07-22 DIAGNOSIS — K29.70 VIRAL GASTRITIS: Primary | ICD-10-CM

## 2025-07-22 PROCEDURE — 99213 OFFICE O/P EST LOW 20 MIN: CPT | Performed by: PHYSICIAN ASSISTANT

## 2025-07-22 RX ORDER — TIRZEPATIDE 10 MG/.5ML
INJECTION, SOLUTION SUBCUTANEOUS
COMMUNITY
Start: 2025-07-21

## 2025-07-22 NOTE — PROGRESS NOTES
[facility]  Name: Valentin Reid      : 1980      MRN: [id number]  Encounter Provider: Karen Jang PA-C  Encounter Date: 2025   Encounter department: [facility]  :  Assessment & Plan  Viral gastritis             Patient Instructions  Suspected viral gastritis    May use Immodium today and tomorrow if needed  Start with drinking clear liquids (i.e. Gatorade, Powerade, Pedialyte, etc but avoid red liquids).    You may advance to bland diet 6-8 hrs after last vomiting (ie. BRAT - bananas, rice, applesauce, toast) as tolerated.    Recommend avoiding milk products, spicy, greasy foods, and citrus products over the next 1-2 weeks. Advance diet as tolerated.    Follow up with your PCP in 1-2 days if symptoms persist as further testing may be warranted     Go to the ER if symptoms are worsening.     Follow up with PCP in 3-5 days.  Proceed to  ER if symptoms worsen.    If tests are performed, our office will contact you with results only if changes need to made to the care plan discussed with you at the visit. [facility]    Chief Complaint:   Chief Complaint   Patient presents with    Abdominal Pain     Pt reports of nausea /vomiting and diarrhea x 3 days.      History of Present Illness   Diarrhea   This is a new problem. Episode onset:  night. The problem occurs 5 to 10 times per day. The problem has been gradually improving. The stool consistency is described as Watery. The patient states that diarrhea awakens him from sleep. Associated symptoms include abdominal pain, bloating, chills, headaches, increased flatus and myalgias. Pertinent negatives include no arthralgias, coughing, fever, sweats, URI or vomiting. He has tried anti-motility drug for the symptoms. The treatment provided moderate relief.   Pt reports onset of watery diarrhea  evening.  He returned from [place] .  He states  he  did drink a smoothie with spinach and cucumbers from his neighbor.  He reports significant nausea but denies vomiting.  He also reports frequent belching and bloating with gas pains and abdominal discomfort.  Sunday night he reports BM's hourly.  Yesterday after taking Immodium frequency decreased to every 2-3 hrs and overnight q 3-4 hrs.  He has been drinking pedialyte and ate two bananas yesterday.  He denies rashes, known bites, cough, recent weight loss.        Review of Systems   Constitutional:  Positive for chills. Negative for fever.   HENT:  Negative for ear pain and sore throat.    Eyes:  Negative for pain and visual disturbance.   Respiratory:  Negative for cough and shortness of breath.    Cardiovascular:  Negative for chest pain and palpitations.   Gastrointestinal:  Positive for abdominal distention, abdominal pain, bloating, diarrhea, flatus and nausea. Negative for blood in stool and vomiting.   Genitourinary:  Negative for dysuria and hematuria.   Musculoskeletal:  Positive for myalgias. Negative for arthralgias and back pain.   Skin:  Negative for color change and rash.   Neurological:  Positive for headaches. Negative for seizures and syncope.   All other systems reviewed and are negative.    Past Medical History   Past Medical History[1]  Past Surgical History[2]  Family History[3]  he reports that he has never smoked. He has never used smokeless tobacco. He reports that he does not drink alcohol and does not use drugs.  Current Outpatient Medications   Medication Instructions    Aspirin Low Dose 81 MG EC tablet Pt not taking    atovaquone-proguanil (MALARONE) 250-100 mg 1 tablet, Oral, Daily with breakfast, Begin 1 day before entering malaria area and continue daily while in malaria area and for 1 week after leaving area    bisacodyl (DULCOLAX) 10 mg, Oral, Once    celecoxib (CeleBREX) 200 mg capsule     EPINEPHrine (EPIPEN) 0.3 mg, Intramuscular, Once    fluticasone (FLONASE) 50 mcg/act nasal  "spray 2 sprays, Daily    hydroCHLOROthiazide 12.5 mg, Daily    oxyCODONE (ROXICODONE) 5 immediate release tablet Pt not taking    polyethylene glycol (GOLYTELY) 4000 mL solution 4,000 mL, Oral, Once    Zepbound 10 MG/0.5ML auto-injector    Allergies[4]     Objective   /75   Pulse 104   Temp 98.4 °F (36.9 °C)   Resp 14   Ht 6' 1\" (1.854 m)   Wt (!) 143 kg (315 lb)   SpO2 99%   BMI 41.56 kg/m²      Physical Exam  Vitals and nursing note reviewed.   Constitutional:       General: He is not in acute distress.     Appearance: He is well-developed.   HENT:      Head: Normocephalic and atraumatic.     Eyes:      Conjunctiva/sclera: Conjunctivae normal.       Cardiovascular:      Rate and Rhythm: Normal rate and regular rhythm.      Heart sounds: No murmur heard.  Pulmonary:      Effort: Pulmonary effort is normal. No respiratory distress.      Breath sounds: Normal breath sounds.   Abdominal:      General: Abdomen is flat. Bowel sounds are increased.      Palpations: Abdomen is soft. There is no hepatomegaly, splenomegaly or mass.      Tenderness: There is no abdominal tenderness.     Musculoskeletal:         General: No swelling.      Cervical back: Neck supple.     Skin:     General: Skin is warm and dry.      Capillary Refill: Capillary refill takes less than 2 seconds.     Neurological:      Mental Status: He is alert.     Psychiatric:         Mood and Affect: Mood normal.         Portions of the record may have been created with voice recognition software.  Occasional wrong word or \"sound a like\" substitutions may have occurred due to the inherent limitations of voice recognition software.  Read the chart carefully and recognize, using context, where substitutions have occurred.         [1]   Past Medical History:  Diagnosis Date    Allergic rhinitis     Asthma     Scar of elbow     Pt states tennis elbow    Sleep apnea     Sleep difficulties    [2]   Past Surgical History:  Procedure Laterality Date    " ELBOW SURGERY Right 02/25/2025    adhesion removal   [3]   Family History  Problem Relation Name Age of Onset    Diabetes Mother      Hypertension Mother      Diabetes Father      Hypertension Father      Psychiatric Illness Father      Asthma Sister     [4]   Allergies  Allergen Reactions    Pollen Extract Cough, Eye Swelling, Fever, Headache, Nasal Congestion and Sneezing    Shellfish Allergy - Food Allergy Hives, Itching and Rash

## 2025-07-22 NOTE — PATIENT INSTRUCTIONS
Patient Instructions  Suspected viral gastritis    May use Immodium today and tomorrow if needed  Start with drinking clear liquids (i.e. Gatorade, Powerade, Pedialyte, etc but avoid red liquids).    You may advance to bland diet 6-8 hrs after last vomiting (ie. BRAT - bananas, rice, applesauce, toast) as tolerated.    Recommend avoiding milk products, spicy, greasy foods, and citrus products over the next 1-2 weeks. Advance diet as tolerated.    Follow up with your PCP in 1-2 days if symptoms persist as further testing may be warranted     Go to the ER if symptoms are worsening.     Follow up with PCP in 3-5 days.  Proceed to  ER if symptoms worsen.    If tests are performed, our office will contact you with results only if changes need to made to the care plan discussed with you at the visit. You can review your full results on [facility] My[facility].

## 2025-07-22 NOTE — LETTER
July 22, 2025     Patient: Valentin Reid   YOB: 1980   Date of Visit: 7/22/2025       To Whom It May Concern:    It is my medical opinion that Valentin Reid may return to work on 7/24/25.    If you have any questions or concerns, please don't hesitate to call.         Sincerely,        Karen Jang PA-C    CC: No Recipients

## 2025-07-25 ENCOUNTER — OFFICE VISIT (OUTPATIENT)
Dept: PHYSICAL THERAPY | Facility: CLINIC | Age: 45
End: 2025-07-25
Attending: ORTHOPAEDIC SURGERY
Payer: OTHER MISCELLANEOUS

## 2025-07-25 DIAGNOSIS — M77.11 LATERAL EPICONDYLITIS OF RIGHT ELBOW: Primary | ICD-10-CM

## 2025-07-25 PROCEDURE — 97110 THERAPEUTIC EXERCISES: CPT

## 2025-07-25 PROCEDURE — 97530 THERAPEUTIC ACTIVITIES: CPT

## 2025-07-25 PROCEDURE — 97112 NEUROMUSCULAR REEDUCATION: CPT

## 2025-07-25 NOTE — PROGRESS NOTES
"Daily Note     Today's date: 2025  Patient name: Valentin Reid  : 1980  MRN: [id number]  Referring provider: Kamron Pedro, *  Dx:   Encounter Diagnosis     ICD-10-CM    1. Lateral epicondylitis of right elbow  M77.11           Start Time: 0835  Stop Time: 0915  Total time in clinic (min): 40 minutes    Subjective: Reports having on belly pain and sickness over the past week but feeling better.  Feeling a little weak and requests to go easy today for PT session.      Objective: See treatment diary below      Assessment: Tolerated treatment well. Regresses overall session intensity per subjective; however able to load right elbow musculature.  As outlined below, engaged in various  (key pinch, lumbrical, spherical).  Patient demonstrated fatigue post treatment, exhibited good technique with therapeutic exercises, and would benefit from continued PT      Plan: Continue per plan of care.         POC Expires Auth Status Start Date Expiration Date PT Visit Limit           Date        Used 1       Remaining 46          Diagnosis: R ECRB repair   Precautions: n/a   Primary Goals: Wrist ext strength, flexibility, , \"sprint repetitions to fatigue without pain\"  Return to sport week 12 (eval at week 6)   *asterisks by exercise = given for HEP   Manuals    IASTM  5 min Lateral right dorsal forarm to insertion Defer, scab came off scar     Manual Resisted ecc                                There Ex         Strength*    Putty gripping x10 pinch     X10 spherical    Key pinch x10    Wrist FLX/EXT isotonics*        Ext eccentrics *     10 # 3x20   Sup - pro        RD UD*        Scap stab x20       Wrist Extnesion standing table press into extension S        Wrist Flexion (wrist ext) S        Bicep curl        TB wrist ext isometrics (horizontal)        Spherical Squeeze     YMB x30   Pinch    Cone 3# pinch  - let go and grabs 2x10    FF raise with pinch  " "2x10 3# Cone Cone 3# pinch  - let go and grabs 2x10    FF raise with pinch  2x10 3# Cone  Cone 3# abducion x20   Hook         Cylindrical         Finger Tip  Putty lumbrical  x20        Delt raises (Ant, 45, Lateral) ea        Delt raise        Towel  squeeze with tb resisted abduction        Neuro Re-Ed        Pronated Curl at heaven with long stick  4x10 15# 2x10 15#     Wrist isometrics  Elbow 90*        Isometric elbow extension        Tricep Pull down rope pull apart        Nerve Glides (ulnar, median, radial)        Flex bar Wrist flx/ext x12 ea  Eccentrics 2x10 blue flexbar Ulnar-radial deviation stab 45\" x 2     Sup-pronation stab 45\" x 2    Wrenching (R wrist ext bias) x15 W/ blue RD/UD x1 min ea   Hammer AROM sup-pro / rd-ud     W/ big stick x50ea   Isometrics  Pball Wall wrist extension -> roll up into wrist flexion and shoulde flexion      AROM        TrX rows        Standing tb scap strengthening series (rows, sapd, T's_ x10       Hi Rows        Patient education        Catch and toss Yed medball x20 X20 yellow  X20 pump fake X20 yellow  X20 pump fake     Body blade   30 sec x 2 90 flx     Stat[place]        Flexbar cross   Green flexbar x 10      Re-evaluation         Ther Act        Up/down fence painting stretch         Curl + OH Press        UBE  Lvl 7 2'/2' Lvl 7 2/2 Lvl 7 2/2 Lvl 8.8 2 min ea   DB - Tb UD        DB  Tb Supination        Bar- 4 Cone Drill (Pronation, UD,RD, Supination x10   4lb dowel wood  x10 ea W/ big stick x20ea    KB RD 10# sup/       KB Sup-pronation        Bar whirl arounds        Ball Let go and grabs   Yellow 2x10  YMB 3x10   BALL Smash Roll outs   Yellow x 20 cw/ccw  x20 blue ball cc/cw   Seated ER at 90   10# 2x10 w/ OH press  10# x10 w/ OH press   Modalities                                                                                 "

## 2025-07-28 ENCOUNTER — APPOINTMENT (OUTPATIENT)
Dept: PHYSICAL THERAPY | Facility: CLINIC | Age: 45
End: 2025-07-28
Attending: ORTHOPAEDIC SURGERY
Payer: OTHER MISCELLANEOUS

## 2025-07-29 ENCOUNTER — APPOINTMENT (OUTPATIENT)
Dept: PHYSICAL THERAPY | Facility: CLINIC | Age: 45
End: 2025-07-29
Attending: ORTHOPAEDIC SURGERY
Payer: OTHER MISCELLANEOUS

## 2025-08-08 ENCOUNTER — OFFICE VISIT (OUTPATIENT)
Dept: PHYSICAL THERAPY | Facility: CLINIC | Age: 45
End: 2025-08-08
Attending: ORTHOPAEDIC SURGERY
Payer: OTHER MISCELLANEOUS

## 2025-08-08 DIAGNOSIS — M77.11 LATERAL EPICONDYLITIS OF RIGHT ELBOW: Primary | ICD-10-CM

## 2025-08-08 PROCEDURE — 97140 MANUAL THERAPY 1/> REGIONS: CPT

## 2025-08-08 PROCEDURE — 97530 THERAPEUTIC ACTIVITIES: CPT

## 2025-08-15 ENCOUNTER — OFFICE VISIT (OUTPATIENT)
Dept: PHYSICAL THERAPY | Facility: CLINIC | Age: 45
End: 2025-08-15
Attending: ORTHOPAEDIC SURGERY
Payer: OTHER MISCELLANEOUS

## 2025-08-20 DIAGNOSIS — E66.813 CLASS 3 SEVERE OBESITY DUE TO EXCESS CALORIES WITH SERIOUS COMORBIDITY AND BODY MASS INDEX (BMI) OF 40.0 TO 44.9 IN ADULT: Primary | ICD-10-CM

## 2025-08-21 RX ORDER — TIRZEPATIDE 10 MG/.5ML
10 INJECTION, SOLUTION SUBCUTANEOUS WEEKLY
Qty: 2 ML | Refills: 0 | Status: SHIPPED | OUTPATIENT
Start: 2025-08-21

## 2025-08-22 ENCOUNTER — OFFICE VISIT (OUTPATIENT)
Dept: PHYSICAL THERAPY | Facility: CLINIC | Age: 45
End: 2025-08-22
Attending: ORTHOPAEDIC SURGERY
Payer: OTHER MISCELLANEOUS

## 2025-08-22 DIAGNOSIS — M77.11 LATERAL EPICONDYLITIS OF RIGHT ELBOW: Primary | ICD-10-CM

## 2025-08-22 PROCEDURE — 97110 THERAPEUTIC EXERCISES: CPT
